# Patient Record
Sex: FEMALE | Race: WHITE | Employment: OTHER | URBAN - METROPOLITAN AREA
[De-identification: names, ages, dates, MRNs, and addresses within clinical notes are randomized per-mention and may not be internally consistent; named-entity substitution may affect disease eponyms.]

---

## 2018-02-19 NOTE — LETTER
NICOLE Notifier: [employer]. Patient Name: Shi Dhillon Identification Number: [id number]                          Advance Beneficiary Notice of Noncoverage (ABN)   NOTE:  If Medicare doesn’t pay for D. item/service(s) below, you may have to pay.  Medicare does not pay for everything, even some care that you or your health care provider have good reason to think you need. We expect Medicare may not pay for the D. item/service(s) below.  D. Items or Services E. Reason Medicare May Not Pay: F. Estimated Cost   Left shoulder injection   Right shoulder injection with ultrasound guidance      __ Medicare does not cover this service      __ Medicare may not pay for this   item/service for your condition     __ Medicare may not pay for this item/service as often as this        WHAT YOU NEED TO DO NOW:  Read this notice, so you can make an informed decision about your care.  Ask us any questions that you may have after you finish reading.  Choose an option below about whether to receive the D. item/service(s) listed above.  Note: If you choose Option 1 or 2, we may help you to use any other insurance that you might have, but Medicare cannot require us to do this.  G. OPTIONS: Check only one box.  We cannot choose a box for you.   OPTION 1. I want the D. item/service(s) listed above. You may ask to be paid now, but I also want Medicare billed for an official decision on payment, which is sent to me on a Medicare Summary Notice (MSN). I understand that if Medicare doesn’t pay, I am responsible for payment, but I can appeal to Medicare by following the directions on the MSN. If Medicare does pay, you will refund any payments I made to you, less co-pays or deductibles.  OPTION 2. I want the D. item/service(s) listed above, but do not bill Medicare. You may ask to be paid now as I am responsible for payment. I cannot appeal if Medicare is not billed.  OPTION 3. I don't want the D. item/service(s) listed  above. I understand with this choice I am not responsible for payment, and I cannot appeal to see if Medicare would pay.    H. Additional Information:    This notice gives our opinion, not an official Medicare decision. If you have other questions on this notice or Medicare billing, call 1-800-MEDICARE ([phone]/TTY: [phone]). Signing below means that you have received and understand this notice. You also receive a copy.  I. Signature: J. Date:       You have the right to get Medicare information in an accessible format, like large print, Braille, or audio. You also have the right to file a complaint if you feel you’ve been discriminated against. Visit Medicare.gov/about- us/sbteuxuvbhqnj-qfkftjxazseyfdevl-lgbcwx.  According to the Paperwork Reduction Act of 1995, no persons are required to respond to a collection of information unless it displays a valid OMB control number. The valid OMB control number for this information collection is 0458-7429. The time required to complete this information collection is estimated to average 7 minutes per response, including the time to review instructions, search existing data resources, gather the data needed, and complete and review the information collection. If you have comments concerning the accuracy of the time estimate or suggestions for improving this form, please write to: CMS, [address], Attn: CHLOE Reports Clearance Officer, [place], [place] [address].  Form CMS-R-131 (Exp. 1/31/2026) Form Approved OMB No. [id number]         none

## 2020-07-18 ENCOUNTER — APPOINTMENT (OUTPATIENT)
Dept: GENERAL RADIOLOGY | Facility: HOSPITAL | Age: 85
End: 2020-07-18
Attending: EMERGENCY MEDICINE
Payer: MEDICARE

## 2020-07-18 ENCOUNTER — HOSPITAL ENCOUNTER (EMERGENCY)
Facility: HOSPITAL | Age: 85
Discharge: HOME | End: 2020-07-18
Attending: EMERGENCY MEDICINE
Payer: MEDICARE

## 2020-07-18 VITALS
DIASTOLIC BLOOD PRESSURE: 71 MMHG | BODY MASS INDEX: 23.74 KG/M2 | RESPIRATION RATE: 17 BRPM | HEIGHT: 62 IN | WEIGHT: 129 LBS | TEMPERATURE: 98 F | SYSTOLIC BLOOD PRESSURE: 176 MMHG | HEART RATE: 74 BPM | OXYGEN SATURATION: 97 %

## 2020-07-18 DIAGNOSIS — M54.59 INTRACTABLE LOW BACK PAIN: Primary | ICD-10-CM

## 2020-07-18 PROCEDURE — 72100 X-RAY EXAM L-S SPINE 2/3 VWS: CPT | Performed by: EMERGENCY MEDICINE

## 2020-07-18 PROCEDURE — 99283 EMERGENCY DEPT VISIT LOW MDM: CPT

## 2020-07-18 RX ORDER — HYDROCODONE BITARTRATE AND ACETAMINOPHEN 5; 325 MG/1; MG/1
1 TABLET ORAL ONCE
Status: COMPLETED | OUTPATIENT
Start: 2020-07-18 | End: 2020-07-18

## 2020-07-18 RX ORDER — HYDROCODONE BITARTRATE AND ACETAMINOPHEN 5; 325 MG/1; MG/1
1 TABLET ORAL EVERY 6 HOURS PRN
Qty: 5 TABLET | Refills: 0 | Status: SHIPPED | OUTPATIENT
Start: 2020-07-18 | End: 2020-07-22

## 2020-07-18 RX ORDER — METHYLPREDNISOLONE 4 MG/1
TABLET ORAL
Qty: 1 PACKAGE | Refills: 0 | Status: SHIPPED | OUTPATIENT
Start: 2020-07-18 | End: 2020-08-19 | Stop reason: ALTCHOICE

## 2020-07-18 NOTE — ED PROVIDER NOTES
Patient Seen in: [facility] AND [facility] Emergency Department      History   Patient presents with:  Back Pain    Stated Complaint: back pain     HPI    Patient is an 25-year-old female that is got a long history of back pain she describes having multiple inje Normal rate, regular rhythm, normal heart sounds and intact distal pulses. Pulmonary/Chest: Effort normal and breath sounds normal. No respiratory distress. Abdominal: Soft. Bowel sounds are normal. Exhibits no distension and no mass.  There is no tend Prescribed:  Current Discharge Medication List    START taking these medications    methylPREDNISolone (MEDROL) 4 MG Oral Tablet Therapy Pack  Dosepack: take as directed  Qty: 1 Package Refills: 0    HYDROcodone-acetaminophen 5-325 MG Oral Tab  Take 1 tabl

## 2020-07-22 ENCOUNTER — OFFICE VISIT (OUTPATIENT)
Dept: NEUROLOGY | Facility: CLINIC | Age: 85
End: 2020-07-22
Payer: MEDICARE

## 2020-07-22 VITALS
HEIGHT: 62 IN | SYSTOLIC BLOOD PRESSURE: 176 MMHG | WEIGHT: 129 LBS | DIASTOLIC BLOOD PRESSURE: 78 MMHG | BODY MASS INDEX: 23.74 KG/M2 | HEART RATE: 88 BPM | RESPIRATION RATE: 20 BRPM

## 2020-07-22 DIAGNOSIS — Z79.4 TYPE 2 DIABETES MELLITUS WITHOUT COMPLICATION, WITH LONG-TERM CURRENT USE OF INSULIN (HCC): ICD-10-CM

## 2020-07-22 DIAGNOSIS — E11.9 TYPE 2 DIABETES MELLITUS WITHOUT COMPLICATION, WITH LONG-TERM CURRENT USE OF INSULIN (HCC): ICD-10-CM

## 2020-07-22 DIAGNOSIS — E11.42 DIABETIC POLYNEUROPATHY ASSOCIATED WITH TYPE 2 DIABETES MELLITUS (HCC): ICD-10-CM

## 2020-07-22 DIAGNOSIS — K29.60 NSAID INDUCED GASTRITIS: ICD-10-CM

## 2020-07-22 DIAGNOSIS — T39.395A NSAID INDUCED GASTRITIS: ICD-10-CM

## 2020-07-22 DIAGNOSIS — Z85.3 HISTORY OF BREAST CANCER: ICD-10-CM

## 2020-07-22 DIAGNOSIS — M47.816 LUMBAR SPONDYLOSIS: Primary | ICD-10-CM

## 2020-07-22 DIAGNOSIS — R26.9 GAIT DISTURBANCE: ICD-10-CM
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
## 2020-07-22 PROBLEM — K92.0 GASTROINTESTINAL HEMORRHAGE WITH HEMATEMESIS: Status: ACTIVE | Noted: 2020-07-22

## 2020-07-22 PROCEDURE — 99205 OFFICE O/P NEW HI 60 MIN: CPT | Performed by: PHYSICAL MEDICINE & REHABILITATION

## 2020-07-22 RX ORDER — FUROSEMIDE 20 MG/1
20 TABLET ORAL DAILY
COMMUNITY

## 2020-07-22 RX ORDER — METOPROLOL TARTRATE 50 MG/1
50 TABLET, FILM COATED ORAL DAILY
COMMUNITY

## 2020-07-22 RX ORDER — GLIMEPIRIDE 4 MG/1
TABLET ORAL DAILY
COMMUNITY
Start: 2020-04-27

## 2020-07-22 RX ORDER — INSULIN DETEMIR 100 [IU]/ML
INJECTION, SOLUTION SUBCUTANEOUS
COMMUNITY
Start: 2020-05-26

## 2020-07-22 RX ORDER — ASPIRIN 81 MG/1
81 TABLET ORAL DAILY
COMMUNITY

## 2020-07-22 RX ORDER — ATORVASTATIN CALCIUM 10 MG/1
10 TABLET, FILM COATED ORAL DAILY
COMMUNITY
Start: 2020-04-27

## 2020-07-22 RX ORDER — GABAPENTIN 600 MG/1
TABLET ORAL 2 TIMES DAILY
COMMUNITY
Start: 2020-02-03

## 2020-07-22 RX ORDER — LISINOPRIL 20 MG/1
30 TABLET ORAL DAILY
COMMUNITY

## 2020-07-22 RX ORDER — AMLODIPINE BESYLATE 10 MG/1
10 TABLET ORAL DAILY
COMMUNITY

## 2020-07-22 RX ORDER — TRAMADOL HYDROCHLORIDE 50 MG/1
50 TABLET ORAL
COMMUNITY
End: 2020-07-22

## 2020-07-22 RX ORDER — FERROUS SULFATE 325(65) MG
325 TABLET ORAL
COMMUNITY
Start: 2019-10-10 | End: 2020-07-22

## 2020-07-22 RX ORDER — PANTOPRAZOLE SODIUM 40 MG/1
40 TABLET, DELAYED RELEASE ORAL
COMMUNITY
Start: 2019-09-02 | End: 2020-07-22

## 2020-07-22 RX ORDER — DIGOXIN 125 MCG
0.12 TABLET ORAL DAILY
COMMUNITY

## 2020-07-22 NOTE — PROGRESS NOTES
[address]  Progress Note    CHIEF COMPLAINT:  Patient presents with:  Low Back Pain: Patient present with low back  pain for 5+ years.  State aching pain that increase to stabbing when stands up, radiating d Take 30 mg by mouth daily. • LEVEMIR FLEXTOUCH 100 UNIT/ML Subcutaneous Solution Pen-injector INJECT 25 UNITS SC QD.     • glimepiride 4 MG Oral Tab Take by mouth daily. • gabapentin 600 MG Oral Tab Take by mouth 2 (two) times daily.      • furose lumbar plain film x-ray from the ED showing claw osteophytes and DDD throughout. Also multilevel degenerative spondylolisthesis throughout. ASSESSMENT AND PLAN:  1. Lumbar spondylosis  She is a complicated lady. Her main complaint is low back pain.

## 2020-07-27 ENCOUNTER — TELEPHONE (OUTPATIENT)
Dept: NEUROLOGY | Facility: CLINIC | Age: 85
End: 2020-07-27

## 2020-07-27 NOTE — TELEPHONE ENCOUNTER
On 7/22/20 Dr. Jannie Carvajal placed orders for  Bilateral L3-4, L4-5 and L5-S1 facet injections  Our office have not received insurance approval yet-routing to referral coordinators for determination

## 2020-07-28 NOTE — TELEPHONE ENCOUNTER
Referral from 07/22/20 was auto approved  Medicare Online for insurance coverage of Bilateral L3-4, L4-5 and L5-S1 facet injections cpt codes 25085-82, 05939-IS, 12395-PD, 84458-GF, 81073-NF. Insurance was verified and procedure is a covered benefit.  Au

## 2020-07-29 NOTE — TELEPHONE ENCOUNTER
Looks like injections were approved per last note, please call daughter Jesi Mcgill to schedule it.   Thanks

## 2020-07-29 NOTE — TELEPHONE ENCOUNTER
Spoke to patient's daughter and scheduled her for Bilateral L34, L45 and L5-S1 facet injections on 7/30/20. Medications and allergies reviewed. She will have a ride for procedure.

## 2020-07-30 ENCOUNTER — OFFICE VISIT (OUTPATIENT)
Dept: SURGERY | Facility: CLINIC | Age: 85
End: 2020-07-30

## 2020-07-30 DIAGNOSIS — M47.816 LUMBAR SPONDYLOSIS: Primary | ICD-10-CM

## 2020-07-30 PROCEDURE — 64494 INJ PARAVERT F JNT L/S 2 LEV: CPT | Performed by: PHYSICAL MEDICINE & REHABILITATION

## 2020-07-30 PROCEDURE — 64493 INJ PARAVERT F JNT L/S 1 LEV: CPT | Performed by: PHYSICAL MEDICINE & REHABILITATION

## 2020-07-30 PROCEDURE — 64495 INJ PARAVERT F JNT L/S 3 LEV: CPT | Performed by: PHYSICAL MEDICINE & REHABILITATION

## 2020-08-19 ENCOUNTER — OFFICE VISIT (OUTPATIENT)
Dept: NEUROLOGY | Facility: CLINIC | Age: 85
End: 2020-08-19
Payer: MEDICARE

## 2020-08-19 VITALS — WEIGHT: 121 LBS | BODY MASS INDEX: 22.26 KG/M2 | HEIGHT: 62 IN

## 2020-08-19 DIAGNOSIS — T39.395A NSAID INDUCED GASTRITIS: ICD-10-CM

## 2020-08-19 DIAGNOSIS — R26.9 GAIT DISTURBANCE: ICD-10-CM

## 2020-08-19 DIAGNOSIS — E11.9 TYPE 2 DIABETES MELLITUS WITHOUT COMPLICATION, WITH LONG-TERM CURRENT USE OF INSULIN (HCC): ICD-10-CM

## 2020-08-19 DIAGNOSIS — E11.42 DIABETIC POLYNEUROPATHY ASSOCIATED WITH TYPE 2 DIABETES MELLITUS (HCC): ICD-10-CM

## 2020-08-19 DIAGNOSIS — M48.062 SPINAL STENOSIS OF LUMBAR REGION WITH NEUROGENIC CLAUDICATION: Primary | ICD-10-CM

## 2020-08-19 DIAGNOSIS — K29.60 NSAID INDUCED GASTRITIS: ICD-10-CM

## 2020-08-19 DIAGNOSIS — Z79.4 TYPE 2 DIABETES MELLITUS WITHOUT COMPLICATION, WITH LONG-TERM CURRENT USE OF INSULIN (HCC): ICD-10-CM

## 2020-08-19 PROBLEM — G60.3 IDIOPATHIC PROGRESSIVE NEUROPATHY: Status: ACTIVE | Noted: 2020-08-19

## 2020-08-19 PROCEDURE — 99214 OFFICE O/P EST MOD 30 MIN: CPT | Performed by: PHYSICAL MEDICINE & REHABILITATION

## 2020-08-19 RX ORDER — PREGABALIN 75 MG/1
75 CAPSULE ORAL 2 TIMES DAILY
COMMUNITY
End: 2020-08-19 | Stop reason: ALTCHOICE

## 2020-08-19 RX ORDER — DULOXETIN HYDROCHLORIDE 30 MG/1
30 CAPSULE, DELAYED RELEASE ORAL DAILY
Qty: 30 CAPSULE | Refills: 0 | Status: SHIPPED | OUTPATIENT
Start: 2020-08-19

## 2020-08-19 NOTE — PROGRESS NOTES
[address]  Progress Note    CHIEF COMPLAINT:  Patient presents with:  Low Back Pain: Patient presents for post injectin follow up.  She reports that injection did not help c/o pain in lower back, groin legs (CYMBALTA) 30 MG Oral Cap DR Particles Take 1 capsule (30 mg total) by mouth daily. 30 capsule 0   • Metoprolol Tartrate 50 MG Oral Tab Take 50 mg by mouth daily. • lisinopril 20 MG Oral Tab Take 30 mg by mouth daily.        • LEVEMIR FLEXTOUCH 100 UNIT extremities are diffusely weak, very poor balance and wide-based gait  Sensation: Decreased in a stocking distribution  Reflexes: Areflexic  SLR: neg        Data    Radiology Imagin.  I personally reviewed an old lumbar MRI showing multilevel stenosis plan.  All questions were answered. There were no barriers to learning.         June Cabrera MD  Physical Medicine and Rehabilitation/Sports Medicine  MEDICAL CENTER [facility]

## 2020-08-24 ENCOUNTER — TELEPHONE (OUTPATIENT)
Dept: NEUROLOGY | Facility: CLINIC | Age: 85
End: 2020-08-24

## 2020-08-24 NOTE — TELEPHONE ENCOUNTER
Contacted patient who requested order for Wheelchair be faxed to [facility] in [place].     Order faxed to [phone]    Patient thankful without further questions

## 2020-09-23 ENCOUNTER — OFFICE VISIT (OUTPATIENT)
Dept: NEUROLOGY | Facility: CLINIC | Age: 85
End: 2020-09-23
Payer: MEDICARE

## 2020-09-23 VITALS — WEIGHT: 121 LBS | BODY MASS INDEX: 22.26 KG/M2 | HEIGHT: 62 IN

## 2020-09-23 DIAGNOSIS — M48.062 SPINAL STENOSIS OF LUMBAR REGION WITH NEUROGENIC CLAUDICATION: Primary | ICD-10-CM

## 2020-09-23 DIAGNOSIS — T39.395A NSAID INDUCED GASTRITIS: ICD-10-CM

## 2020-09-23 DIAGNOSIS — K29.60 NSAID INDUCED GASTRITIS: ICD-10-CM

## 2020-09-23 DIAGNOSIS — E11.42 DIABETIC POLYNEUROPATHY ASSOCIATED WITH TYPE 2 DIABETES MELLITUS (HCC): ICD-10-CM

## 2020-09-23 DIAGNOSIS — R26.9 GAIT DISTURBANCE: ICD-10-CM
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
## 2020-09-23 PROCEDURE — 99213 OFFICE O/P EST LOW 20 MIN: CPT | Performed by: PHYSICAL MEDICINE & REHABILITATION

## 2020-09-23 RX ORDER — DICYCLOMINE HYDROCHLORIDE 10 MG/1
10 CAPSULE ORAL
COMMUNITY

## 2020-09-23 RX ORDER — PANTOPRAZOLE SODIUM 40 MG/1
40 TABLET, DELAYED RELEASE ORAL
COMMUNITY

## 2020-09-23 RX ORDER — POLYETHYLENE GLYCOL 3350 17 G/17G
17 POWDER, FOR SOLUTION ORAL DAILY
COMMUNITY

## 2020-09-23 RX ORDER — TRAMADOL HYDROCHLORIDE 50 MG/1
50 TABLET ORAL EVERY 6 HOURS PRN
COMMUNITY

## 2020-09-23 RX ORDER — SENNA PLUS 8.6 MG/1
1 TABLET ORAL DAILY
COMMUNITY

## 2020-09-23 RX ORDER — ONDANSETRON 4 MG/1
4 TABLET, FILM COATED ORAL EVERY 8 HOURS PRN
COMMUNITY

## 2020-09-23 RX ORDER — CARVEDILOL 12.5 MG/1
12.5 TABLET ORAL 2 TIMES DAILY WITH MEALS
COMMUNITY

## 2020-09-23 NOTE — PROGRESS NOTES
[address]  Progress Note    CHIEF COMPLAINT:  Patient presents with:  Low Back Pain: Patient presents for post MRI follow up.  Patient states that on 09/01/2020 she slipped and fell in bath tub, went to [facility] Oral Tab Take 12.5 mg by mouth 2 (two) times daily with meals. • Dicyclomine HCl 10 MG Oral Cap Take 10 mg by mouth 4 (four) times daily before meals and nightly.      • Ondansetron HCl (ZOFRAN) 4 mg tablet Take 4 mg by mouth every 8 (eight) hours as ne All other systems reviewed and are negative. Pertinent positives and negatives noted in the HPI. PHYSICAL EXAM:   Ht 62\"   Wt 121 lb (54.9 kg)   BMI 22.13 kg/m²     Body mass index is 22.13 kg/m².       General: No immediate distress  Eye wellbeing. 3. Fall, initial encounter  Work-up proceeding as above    4. Diabetic polyneuropathy associated with type 2 diabetes mellitus (HCC)  Severe, she does use a walker.     5. NSAID induced gastritis  NSAIDs relatively contraindicated due to histo

## 2020-09-28 ENCOUNTER — TELEPHONE (OUTPATIENT)
Dept: NEUROLOGY | Facility: CLINIC | Age: 85
End: 2020-09-28

## 2021-02-11 DIAGNOSIS — Z23 NEED FOR VACCINATION: ICD-10-CM

## 2022-11-02 ENCOUNTER — TELEPHONE (OUTPATIENT)
Dept: NEUROLOGY | Facility: CLINIC | Age: 87
End: 2022-11-02

## 2022-11-02 ENCOUNTER — OFFICE VISIT (OUTPATIENT)
Dept: PHYSICAL MEDICINE AND REHAB | Facility: CLINIC | Age: 87
End: 2022-11-02
Payer: MEDICARE

## 2022-11-02 ENCOUNTER — HOSPITAL ENCOUNTER (OUTPATIENT)
Dept: GENERAL RADIOLOGY | Facility: HOSPITAL | Age: 87
Discharge: HOME | End: 2022-11-02
Attending: PHYSICAL MEDICINE & REHABILITATION
Payer: MEDICARE

## 2022-11-02 VITALS — WEIGHT: 122 LBS | HEART RATE: 77 BPM | OXYGEN SATURATION: 99 % | HEIGHT: 60 IN | BODY MASS INDEX: 23.95 KG/M2

## 2022-11-02 DIAGNOSIS — M25.511 CHRONIC PAIN OF BOTH SHOULDERS: ICD-10-CM

## 2022-11-02 DIAGNOSIS — K92.0 GASTROINTESTINAL HEMORRHAGE WITH HEMATEMESIS: ICD-10-CM

## 2022-11-02 DIAGNOSIS — M25.411 EFFUSION OF RIGHT SHOULDER JOINT: ICD-10-CM

## 2022-11-02 DIAGNOSIS — M25.512 CHRONIC PAIN OF BOTH SHOULDERS: ICD-10-CM

## 2022-11-02 DIAGNOSIS — E11.9 TYPE 2 DIABETES MELLITUS WITHOUT COMPLICATION, WITH LONG-TERM CURRENT USE OF INSULIN (HCC): ICD-10-CM

## 2022-11-02 DIAGNOSIS — G89.29 CHRONIC PAIN OF BOTH SHOULDERS: ICD-10-CM

## 2022-11-02 DIAGNOSIS — Z79.4 TYPE 2 DIABETES MELLITUS WITHOUT COMPLICATION, WITH LONG-TERM CURRENT USE OF INSULIN (HCC): ICD-10-CM

## 2022-11-02 DIAGNOSIS — M25.411 EFFUSION OF RIGHT SHOULDER JOINT: Primary | ICD-10-CM

## 2022-11-02 PROCEDURE — 73030 X-RAY EXAM OF SHOULDER: CPT | Performed by: PHYSICAL MEDICINE & REHABILITATION

## 2022-11-02 NOTE — TELEPHONE ENCOUNTER
Right shoulder injection/ aspiration CPT CODE: 87473,      Diana Huerta for authorization for above. Spoke to Erik MANE who states no authorization is required. Reference call# [id number].     Notified RICARDO Approved Referral for scheduling

## 2022-11-03 ENCOUNTER — MED REC SCAN ONLY (OUTPATIENT)
Dept: PHYSICAL MEDICINE AND REHAB | Facility: CLINIC | Age: 87
End: 2022-11-03

## 2022-11-03 RX ORDER — TRIAMCINOLONE ACETONIDE 40 MG/ML
80 INJECTION, SUSPENSION INTRA-ARTICULAR; INTRAMUSCULAR ONCE
Status: COMPLETED | OUTPATIENT
Start: 2022-11-03 | End: 2022-11-03

## 2022-11-03 RX ORDER — LIDOCAINE HYDROCHLORIDE 10 MG/ML
4 INJECTION, SOLUTION INFILTRATION; PERINEURAL ONCE
Status: COMPLETED | OUTPATIENT
Start: 2022-11-03 | End: 2022-11-03

## 2022-11-16 ENCOUNTER — OFFICE VISIT (OUTPATIENT)
Dept: PHYSICAL MEDICINE AND REHAB | Facility: CLINIC | Age: 87
End: 2022-11-16
Payer: MEDICARE

## 2022-11-16 VITALS — HEART RATE: 80 BPM | BODY MASS INDEX: 23.95 KG/M2 | OXYGEN SATURATION: 100 % | HEIGHT: 60 IN | WEIGHT: 122 LBS

## 2022-11-16 DIAGNOSIS — M25.512 CHRONIC PAIN OF BOTH SHOULDERS: Primary | ICD-10-CM

## 2022-11-16 DIAGNOSIS — M25.511 CHRONIC PAIN OF BOTH SHOULDERS: Primary | ICD-10-CM

## 2022-11-16 DIAGNOSIS — G89.29 CHRONIC PAIN OF BOTH SHOULDERS: Primary | ICD-10-CM

## 2022-11-16 DIAGNOSIS — M25.411 EFFUSION OF RIGHT SHOULDER JOINT: ICD-10-CM

## 2022-11-16 RX ORDER — DULOXETIN HYDROCHLORIDE 20 MG/1
20 CAPSULE, DELAYED RELEASE ORAL DAILY
Qty: 30 CAPSULE | Refills: 0 | Status: SHIPPED | OUTPATIENT
Start: 2022-11-16 | End: 2022-12-16

## 2022-11-27 PROBLEM — M25.511 CHRONIC PAIN OF BOTH SHOULDERS: Status: ACTIVE | Noted: 2022-11-27

## 2022-11-27 PROBLEM — M25.512 CHRONIC PAIN OF BOTH SHOULDERS: Status: ACTIVE | Noted: 2022-11-27

## 2022-11-27 PROBLEM — M25.411: Status: ACTIVE | Noted: 2022-11-27

## 2022-11-27 PROBLEM — G89.29 CHRONIC PAIN OF BOTH SHOULDERS: Status: ACTIVE | Noted: 2022-11-27

## 2022-11-28 NOTE — PROCEDURES
Shoulder injection  Location: right  After discussing benefits and possible side effects, we proceeded with a subacromial bursa injection. The patient was consented. The patient was seated, and the posterolateral shoulder was palpated. The skin was sterilely prepped. An 18-gauge needle was introduced into the subacromial space. 5 cc of dark red effusion was withdrawn. A total of 2 cc of 40 mg/ml Kenalog and 4 cc of 1% lidocaine was injected. The patient tolerated the procedure well without adverse effects.

## 2022-11-29 ENCOUNTER — MED REC SCAN ONLY (OUTPATIENT)
Dept: PHYSICAL MEDICINE AND REHAB | Facility: CLINIC | Age: 87
End: 2022-11-29

## 2022-12-21 ENCOUNTER — OFFICE VISIT (OUTPATIENT)
Dept: PHYSICAL MEDICINE AND REHAB | Facility: CLINIC | Age: 87
End: 2022-12-21
Payer: MEDICARE

## 2022-12-21 VITALS — HEIGHT: 60 IN | BODY MASS INDEX: 24.15 KG/M2 | OXYGEN SATURATION: 100 % | HEART RATE: 85 BPM | WEIGHT: 123 LBS

## 2022-12-21 DIAGNOSIS — M25.511 CHRONIC PAIN OF BOTH SHOULDERS: Primary | ICD-10-CM

## 2022-12-21 DIAGNOSIS — E11.9 TYPE 2 DIABETES MELLITUS WITHOUT COMPLICATION, WITH LONG-TERM CURRENT USE OF INSULIN (HCC): ICD-10-CM

## 2022-12-21 DIAGNOSIS — G89.29 CHRONIC PAIN OF BOTH SHOULDERS: Primary | ICD-10-CM

## 2022-12-21 DIAGNOSIS — Z79.4 TYPE 2 DIABETES MELLITUS WITHOUT COMPLICATION, WITH LONG-TERM CURRENT USE OF INSULIN (HCC): ICD-10-CM

## 2022-12-21 DIAGNOSIS — K92.0 GASTROINTESTINAL HEMORRHAGE WITH HEMATEMESIS: ICD-10-CM

## 2022-12-21 DIAGNOSIS — M25.512 CHRONIC PAIN OF BOTH SHOULDERS: Primary | ICD-10-CM

## 2022-12-21 PROCEDURE — 3008F BODY MASS INDEX DOCD: CPT | Performed by: PHYSICAL MEDICINE & REHABILITATION

## 2022-12-21 PROCEDURE — 99213 OFFICE O/P EST LOW 20 MIN: CPT | Performed by: PHYSICAL MEDICINE & REHABILITATION

## 2022-12-21 PROCEDURE — 1125F AMNT PAIN NOTED PAIN PRSNT: CPT | Performed by: PHYSICAL MEDICINE & REHABILITATION

## 2023-01-16 RX ORDER — DULOXETIN HYDROCHLORIDE 20 MG/1
CAPSULE, DELAYED RELEASE ORAL
Qty: 30 CAPSULE | Refills: 0 | Status: SHIPPED | OUTPATIENT
Start: 2023-01-16

## 2023-01-16 NOTE — TELEPHONE ENCOUNTER
Medication request: Duloxetine 20 mg oral Cap. Take 1 capsule by mouth daily. #30. No refills. LOV: 12/21/2022  NOV: None. Last office note: I educated her that the Cymbalta must be taken regularly for us to determine whether it is working or not. She will return if symptoms worsen. [place] 11/16/2022 per pharmacy.

## 2023-02-13 NOTE — TELEPHONE ENCOUNTER
Refill Request    Medication request: DULOXETINE 20 MG Oral Cap DR Particles. TAKE 1 CAPSULE BY MOUTH EVERY DAY    LOV:12/21/2022 Abiel Harman MD   Due back to clinic per last office note: Per Dr. Oseas Ramos: Alberto Sterling will return if symptoms worsen. \"  NOV: 2/14/2023 Abiel Harman MD      [place]: 01/21/2023 #30  - s/w pharmacy staff to verbally confirm last refill date. Urine drug screen (if applicable): n/a  Pain contract: n/a    LOV plan (if weaning or changing medications): Per Dr. Oseas Ramos: \"We discussed options and opted for watchful waiting. I educated her that the Cymbalta must be taken regularly for us to determine whether it is working or not. \"

## 2023-02-14 ENCOUNTER — OFFICE VISIT (OUTPATIENT)
Dept: PHYSICAL MEDICINE AND REHAB | Facility: CLINIC | Age: 88
End: 2023-02-14
Payer: MEDICARE

## 2023-02-14 VITALS — BODY MASS INDEX: 24.15 KG/M2 | WEIGHT: 123 LBS | HEIGHT: 60 IN

## 2023-02-14 DIAGNOSIS — Z79.4 TYPE 2 DIABETES MELLITUS WITHOUT COMPLICATION, WITH LONG-TERM CURRENT USE OF INSULIN (HCC): ICD-10-CM

## 2023-02-14 DIAGNOSIS — K92.0 GASTROINTESTINAL HEMORRHAGE WITH HEMATEMESIS: ICD-10-CM

## 2023-02-14 DIAGNOSIS — M25.512 CHRONIC PAIN OF BOTH SHOULDERS: Primary | ICD-10-CM

## 2023-02-14 DIAGNOSIS — E11.9 TYPE 2 DIABETES MELLITUS WITHOUT COMPLICATION, WITH LONG-TERM CURRENT USE OF INSULIN (HCC): ICD-10-CM

## 2023-02-14 DIAGNOSIS — M25.511 CHRONIC PAIN OF BOTH SHOULDERS: Primary | ICD-10-CM

## 2023-02-14 DIAGNOSIS — G89.29 CHRONIC PAIN OF BOTH SHOULDERS: Primary | ICD-10-CM

## 2023-02-14 PROCEDURE — 1125F AMNT PAIN NOTED PAIN PRSNT: CPT | Performed by: PHYSICAL MEDICINE & REHABILITATION

## 2023-02-14 PROCEDURE — 3008F BODY MASS INDEX DOCD: CPT | Performed by: PHYSICAL MEDICINE & REHABILITATION

## 2023-02-14 PROCEDURE — 99214 OFFICE O/P EST MOD 30 MIN: CPT | Performed by: PHYSICAL MEDICINE & REHABILITATION

## 2023-02-14 RX ORDER — DULOXETIN HYDROCHLORIDE 20 MG/1
40 CAPSULE, DELAYED RELEASE ORAL DAILY
Qty: 60 CAPSULE | Refills: 0 | Status: SHIPPED | OUTPATIENT
Start: 2023-02-14 | End: 2023-03-16

## 2023-03-13 NOTE — TELEPHONE ENCOUNTER
Refill Request    Medication request: DULoxetine 20 MG Oral Cap DR Particles Take 2 capsules (40 mg total) by mouth daily    LOV:2/14/2023 Martin Bullard MD   Due back to clinic per last office note:  \"She will check back with me in 1 month\"  NOV: 3/22/2023 Martin Bullard MD      [place]: 2/14/2023 #60    Urine drug screen (if applicable): None  Pain contract: None    LOV plan (if weaning or changing medications): Per Dr. Erika Chawla note: \" She seems to be responding to Cymbalta 20 mg without side effects. I recommended increasing to 40 mg a day. \"    SW patient's daughter, who reported patient is doing well on the increased dose of duloxetine. Patient is taking 2 capsules daily.

## 2023-03-14 RX ORDER — DULOXETIN HYDROCHLORIDE 20 MG/1
CAPSULE, DELAYED RELEASE ORAL
Qty: 60 CAPSULE | Refills: 0 | Status: SHIPPED | OUTPATIENT
Start: 2023-03-14

## 2023-03-22 ENCOUNTER — TELEPHONE (OUTPATIENT)
Dept: PHYSICAL MEDICINE AND REHAB | Facility: CLINIC | Age: 88
End: 2023-03-22

## 2023-03-22 ENCOUNTER — OFFICE VISIT (OUTPATIENT)
Dept: PHYSICAL MEDICINE AND REHAB | Facility: CLINIC | Age: 88
End: 2023-03-22
Payer: MEDICARE

## 2023-03-22 VITALS — WEIGHT: 125 LBS | HEIGHT: 60 IN | OXYGEN SATURATION: 97 % | BODY MASS INDEX: 24.54 KG/M2 | HEART RATE: 85 BPM

## 2023-03-22 DIAGNOSIS — E11.42 DIABETIC POLYNEUROPATHY ASSOCIATED WITH TYPE 2 DIABETES MELLITUS (HCC): ICD-10-CM

## 2023-03-22 DIAGNOSIS — R26.9 GAIT DISTURBANCE: ICD-10-CM

## 2023-03-22 DIAGNOSIS — M19.012 PRIMARY OSTEOARTHRITIS OF SHOULDERS, BILATERAL: ICD-10-CM

## 2023-03-22 DIAGNOSIS — M48.062 SPINAL STENOSIS OF LUMBAR REGION WITH NEUROGENIC CLAUDICATION: Primary | ICD-10-CM

## 2023-03-22 DIAGNOSIS — M19.011 PRIMARY OSTEOARTHRITIS OF SHOULDERS, BILATERAL: ICD-10-CM

## 2023-03-22 PROCEDURE — 3008F BODY MASS INDEX DOCD: CPT | Performed by: PHYSICAL MEDICINE & REHABILITATION

## 2023-03-22 PROCEDURE — 1125F AMNT PAIN NOTED PAIN PRSNT: CPT | Performed by: PHYSICAL MEDICINE & REHABILITATION

## 2023-03-22 PROCEDURE — 99214 OFFICE O/P EST MOD 30 MIN: CPT | Performed by: PHYSICAL MEDICINE & REHABILITATION

## 2023-03-22 RX ORDER — DULOXETIN HYDROCHLORIDE 60 MG/1
60 CAPSULE, DELAYED RELEASE ORAL DAILY
Qty: 30 CAPSULE | Refills: 0 | Status: SHIPPED | OUTPATIENT
Start: 2023-03-22 | End: 2023-04-21

## 2023-03-22 NOTE — TELEPHONE ENCOUNTER
Initiated authorization for Bilateral L3 TFESIs CPT [id number] dx:M48.062 to be done at [address] with [employer]  Status: Approved valid 3/22/23-6/20/23  Authorization #[id number] [facility] Tracking #[id number]

## 2023-03-24 PROBLEM — M19.012 PRIMARY OSTEOARTHRITIS OF SHOULDERS, BILATERAL: Status: ACTIVE | Noted: 2023-03-24

## 2023-03-24 PROBLEM — E11.9 TYPE 2 DIABETES MELLITUS WITHOUT COMPLICATION, WITH LONG-TERM CURRENT USE OF INSULIN (HCC): Status: ACTIVE | Noted: 2020-07-22

## 2023-03-24 PROBLEM — Z79.4 TYPE 2 DIABETES MELLITUS WITHOUT COMPLICATION, WITH LONG-TERM CURRENT USE OF INSULIN (HCC): Status: ACTIVE | Noted: 2020-07-22

## 2023-03-24 PROBLEM — M19.011 PRIMARY OSTEOARTHRITIS OF SHOULDERS, BILATERAL: Status: ACTIVE | Noted: 2023-03-24

## 2023-04-06 ENCOUNTER — OFFICE VISIT (OUTPATIENT)
Dept: SURGERY | Facility: CLINIC | Age: 88
End: 2023-04-06
Payer: MEDICARE

## 2023-04-06 DIAGNOSIS — M48.062 SPINAL STENOSIS OF LUMBAR REGION WITH NEUROGENIC CLAUDICATION: Primary | ICD-10-CM

## 2023-04-06 PROCEDURE — 64483 NJX AA&/STRD TFRM EPI L/S 1: CPT | Performed by: PHYSICAL MEDICINE & REHABILITATION

## 2023-04-06 NOTE — PROCEDURES
Preoperative Diagnosis:  (Z54.580) Spinal stenosis of lumbar region with neurogenic claudication  (primary encounter diagnosis)       Postoperative Diagnosis:  (F89.566) Spinal stenosis of lumbar region with neurogenic claudication  (primary encounter diagnosis)       Procedures: Bilateral L3 Transforaminal epidural steroid injection under fluoroscopic guidance and contrast enhancement. Surgeon:  Kimberli Jackson M.D. Anesthesia:  Local      OPERATIVE PROCEDURE:  The patient was consented. She was brought into the operating suite and placed on the fluoroscopy table in the prone position. She was sterilely prepped and draped in routine fashion. The right L3 foramen was identified. The overlying skin was anesthetized. A 22-gauge spinal needle was introduced and directed towards the foramen. Needle position was verified using fluoroscopy and radiographic contrast showing an epidurogram.  There was no withdrawal of blood or CSF from the needle. Radiographic interpretation was that the needle was in proper position for a transforaminal epidural steroid injection. I placed as mixture of 2mL of 6mg/mL Celestone and 2mL of 1% preservative-free lidocaine into the epidural space. The same procedure was repeated on the contralateral side. The patient tolerated the procedure well without any immediate complications. She was given discharge instructions and is to follow up with me in approximately two weeks.

## 2023-04-14 RX ORDER — DULOXETIN HYDROCHLORIDE 20 MG/1
CAPSULE, DELAYED RELEASE ORAL
Qty: 60 CAPSULE | Refills: 0 | OUTPATIENT
Start: 2023-04-14

## 2023-04-17 NOTE — TELEPHONE ENCOUNTER
Refill Request    Medication request: Duloxetine 60mg oral cap    LOV: 4/6/23 EOSC Kristen Harrell MD   RTC: F/u post injection  NOV: 4/25/2023 Kristen Harrell MD      [place]: 3/22/23 #48    UDS: (if applicable): None  Pain contract: N/A    LOV plan (if weaning or changing medications): We will set aside management for the time. I increased her to maximal dose Cymbalta 60 mg.

## 2023-04-18 RX ORDER — DULOXETIN HYDROCHLORIDE 60 MG/1
CAPSULE, DELAYED RELEASE ORAL
Qty: 30 CAPSULE | Refills: 0 | Status: SHIPPED | OUTPATIENT
Start: 2023-04-18

## 2023-04-25 ENCOUNTER — OFFICE VISIT (OUTPATIENT)
Dept: PHYSICAL MEDICINE AND REHAB | Facility: CLINIC | Age: 88
End: 2023-04-25
Payer: MEDICARE

## 2023-04-25 ENCOUNTER — TELEPHONE (OUTPATIENT)
Dept: PHYSICAL MEDICINE AND REHAB | Facility: CLINIC | Age: 88
End: 2023-04-25

## 2023-04-25 VITALS — DIASTOLIC BLOOD PRESSURE: 68 MMHG | SYSTOLIC BLOOD PRESSURE: 124 MMHG

## 2023-04-25 DIAGNOSIS — K92.0 GASTROINTESTINAL HEMORRHAGE WITH HEMATEMESIS: ICD-10-CM

## 2023-04-25 DIAGNOSIS — M25.411 EFFUSION OF JOINT OF RIGHT SHOULDER: Primary | ICD-10-CM

## 2023-04-25 DIAGNOSIS — G89.4 CHRONIC PAIN SYNDROME: ICD-10-CM

## 2023-04-25 DIAGNOSIS — M48.062 SPINAL STENOSIS OF LUMBAR REGION WITH NEUROGENIC CLAUDICATION: ICD-10-CM

## 2023-04-25 DIAGNOSIS — E11.42 DIABETIC POLYNEUROPATHY ASSOCIATED WITH TYPE 2 DIABETES MELLITUS (HCC): ICD-10-CM

## 2023-04-25 PROCEDURE — 3074F SYST BP LT 130 MM HG: CPT | Performed by: PHYSICAL MEDICINE & REHABILITATION

## 2023-04-25 PROCEDURE — 99214 OFFICE O/P EST MOD 30 MIN: CPT | Performed by: PHYSICAL MEDICINE & REHABILITATION

## 2023-04-25 PROCEDURE — 1126F AMNT PAIN NOTED NONE PRSNT: CPT | Performed by: PHYSICAL MEDICINE & REHABILITATION

## 2023-04-25 PROCEDURE — 3078F DIAST BP <80 MM HG: CPT | Performed by: PHYSICAL MEDICINE & REHABILITATION

## 2023-04-25 RX ORDER — BUPRENORPHINE 5 UG/H
1 PATCH TRANSDERMAL WEEKLY
Qty: 4 PATCH | Refills: 0 | OUTPATIENT
Start: 2023-04-25 | End: 2023-05-02

## 2023-04-25 NOTE — TELEPHONE ENCOUNTER
Initiated authorization for Right shoulder injection and aspiration with ultrasound guidance CPT 63484,  with [employer]  Status: Approved-authorization is not required per health plan        Patient was scheduled 5/17/23 however has another appt, so requested to cx.-appt canceled.   Re-scheduled to  on 5/24/23 at patient request since son is off and will provide transportation

## 2023-05-16 RX ORDER — DULOXETIN HYDROCHLORIDE 60 MG/1
60 CAPSULE, DELAYED RELEASE ORAL DAILY
Qty: 90 CAPSULE | Refills: 3 | Status: SHIPPED | OUTPATIENT
Start: 2023-05-16 | End: 2024-05-10

## 2023-05-16 NOTE — TELEPHONE ENCOUNTER
Refill Request    Medication request: DULOXETINE 60 MG Oral Cap DR Particles  TAKE 1 CAPSULE BY MOUTH EVERY DAY    LOV:2023 Audrey Bauer MD   Due back to clinic per last office note:  \"RTC 3 weeks\"  NOV: 2023 Audrey Bauer MD      [place]: 2023 #30    Urine drug screen (if applicable): None  Pain contract: None    LOV plan (if weaning or changing medications): Per Dr. Chacon  notes: \" recommend Butrans. I started her on 5 mcg/h patches she will take that for 2 weeks and then use 2 patches for the third week. \"  (No mention of Duloxetine in LOV plan)  \"She is on cymbalta\"

## 2023-05-30 ENCOUNTER — OFFICE VISIT (OUTPATIENT)
Dept: PHYSICAL MEDICINE AND REHAB | Facility: CLINIC | Age: 88
End: 2023-05-30
Payer: MEDICARE

## 2023-05-30 DIAGNOSIS — M25.411 EFFUSION OF JOINT OF RIGHT SHOULDER: Primary | ICD-10-CM

## 2023-05-30 LAB
BASOPHILS NFR SNV: 0 %
COLOR FLD: YELLOW
EOSINOPHIL NFR SNV: 0 %
LYMPHOCYTES NFR SNV: 20 %
MONOS+MACROS NFR SNV: 34 %
NEUTROPHILS NFR FLD: 46 %
RBC # FLD AUTO: 1333 /CUMM (ref ?–1)
TOTAL CELLS COUNTED FLD: 100
TOTAL CELLS COUNTED SNV: 262 /CUMM (ref 0–200)
WBC # SNV: 262 /CUMM

## 2023-05-30 PROCEDURE — 89051 BODY FLUID CELL COUNT: CPT | Performed by: PHYSICAL MEDICINE & REHABILITATION

## 2023-05-30 PROCEDURE — 89050 BODY FLUID CELL COUNT: CPT | Performed by: PHYSICAL MEDICINE & REHABILITATION

## 2023-05-30 PROCEDURE — 87070 CULTURE OTHR SPECIMN AEROBIC: CPT | Performed by: PHYSICAL MEDICINE & REHABILITATION

## 2023-05-30 PROCEDURE — 87205 SMEAR GRAM STAIN: CPT | Performed by: PHYSICAL MEDICINE & REHABILITATION

## 2023-05-30 PROCEDURE — 89060 EXAM SYNOVIAL FLUID CRYSTALS: CPT | Performed by: PHYSICAL MEDICINE & REHABILITATION

## 2023-05-30 NOTE — PROCEDURES
Shoulder injection with ultrasound guidance  Location: right  After discussing benefits and possible side effects, we proceeded with a an ultrasound-guided intra-articular shoulder injection. The patient was consented. The patient was seated, and the posterolateral shoulder was palpated. A high-frequency linear array transducer was used for ultrasound visualization of the shoulder. There was a minor effusion posteriorly but a significant anterior effusion. There is also significant irregularity of the humeral head. The skin was sterilely prepped. An 18-gauge 1-1/2 inch needle was introduced into the anterior portion of the shoulder. 35 mL of opaque stephenie-yellow joint fluid were removed under direct visualization with complete reduction of the fluid sac anteriorly. Fluids were sent for analysis. A 22 -gauge 3-1/2 inch spinal needle was introduced under ultrasound guidance to the posterior glenohumeral joint just lateral to the labrum. A total of 2 cc of 40 mg/ml Kenalog and 4 cc of 1% lidocaine was injected under direct ultrasound visualization. Permanent images were saved. The patient tolerated the procedure well without adverse effects.

## 2023-06-02 ENCOUNTER — MED REC SCAN ONLY (OUTPATIENT)
Dept: PHYSICAL MEDICINE AND REHAB | Facility: CLINIC | Age: 88
End: 2023-06-02

## 2023-06-02 RX ORDER — TRIAMCINOLONE ACETONIDE 40 MG/ML
80 INJECTION, SUSPENSION INTRA-ARTICULAR; INTRAMUSCULAR ONCE
Status: COMPLETED | OUTPATIENT
Start: 2023-06-02 | End: 2023-06-02

## 2023-06-02 RX ORDER — LIDOCAINE HYDROCHLORIDE 10 MG/ML
4 INJECTION, SOLUTION INFILTRATION; PERINEURAL ONCE
Status: COMPLETED | OUTPATIENT
Start: 2023-06-02 | End: 2023-06-02

## 2023-08-07 ENCOUNTER — LAB REQUISITION (OUTPATIENT)
Dept: LAB | Age: 88
End: 2023-08-07

## 2023-08-07 LAB
ALBUMIN SERPL-MCNC: 3.3 G/DL (ref 3.6–5.1)
ALBUMIN/GLOB SERPL: 1.2 {RATIO} (ref 1–2.4)
ALP SERPL-CCNC: 76 UNITS/L (ref 45–117)
ALT SERPL-CCNC: 18 UNITS/L
ANION GAP SERPL CALC-SCNC: 13 MMOL/L (ref 7–19)
AST SERPL-CCNC: 19 UNITS/L
BASOPHILS # BLD: 0 K/MCL (ref 0–0.3)
BASOPHILS NFR BLD: 1 %
BILIRUB SERPL-MCNC: 0.4 MG/DL (ref 0.2–1)
BUN SERPL-MCNC: 37 MG/DL (ref 6–20)
BUN/CREAT SERPL: 18 (ref 7–25)
CALCIUM SERPL-MCNC: 8.7 MG/DL (ref 8.4–10.2)
CHLORIDE SERPL-SCNC: 96 MMOL/L (ref 97–110)
CO2 SERPL-SCNC: 28 MMOL/L (ref 21–32)
CREAT SERPL-MCNC: 2.08 MG/DL (ref 0.51–0.95)
DEPRECATED RDW RBC: 47.6 FL (ref 39–50)
EOSINOPHIL # BLD: 0.1 K/MCL (ref 0–0.5)
EOSINOPHIL NFR BLD: 2 %
ERYTHROCYTE [DISTWIDTH] IN BLOOD: 13.3 % (ref 11–15)
FASTING DURATION TIME PATIENT: ABNORMAL H
GFR SERPLBLD BASED ON 1.73 SQ M-ARVRAT: 23 ML/MIN
GLOBULIN SER-MCNC: 2.7 G/DL (ref 2–4)
GLUCOSE SERPL-MCNC: 113 MG/DL (ref 70–99)
HCT VFR BLD CALC: 27.1 % (ref 36–46.5)
HGB BLD-MCNC: 8.8 G/DL (ref 12–15.5)
IMM GRANULOCYTES # BLD AUTO: 0 K/MCL (ref 0–0.2)
IMM GRANULOCYTES # BLD: 0 %
LYMPHOCYTES # BLD: 1.5 K/MCL (ref 1–4)
LYMPHOCYTES NFR BLD: 25 %
MCH RBC QN AUTO: 31.3 PG (ref 26–34)
MCHC RBC AUTO-ENTMCNC: 32.5 G/DL (ref 32–36.5)
MCV RBC AUTO: 96.4 FL (ref 78–100)
MONOCYTES # BLD: 0.9 K/MCL (ref 0.3–0.9)
MONOCYTES NFR BLD: 16 %
NEUTROPHILS # BLD: 3.4 K/MCL (ref 1.8–7.7)
NEUTROPHILS NFR BLD: 56 %
NRBC BLD MANUAL-RTO: 0 /100 WBC
PLATELET # BLD AUTO: 273 K/MCL (ref 140–450)
POTASSIUM SERPL-SCNC: 3.9 MMOL/L (ref 3.4–5.1)
PROT SERPL-MCNC: 6 G/DL (ref 6.4–8.2)
RBC # BLD: 2.81 MIL/MCL (ref 4–5.2)
SODIUM SERPL-SCNC: 133 MMOL/L (ref 135–145)
WBC # BLD: 6 K/MCL (ref 4.2–11)

## 2023-08-07 PROCEDURE — 85025 COMPLETE CBC W/AUTO DIFF WBC: CPT | Performed by: CLINICAL MEDICAL LABORATORY

## 2023-08-07 PROCEDURE — 80053 COMPREHEN METABOLIC PANEL: CPT | Performed by: CLINICAL MEDICAL LABORATORY

## 2024-03-02 ENCOUNTER — APPOINTMENT (OUTPATIENT)
Dept: CT IMAGING | Facility: HOSPITAL | Age: 89
End: 2024-03-02
Attending: EMERGENCY MEDICINE
Payer: MEDICARE

## 2024-03-02 ENCOUNTER — HOSPITAL ENCOUNTER (EMERGENCY)
Facility: HOSPITAL | Age: 89
Discharge: HOME | End: 2024-03-02
Attending: EMERGENCY MEDICINE
Payer: MEDICARE

## 2024-03-02 VITALS
DIASTOLIC BLOOD PRESSURE: 79 MMHG | OXYGEN SATURATION: 99 % | HEIGHT: 60 IN | BODY MASS INDEX: 24.35 KG/M2 | WEIGHT: 124 LBS | TEMPERATURE: 97 F | RESPIRATION RATE: 14 BRPM | HEART RATE: 67 BPM | SYSTOLIC BLOOD PRESSURE: 158 MMHG

## 2024-03-02 DIAGNOSIS — R51.9 ACUTE NONINTRACTABLE HEADACHE, UNSPECIFIED HEADACHE TYPE: Primary | ICD-10-CM

## 2024-03-02 LAB
ALBUMIN SERPL-MCNC: 3.1 G/DL (ref 3.4–5)
ALBUMIN/GLOB SERPL: 0.9 {RATIO} (ref 1–2)
ALP LIVER SERPL-CCNC: 82 U/L
ALT SERPL-CCNC: 16 U/L
ANION GAP SERPL CALC-SCNC: 8 MMOL/L (ref 0–18)
AST SERPL-CCNC: 13 U/L (ref 15–37)
BASOPHILS # BLD AUTO: 0.03 X10(3) UL (ref 0–0.2)
BASOPHILS NFR BLD AUTO: 0.3 %
BILIRUB SERPL-MCNC: 0.4 MG/DL (ref 0.1–2)
BUN BLD-MCNC: 26 MG/DL (ref 9–23)
CALCIUM BLD-MCNC: 8.8 MG/DL (ref 8.5–10.1)
CHLORIDE SERPL-SCNC: 96 MMOL/L (ref 98–112)
CO2 SERPL-SCNC: 26 MMOL/L (ref 21–32)
CREAT BLD-MCNC: 2.04 MG/DL
EGFRCR SERPLBLD CKD-EPI 2021: 23 ML/MIN/1.73M2 (ref 60–?)
EOSINOPHIL # BLD AUTO: 0.13 X10(3) UL (ref 0–0.7)
EOSINOPHIL NFR BLD AUTO: 1.5 %
ERYTHROCYTE [DISTWIDTH] IN BLOOD BY AUTOMATED COUNT: 12.9 %
GLOBULIN PLAS-MCNC: 3.5 G/DL (ref 2.8–4.4)
GLUCOSE BLD-MCNC: 231 MG/DL (ref 70–99)
HCT VFR BLD AUTO: 26.1 %
HGB BLD-MCNC: 9 G/DL
IMM GRANULOCYTES # BLD AUTO: 0.05 X10(3) UL (ref 0–1)
IMM GRANULOCYTES NFR BLD: 0.6 %
LYMPHOCYTES # BLD AUTO: 1.48 X10(3) UL (ref 1–4)
LYMPHOCYTES NFR BLD AUTO: 16.6 %
MCH RBC QN AUTO: 33.1 PG (ref 26–34)
MCHC RBC AUTO-ENTMCNC: 34.5 G/DL (ref 31–37)
MCV RBC AUTO: 96 FL
MONOCYTES # BLD AUTO: 1.08 X10(3) UL (ref 0.1–1)
MONOCYTES NFR BLD AUTO: 12.1 %
NEUTROPHILS # BLD AUTO: 6.15 X10 (3) UL (ref 1.5–7.7)
NEUTROPHILS # BLD AUTO: 6.15 X10(3) UL (ref 1.5–7.7)
NEUTROPHILS NFR BLD AUTO: 68.9 %
OSMOLALITY SERPL CALC.SUM OF ELEC: 282 MOSM/KG (ref 275–295)
PLATELET # BLD AUTO: 262 10(3)UL (ref 150–450)
POTASSIUM SERPL-SCNC: 4.3 MMOL/L (ref 3.5–5.1)
PROT SERPL-MCNC: 6.6 G/DL (ref 6.4–8.2)
RBC # BLD AUTO: 2.72 X10(6)UL
SODIUM SERPL-SCNC: 130 MMOL/L (ref 136–145)
WBC # BLD AUTO: 8.9 X10(3) UL (ref 4–11)

## 2024-03-02 PROCEDURE — 80053 COMPREHEN METABOLIC PANEL: CPT | Performed by: EMERGENCY MEDICINE

## 2024-03-02 PROCEDURE — 99284 EMERGENCY DEPT VISIT MOD MDM: CPT

## 2024-03-02 PROCEDURE — 99285 EMERGENCY DEPT VISIT HI MDM: CPT

## 2024-03-02 PROCEDURE — 96374 THER/PROPH/DIAG INJ IV PUSH: CPT

## 2024-03-02 PROCEDURE — 70450 CT HEAD/BRAIN W/O DYE: CPT | Performed by: EMERGENCY MEDICINE

## 2024-03-02 PROCEDURE — 85025 COMPLETE CBC W/AUTO DIFF WBC: CPT | Performed by: EMERGENCY MEDICINE

## 2024-03-02 PROCEDURE — 96375 TX/PRO/DX INJ NEW DRUG ADDON: CPT

## 2024-03-02 RX ORDER — KETOROLAC TROMETHAMINE 15 MG/ML
15 INJECTION, SOLUTION INTRAMUSCULAR; INTRAVENOUS ONCE
Status: COMPLETED | OUTPATIENT
Start: 2024-03-02 | End: 2024-03-02

## 2024-03-02 RX ORDER — DIPHENHYDRAMINE HYDROCHLORIDE 50 MG/ML
25 INJECTION INTRAMUSCULAR; INTRAVENOUS ONCE
Status: COMPLETED | OUTPATIENT
Start: 2024-03-02 | End: 2024-03-02

## 2024-03-02 RX ORDER — METOCLOPRAMIDE HYDROCHLORIDE 5 MG/ML
10 INJECTION INTRAMUSCULAR; INTRAVENOUS ONCE
Status: COMPLETED | OUTPATIENT
Start: 2024-03-02 | End: 2024-03-02

## 2024-03-02 NOTE — ED PROVIDER NOTES
Patient Seen in: [facility] Emergency Department      History     Chief Complaint   Patient presents with    Headache     Stated Complaint: headache for 3 days    Subjective:   HPI    88-year-old female comes to the hospital been having difficulty with a headache as well as some right-sided neck pain for the last 3 days.  She rating it a 7 out of 10.  She does get history of sharp pains this particular area over the years but states never been constant since this.  She is denying any fevers or chills.  She did have some nausea earlier today.  She denies any diarrhea.  She has no cough or shortness of breath.  She has any chest pain or shortness of breath patient's abdominal pains.  She denies any numbness weakness.  She is denying any other complaints at this time.    Objective:   Past Medical History:   Diagnosis Date    Back pain     Diabetes (HCC)     Essential hypertension               History reviewed. No pertinent surgical history.             Social History     Socioeconomic History    Marital status:    Tobacco Use    Smoking status: Never    Smokeless tobacco: Never   Vaping Use    Vaping Use: Never used   Substance and Sexual Activity    Alcohol use: Not Currently    Drug use: Never   Other Topics Concern    Caffeine Concern Yes    Exercise No   Social History Narrative    The patient uses the following assistive device(s):  quad cane, rolling walker and wheelchair.      The patient does not live in a home with stairs.              Review of Systems    Positive for stated complaint: headache for 3 days  Other systems are as noted in HPI.  Constitutional and vital signs reviewed.      All other systems reviewed and negative except as noted above.    Physical Exam     ED Triage Vitals [03/02/24 1344]   BP (!) 199/77   Pulse 83   Resp 18   Temp 97.1 °F (36.2 °C)   Temp src Temporal   SpO2 96 %   O2 Device None (Room air)       Current:BP (!) 164/76   Pulse 75   Temp 97.1 °F (36.2 °C)  (Temporal)   Resp 18   Ht 152.4 cm (5')   Wt 56.2 kg   SpO2 96%   BMI 24.22 kg/m²         Physical Exam    HEENT : NCAT, EOMI, PEERL,  neck supple, no JVD, trachea midline, No LAD  Heart: S1S2 normal. No murmurs, regular rate and rhythm  Lungs: Clear to auscultation bilaterally  Abdomen: Soft nontender nondistended normal active bowel sounds without rebound, guarding or masses noted  Back nontender without CVA tenderness  Extremity bilateral lower extremity pitting edema noted.  Neuro: No focal deficits noted    All measures to prevent infection transmission during my interaction with the patient were taken.  The patient was already wearing droplet mask on my arrival to the room.  Personal protective equipment including a droplet mask as well as gloves were worn throughout the duration of my exam.  Hand washing was performed prior to and after the exam.  Stethoscope and equipment used during my examination was cleaned with a super Sani cloth germicidal wipe following the exam.    ED Course     Labs Reviewed   COMP METABOLIC PANEL (14) - Abnormal; Notable for the following components:       Result Value    Glucose 231 (*)     Sodium 130 (*)     Chloride 96 (*)     BUN 26 (*)     Creatinine 2.04 (*)     eGFR-Cr 23 (*)     AST 13 (*)     Albumin 3.1 (*)     A/G Ratio 0.9 (*)     All other components within normal limits   CBC W/ DIFFERENTIAL - Abnormal; Notable for the following components:    RBC 2.72 (*)     HGB 9.0 (*)     HCT 26.1 (*)     Monocyte Absolute 1.08 (*)     All other components within normal limits   CBC WITH DIFFERENTIAL WITH PLATELET    Narrative:     The following orders were created for panel order CBC With Differential With Platelet.  Procedure                               Abnormality         Status                     ---------                               -----------         ------                     CBC W/ DIFFERENTIAL[[id number]]          Abnormal            Final result                  Please view results for these tests on the individual orders.   RAINBOW DRAW BLUE          ED Course as of 03/02/24 1515  ------------------------------------------------------------  Time: 03/02 1513  Comment: While here the patient had a combination of Toradol, Reglan and Benadryl.  She is feeling markedly better at this time.  Patient's hemoglobin was 9 which is chronic for her.  The patient's BUN/creatinine was 26 and 2.04 which again was chronic.  Patient initially came in hypertensive but blood pressure improved with observation while here.  The patient had a CT of the brain while here which I first interpreted showed no acute abnormality.  Read the radiology report as well.     CT BRAIN OR HEAD (77212)    Result Date: 3/2/2024  PROCEDURE:  CT BRAIN OR HEAD (63788)  COMPARISON:  None.  INDICATIONS:  headache for 3 days  TECHNIQUE:  Noncontrast CT scanning is performed through the brain. Dose reduction techniques were used. Dose information is transmitted to the ACR (American College of Radiology) NRDR (National Radiology Data Registry) which includes the Dose Index Registry.  PATIENT STATED HISTORY: (As transcribed by Technologist)  Patient reports having severe headaches for the past three days. Denies blurry vision or syncope    FINDINGS:  VENTRICLES/SULCI:  Ventricles and sulci are prominent in size consistent with volume loss.   INTRACRANIAL:  There are no abnormal extraaxial fluid collections.  There is no midline shift.  There is no acute intracranial hemorrhage. Periventricular and subcortical low attenuation are nonspecific but most consistent with chronic small vessel ischemic change.  Low-density foci within the basal ganglia bilaterally are most consistent with lacunar infarcts of indeterminate age.  Punctate calcifications are present within the basal ganglia bilaterally.  SINUSES:           No sign of acute sinusitis.  Partial opacification involves the posterior ethmoid air cells bilaterally.   MASTOIDS:          No sign of acute inflammation.  SKULL:             No evidence for fracture or osseous abnormality.  OTHER:             Atherosclerosis.            CONCLUSION:  1. No acute intracranial hemorrhage.  2. Findings most consistent with chronic small vessel ischemic change within the deep white matter.  If an acute infarct is of high clinical concern, recommend MRI of the brain for further evaluation.    LOCATION:  [facility]   Dictated by (CST): Kori Hare MD on 3/02/2024 at 3:05 PM     Finalized by (CST): Kori Hare MD on 3/02/2024 at 3:06 PM        Medications   metoclopramide (Reglan) 5 mg/mL injection 10 mg (10 mg Intravenous Given 3/2/24 1421)   ketorolac (Toradol) 15 MG/ML injection 15 mg (15 mg Intravenous Given 3/2/24 1420)   diphenhydrAMINE (Benadryl) 50 mg/mL  injection 25 mg (25 mg Intravenous Given 3/2/24 1421)              MDM      Differential diagnosis does include acute intracerebral hemorrhage, sinusitis and other pathologies but not limited such.  The patient here is now pain-free after the above and will be discharged home with outpatient management follow-up.      Patient was screened and evaluated during this visit.   As a treating physician attending to the patient, I determined, within reasonable clinical confidence and prior to discharge, that an emergency medical condition was not or was no longer present.  There was no indication for further evaluation, treatment or admission on an emergency basis.       The usual and customary discharge instuctions were discussed given the patient's ER course.  We discussed signs and symptoms that should prompt the patient's immediate return to the emergency department.   Reasonable over the counter and prescription treatment options and Physician follow up plan was discussed.       The patient is discharged in good condition.       This note was prepared using Dragon Medical voice recognition dictation software.  As a result errors may occur.   When identified to these areas have been corrected.  While every attempt is made to correct errors during dictation discrepancies may still exist.  Please contact if there are any errors.                                   Medical Decision Making      Disposition and Plan     Clinical Impression:  1. Acute nonintractable headache, unspecified headache type         Disposition:  Discharge  3/2/2024  3:14 pm    Follow-up:  Breonna Cabral MD  [address] [facility] [id number]  [phone]    Schedule an appointment as soon as possible for a visit in 2 day(s)            Medications Prescribed:  Current Discharge Medication List

## 2024-03-04 ENCOUNTER — APPOINTMENT (OUTPATIENT)
Dept: GENERAL RADIOLOGY | Facility: HOSPITAL | Age: 89
End: 2024-03-04
Attending: EMERGENCY MEDICINE
Payer: MEDICARE

## 2024-03-04 ENCOUNTER — HOSPITAL ENCOUNTER (INPATIENT)
Facility: HOSPITAL | Age: 89
LOS: 4 days | Discharge: NURSING, REHAB OR LONG-TERM CARE | End: 2024-03-08
Attending: EMERGENCY MEDICINE | Admitting: INTERNAL MEDICINE
Payer: MEDICARE

## 2024-03-04 DIAGNOSIS — I50.9 ACUTE ON CHRONIC CONGESTIVE HEART FAILURE, UNSPECIFIED HEART FAILURE TYPE (HCC): Primary | ICD-10-CM

## 2024-03-04 DIAGNOSIS — R09.02 HYPOXIA: ICD-10-CM

## 2024-03-04 PROBLEM — R73.9 HYPERGLYCEMIA: Status: ACTIVE | Noted: 2024-03-04

## 2024-03-04 PROBLEM — E87.1 HYPONATREMIA: Status: ACTIVE | Noted: 2024-03-04

## 2024-03-04 PROBLEM — D64.9 ANEMIA: Status: ACTIVE | Noted: 2024-03-04

## 2024-03-04 LAB
ALBUMIN SERPL-MCNC: 3.1 G/DL (ref 3.4–5)
ALBUMIN/GLOB SERPL: 0.9 {RATIO} (ref 1–2)
ALP LIVER SERPL-CCNC: 84 U/L
ALT SERPL-CCNC: 13 U/L
ANION GAP SERPL CALC-SCNC: 5 MMOL/L (ref 0–18)
AST SERPL-CCNC: 16 U/L (ref 15–37)
BASOPHILS # BLD AUTO: 0.02 X10(3) UL (ref 0–0.2)
BASOPHILS NFR BLD AUTO: 0.3 %
BILIRUB SERPL-MCNC: 0.5 MG/DL (ref 0.1–2)
BUN BLD-MCNC: 25 MG/DL (ref 9–23)
CALCIUM BLD-MCNC: 8.9 MG/DL (ref 8.5–10.1)
CHLORIDE SERPL-SCNC: 98 MMOL/L (ref 98–112)
CO2 SERPL-SCNC: 27 MMOL/L (ref 21–32)
CREAT BLD-MCNC: 2.08 MG/DL
DIGOXIN SERPL-MCNC: 0.08 NG/ML (ref 0.8–2)
EGFRCR SERPLBLD CKD-EPI 2021: 23 ML/MIN/1.73M2 (ref 60–?)
EOSINOPHIL # BLD AUTO: 0.07 X10(3) UL (ref 0–0.7)
EOSINOPHIL NFR BLD AUTO: 0.9 %
ERYTHROCYTE [DISTWIDTH] IN BLOOD BY AUTOMATED COUNT: 12.8 %
GLOBULIN PLAS-MCNC: 3.4 G/DL (ref 2.8–4.4)
GLUCOSE BLD-MCNC: 159 MG/DL (ref 70–99)
GLUCOSE BLD-MCNC: 196 MG/DL (ref 70–99)
GLUCOSE BLD-MCNC: 196 MG/DL (ref 70–99)
GLUCOSE BLD-MCNC: 204 MG/DL (ref 70–99)
HCT VFR BLD AUTO: 24.8 %
HGB BLD-MCNC: 8.4 G/DL
IMM GRANULOCYTES # BLD AUTO: 0.04 X10(3) UL (ref 0–1)
IMM GRANULOCYTES NFR BLD: 0.5 %
LYMPHOCYTES # BLD AUTO: 1.32 X10(3) UL (ref 1–4)
LYMPHOCYTES NFR BLD AUTO: 16.6 %
MCH RBC QN AUTO: 33.1 PG (ref 26–34)
MCHC RBC AUTO-ENTMCNC: 33.9 G/DL (ref 31–37)
MCV RBC AUTO: 97.6 FL
MONOCYTES # BLD AUTO: 1 X10(3) UL (ref 0.1–1)
MONOCYTES NFR BLD AUTO: 12.6 %
NEUTROPHILS # BLD AUTO: 5.49 X10 (3) UL (ref 1.5–7.7)
NEUTROPHILS # BLD AUTO: 5.49 X10(3) UL (ref 1.5–7.7)
NEUTROPHILS NFR BLD AUTO: 69.1 %
NT-PROBNP SERPL-MCNC: 9110 PG/ML (ref ?–450)
OSMOLALITY SERPL CALC.SUM OF ELEC: 280 MOSM/KG (ref 275–295)
PLATELET # BLD AUTO: 250 10(3)UL (ref 150–450)
POTASSIUM SERPL-SCNC: 4.1 MMOL/L (ref 3.5–5.1)
PROT SERPL-MCNC: 6.5 G/DL (ref 6.4–8.2)
RBC # BLD AUTO: 2.54 X10(6)UL
SODIUM SERPL-SCNC: 130 MMOL/L (ref 136–145)
TROPONIN I SERPL HS-MCNC: 24 NG/L
WBC # BLD AUTO: 7.9 X10(3) UL (ref 4–11)

## 2024-03-04 PROCEDURE — 85025 COMPLETE CBC W/AUTO DIFF WBC: CPT

## 2024-03-04 PROCEDURE — 93005 ELECTROCARDIOGRAM TRACING: CPT

## 2024-03-04 PROCEDURE — 85025 COMPLETE CBC W/AUTO DIFF WBC: CPT | Performed by: EMERGENCY MEDICINE

## 2024-03-04 PROCEDURE — 80053 COMPREHEN METABOLIC PANEL: CPT | Performed by: EMERGENCY MEDICINE

## 2024-03-04 PROCEDURE — 71045 X-RAY EXAM CHEST 1 VIEW: CPT | Performed by: EMERGENCY MEDICINE

## 2024-03-04 PROCEDURE — 80053 COMPREHEN METABOLIC PANEL: CPT

## 2024-03-04 PROCEDURE — 83880 ASSAY OF NATRIURETIC PEPTIDE: CPT | Performed by: EMERGENCY MEDICINE

## 2024-03-04 PROCEDURE — 99285 EMERGENCY DEPT VISIT HI MDM: CPT

## 2024-03-04 PROCEDURE — 96374 THER/PROPH/DIAG INJ IV PUSH: CPT

## 2024-03-04 PROCEDURE — 82962 GLUCOSE BLOOD TEST: CPT

## 2024-03-04 PROCEDURE — 93010 ELECTROCARDIOGRAM REPORT: CPT

## 2024-03-04 PROCEDURE — 83036 HEMOGLOBIN GLYCOSYLATED A1C: CPT | Performed by: INTERNAL MEDICINE

## 2024-03-04 PROCEDURE — 84484 ASSAY OF TROPONIN QUANT: CPT | Performed by: EMERGENCY MEDICINE

## 2024-03-04 PROCEDURE — 80162 ASSAY OF DIGOXIN TOTAL: CPT | Performed by: EMERGENCY MEDICINE

## 2024-03-04 RX ORDER — BISACODYL 10 MG
10 SUPPOSITORY, RECTAL RECTAL
Status: DISCONTINUED | OUTPATIENT
Start: 2024-03-04 | End: 2024-03-08

## 2024-03-04 RX ORDER — DULOXETIN HYDROCHLORIDE 20 MG/1
20 CAPSULE, DELAYED RELEASE ORAL DAILY
Status: DISCONTINUED | OUTPATIENT
Start: 2024-03-05 | End: 2024-03-08

## 2024-03-04 RX ORDER — ONDANSETRON 2 MG/ML
4 INJECTION INTRAMUSCULAR; INTRAVENOUS EVERY 6 HOURS PRN
Status: DISCONTINUED | OUTPATIENT
Start: 2024-03-04 | End: 2024-03-08

## 2024-03-04 RX ORDER — TORSEMIDE 20 MG/1
20 TABLET ORAL DAILY
COMMUNITY
Start: 2023-08-04 | End: 2024-03-08

## 2024-03-04 RX ORDER — MELATONIN
3 NIGHTLY PRN
Status: DISCONTINUED | OUTPATIENT
Start: 2024-03-04 | End: 2024-03-08

## 2024-03-04 RX ORDER — ENOXAPARIN SODIUM 100 MG/ML
40 INJECTION SUBCUTANEOUS DAILY
Status: DISCONTINUED | OUTPATIENT
Start: 2024-03-05 | End: 2024-03-04

## 2024-03-04 RX ORDER — GABAPENTIN 600 MG/1
600 TABLET ORAL DAILY
Status: DISCONTINUED | OUTPATIENT
Start: 2024-03-04 | End: 2024-03-06

## 2024-03-04 RX ORDER — ACETAMINOPHEN 500 MG
500 TABLET ORAL EVERY 4 HOURS PRN
Status: DISCONTINUED | OUTPATIENT
Start: 2024-03-04 | End: 2024-03-08

## 2024-03-04 RX ORDER — ISOSORBIDE MONONITRATE 30 MG/1
30 TABLET, EXTENDED RELEASE ORAL DAILY
Status: DISCONTINUED | OUTPATIENT
Start: 2024-03-05 | End: 2024-03-08

## 2024-03-04 RX ORDER — HYDRALAZINE HYDROCHLORIDE 50 MG/1
50 TABLET, FILM COATED ORAL 2 TIMES DAILY
COMMUNITY
Start: 2024-02-07 | End: 2024-05-07

## 2024-03-04 RX ORDER — INSULIN DEGLUDEC 100 U/ML
15 INJECTION, SOLUTION SUBCUTANEOUS DAILY
Status: DISCONTINUED | OUTPATIENT
Start: 2024-03-05 | End: 2024-03-07

## 2024-03-04 RX ORDER — ASPIRIN 81 MG/1
81 TABLET ORAL DAILY
Status: DISCONTINUED | OUTPATIENT
Start: 2024-03-05 | End: 2024-03-08

## 2024-03-04 RX ORDER — ISOSORBIDE MONONITRATE 30 MG/1
1 TABLET, EXTENDED RELEASE ORAL DAILY
COMMUNITY
Start: 2022-12-08

## 2024-03-04 RX ORDER — ATORVASTATIN CALCIUM 10 MG/1
10 TABLET, FILM COATED ORAL DAILY
Status: DISCONTINUED | OUTPATIENT
Start: 2024-03-05 | End: 2024-03-08

## 2024-03-04 RX ORDER — METOCLOPRAMIDE HYDROCHLORIDE 5 MG/ML
5 INJECTION INTRAMUSCULAR; INTRAVENOUS EVERY 8 HOURS PRN
Status: DISCONTINUED | OUTPATIENT
Start: 2024-03-04 | End: 2024-03-08

## 2024-03-04 RX ORDER — POLYETHYLENE GLYCOL 3350 17 G/17G
17 POWDER, FOR SOLUTION ORAL DAILY PRN
Status: DISCONTINUED | OUTPATIENT
Start: 2024-03-04 | End: 2024-03-08

## 2024-03-04 RX ORDER — ACETAMINOPHEN 325 MG/1
650 TABLET ORAL EVERY 6 HOURS
COMMUNITY
Start: 2023-08-07

## 2024-03-04 RX ORDER — CARVEDILOL 12.5 MG/1
12.5 TABLET ORAL 2 TIMES DAILY WITH MEALS
Status: DISCONTINUED | OUTPATIENT
Start: 2024-03-05 | End: 2024-03-08

## 2024-03-04 RX ORDER — DEXTROSE MONOHYDRATE 25 G/50ML
50 INJECTION, SOLUTION INTRAVENOUS
Status: DISCONTINUED | OUTPATIENT
Start: 2024-03-04 | End: 2024-03-08

## 2024-03-04 RX ORDER — NICOTINE POLACRILEX 4 MG
15 LOZENGE BUCCAL
Status: DISCONTINUED | OUTPATIENT
Start: 2024-03-04 | End: 2024-03-08

## 2024-03-04 RX ORDER — FUROSEMIDE 10 MG/ML
40 INJECTION INTRAMUSCULAR; INTRAVENOUS ONCE
Status: COMPLETED | OUTPATIENT
Start: 2024-03-04 | End: 2024-03-04

## 2024-03-04 RX ORDER — SENNOSIDES 8.6 MG
17.2 TABLET ORAL NIGHTLY PRN
Status: DISCONTINUED | OUTPATIENT
Start: 2024-03-04 | End: 2024-03-08

## 2024-03-04 RX ORDER — NICOTINE POLACRILEX 4 MG
30 LOZENGE BUCCAL
Status: DISCONTINUED | OUTPATIENT
Start: 2024-03-04 | End: 2024-03-08

## 2024-03-04 RX ORDER — HYDRALAZINE HYDROCHLORIDE 50 MG/1
50 TABLET, FILM COATED ORAL 2 TIMES DAILY
Status: DISCONTINUED | OUTPATIENT
Start: 2024-03-04 | End: 2024-03-08

## 2024-03-04 RX ORDER — HEPARIN SODIUM 5000 [USP'U]/ML
5000 INJECTION, SOLUTION INTRAVENOUS; SUBCUTANEOUS EVERY 8 HOURS SCHEDULED
Status: DISCONTINUED | OUTPATIENT
Start: 2024-03-04 | End: 2024-03-08

## 2024-03-04 NOTE — ED PROVIDER NOTES
Patient Seen in: [facility] Emergency Department      History     Chief Complaint   Patient presents with    Difficulty Breathing     Stated Complaint: r/o fluid overload, FLORENCIA    Subjective:   HPI    Patient is a 88-year-old female who lives in assisted living.  Patient is daughter states over the past 5 days she has been having increasing swollen legs.  Patient has a history of CHF does take Lasix 20 mg daily.  Patient has no chest pain.  Patient denies fevers or chills.  Patient has slight nonproductive cough.  No abdominal pain.  Patient and daughter do not do daily weights.  Remainder of review of systems negative.    Objective:   Past Medical History:   Diagnosis Date    Back pain     Diabetes (HCC)     Essential hypertension               History reviewed. No pertinent surgical history.             Social History     Socioeconomic History    Marital status:    Tobacco Use    Smoking status: Never    Smokeless tobacco: Never   Vaping Use    Vaping Use: Never used   Substance and Sexual Activity    Alcohol use: Not Currently    Drug use: Never   Other Topics Concern    Caffeine Concern Yes    Exercise No   Social History Narrative    The patient uses the following assistive device(s):  quad cane, rolling walker and wheelchair.      The patient does not live in a home with stairs.              Review of Systems    Positive for stated complaint: r/o fluid overload, FLORENCIA  Other systems are as noted in HPI.  Constitutional and vital signs reviewed.      All other systems reviewed and negative except as noted above.    Physical Exam     ED Triage Vitals [03/04/24 1437]   /76   Pulse 83   Resp 18   Temp 98 °F (36.7 °C)   Temp src Temporal   SpO2 93 %   O2 Device None (Room air)       Current:/64   Pulse 66   Temp 98 °F (36.7 °C) (Temporal)   Resp 14   Ht 152.4 cm (5')   Wt 56.2 kg   SpO2 99%   BMI 24.22 kg/m²         Physical Exam  GENERAL: Patient resting on the cart in no acute  distress.  HEENT: Extraocular muscles intact, pupils equal round reactive to light, neck supple, no meningismus.  LUNGS: Lungs clear to auscultation bilaterally.  CARDIOVASCULAR: + S1-S2, regular rate and rhythm, no murmurs.  BACK: No CVA tenderness, no midline bony tenderness.  ABDOMEN: + Bowel sounds, soft, nontender, nondistended.  No rebound, no guarding, no hepatosplenomegaly.  EXTREMITIES: Full range of motion, no tenderness, good capillary refill.  2-3+ edema to the knees.  SKIN: No rash, good turgor.  NEURO: Patient answers questions appropriately.  No focal deficits appreciated.           ED Course     Labs Reviewed   COMP METABOLIC PANEL (14) - Abnormal; Notable for the following components:       Result Value    Glucose 196 (*)     Sodium 130 (*)     BUN 25 (*)     Creatinine 2.08 (*)     eGFR-Cr 23 (*)     Albumin 3.1 (*)     A/G Ratio 0.9 (*)     All other components within normal limits   PRO BETA NATRIURETIC PEPTIDE - Abnormal; Notable for the following components:    Pro-Beta Natriuretic Peptide 9,110 (*)     All other components within normal limits   DIGOXIN (LANOXIN) - Abnormal; Notable for the following components:    Digoxin 0.08 (*)     All other components within normal limits   CBC W/ DIFFERENTIAL - Abnormal; Notable for the following components:    RBC 2.54 (*)     HGB 8.4 (*)     HCT 24.8 (*)     All other components within normal limits   TROPONIN I HIGH SENSITIVITY - Normal   CBC WITH DIFFERENTIAL WITH PLATELET    Narrative:     The following orders were created for panel order CBC With Differential With Platelet.  Procedure                               Abnormality         Status                     ---------                               -----------         ------                     CBC W/ DIFFERENTIAL[[id number]]          Abnormal            Final result                 Please view results for these tests on the individual orders.   RAINBOW DRAW LAVENDER   RAINBOW DRAW LIGHT GREEN    RAINBOW DRAW BLUE     EKG    Rate, intervals and axes as noted on EKG Report.  Rate: 72  Rhythm: Sinus Rhythm  Reading: Normal sinus rhythm, nonspecific ST changes            Chest x-ray1. Mild interstitial opacities likely representing mild edema.   2. Left basilar opacity representing atelectasis versus infiltrates.   3. Minimal right basilar atelectasis   4. Small left-sided pleural effusion.       Independent reviewed by myself, no pneumothorax         MDM      Patient's pulse ox did drop to 89% on room air.  Patient was started on oxygen.  Patient have elevated BNP.  Patient chest x-ray concerning for edema.  Patient given 40 Lasix IV.  Patient admitted for further evaluation.  Did consider CHF, ACS, pneumothorax, pneumonia.  [facility]  I did speak with cardiology.  Admission disposition: 3/4/2024  6:17 PM                                        Medical Decision Making      Disposition and Plan     Clinical Impression:  1. Acute on chronic congestive heart failure, unspecified heart failure type (HCC)    2. Hypoxia         Disposition:  Admit  3/4/2024  6:17 pm    Follow-up:  No follow-up provider specified.        Medications Prescribed:  Current Discharge Medication List                            Hospital Problems       Present on Admission  Date Reviewed: 5/30/2023            ICD-10-CM Noted POA    * (Principal) Acute on chronic congestive heart failure, unspecified heart failure type (HCC) I50.9 3/4/2024 Unknown    Anemia D64.9 3/4/2024 Yes    Hyperglycemia R73.9 3/4/2024 Yes    Hyponatremia E87.1 3/4/2024 Yes

## 2024-03-04 NOTE — ED QUICK NOTES
Patient placed on purwick    Patient understands concept of external catheter used as a suction device to obtain urine.

## 2024-03-05 ENCOUNTER — APPOINTMENT (OUTPATIENT)
Dept: CV DIAGNOSTICS | Facility: HOSPITAL | Age: 89
End: 2024-03-05
Payer: MEDICARE

## 2024-03-05 LAB
ANION GAP SERPL CALC-SCNC: 7 MMOL/L (ref 0–18)
ATRIAL RATE: 72 BPM
BASOPHILS # BLD AUTO: 0.02 X10(3) UL (ref 0–0.2)
BASOPHILS NFR BLD AUTO: 0.4 %
BUN BLD-MCNC: 24 MG/DL (ref 9–23)
CALCIUM BLD-MCNC: 8.7 MG/DL (ref 8.5–10.1)
CHLORIDE SERPL-SCNC: 99 MMOL/L (ref 98–112)
CO2 SERPL-SCNC: 26 MMOL/L (ref 21–32)
CREAT BLD-MCNC: 1.89 MG/DL
EGFRCR SERPLBLD CKD-EPI 2021: 25 ML/MIN/1.73M2 (ref 60–?)
EOSINOPHIL # BLD AUTO: 0.16 X10(3) UL (ref 0–0.7)
EOSINOPHIL NFR BLD AUTO: 3.1 %
ERYTHROCYTE [DISTWIDTH] IN BLOOD BY AUTOMATED COUNT: 13 %
EST. AVERAGE GLUCOSE BLD GHB EST-MCNC: 148 MG/DL (ref 68–126)
GLUCOSE BLD-MCNC: 107 MG/DL (ref 70–99)
GLUCOSE BLD-MCNC: 112 MG/DL (ref 70–99)
GLUCOSE BLD-MCNC: 117 MG/DL (ref 70–99)
GLUCOSE BLD-MCNC: 137 MG/DL (ref 70–99)
GLUCOSE BLD-MCNC: 230 MG/DL (ref 70–99)
HBA1C MFR BLD: 6.8 % (ref ?–5.7)
HCT VFR BLD AUTO: 23.8 %
HGB BLD-MCNC: 7.9 G/DL
IMM GRANULOCYTES # BLD AUTO: 0.02 X10(3) UL (ref 0–1)
IMM GRANULOCYTES NFR BLD: 0.4 %
LYMPHOCYTES # BLD AUTO: 1.11 X10(3) UL (ref 1–4)
LYMPHOCYTES NFR BLD AUTO: 21.9 %
MCH RBC QN AUTO: 32.5 PG (ref 26–34)
MCHC RBC AUTO-ENTMCNC: 33.2 G/DL (ref 31–37)
MCV RBC AUTO: 97.9 FL
MONOCYTES # BLD AUTO: 0.95 X10(3) UL (ref 0.1–1)
MONOCYTES NFR BLD AUTO: 18.7 %
NEUTROPHILS # BLD AUTO: 2.82 X10 (3) UL (ref 1.5–7.7)
NEUTROPHILS # BLD AUTO: 2.82 X10(3) UL (ref 1.5–7.7)
NEUTROPHILS NFR BLD AUTO: 55.5 %
OSMOLALITY SERPL CALC.SUM OF ELEC: 279 MOSM/KG (ref 275–295)
P AXIS: 40 DEGREES
P-R INTERVAL: 142 MS
PLATELET # BLD AUTO: 222 10(3)UL (ref 150–450)
POTASSIUM SERPL-SCNC: 3.7 MMOL/L (ref 3.5–5.1)
Q-T INTERVAL: 418 MS
QRS DURATION: 74 MS
QTC CALCULATION (BEZET): 457 MS
R AXIS: 4 DEGREES
RBC # BLD AUTO: 2.43 X10(6)UL
SODIUM SERPL-SCNC: 132 MMOL/L (ref 136–145)
T AXIS: 50 DEGREES
VENTRICULAR RATE: 72 BPM
WBC # BLD AUTO: 5.1 X10(3) UL (ref 4–11)

## 2024-03-05 PROCEDURE — 93306 TTE W/DOPPLER COMPLETE: CPT

## 2024-03-05 PROCEDURE — 80048 BASIC METABOLIC PNL TOTAL CA: CPT | Performed by: INTERNAL MEDICINE

## 2024-03-05 PROCEDURE — 97166 OT EVAL MOD COMPLEX 45 MIN: CPT

## 2024-03-05 PROCEDURE — 97530 THERAPEUTIC ACTIVITIES: CPT

## 2024-03-05 PROCEDURE — 82962 GLUCOSE BLOOD TEST: CPT

## 2024-03-05 PROCEDURE — 97161 PT EVAL LOW COMPLEX 20 MIN: CPT

## 2024-03-05 PROCEDURE — 85025 COMPLETE CBC W/AUTO DIFF WBC: CPT | Performed by: INTERNAL MEDICINE

## 2024-03-05 RX ORDER — AMLODIPINE BESYLATE 2.5 MG/1
2.5 TABLET ORAL DAILY
Status: DISCONTINUED | OUTPATIENT
Start: 2024-03-05 | End: 2024-03-05

## 2024-03-05 RX ORDER — FUROSEMIDE 10 MG/ML
20 INJECTION INTRAMUSCULAR; INTRAVENOUS
Status: DISCONTINUED | OUTPATIENT
Start: 2024-03-05 | End: 2024-03-08

## 2024-03-05 RX ORDER — POTASSIUM CHLORIDE 20 MEQ/1
40 TABLET, EXTENDED RELEASE ORAL ONCE
Status: COMPLETED | OUTPATIENT
Start: 2024-03-05 | End: 2024-03-05

## 2024-03-05 RX ORDER — LISINOPRIL 40 MG/1
40 TABLET ORAL
Status: DISCONTINUED | OUTPATIENT
Start: 2024-03-05 | End: 2024-03-08

## 2024-03-05 NOTE — PROGRESS NOTES
03/04/24 2116 03/04/24 2118 03/04/24 2120   Vital Signs   BP (!) 162/80 (!) 161/98 (!) 162/85   MAP (mmHg) (!) 105 (!) 113 (!) 105   BP Location Right arm Right arm Right arm   BP Method Automatic Automatic Automatic   Patient Position Lying Sitting Lying     Admission orthostatic vitals.

## 2024-03-05 NOTE — PLAN OF CARE
Pt arrived to floor around 2100. Pt placed on tele monitor, orientated to room, and safety precautions initiated. A&Ox4, pt is able to give information but stated the nurses at [facility] can give information about medications. ISABELL Atwood gave information about pt's medications. L arm precautions. Remains on room air. Dyspneic on exertion. Crackles/diminished lung sounds. NSR on tele, denies cardiac symptoms. BLE swelling present, +3-4. Skin check completed with HEIDI Byrd. Blanchable redness noted to sacrum. Increased frequency/stress incontinence of urine, external catheter in place. Continent of stool. Denies pain. Pt updated on plan of care and agreeable. Fall precautions in place. Call light within reach.    Problem: Diabetes/Glucose Control  Goal: Glucose maintained within prescribed range  Description: INTERVENTIONS:  - Monitor Blood Glucose as ordered  - Assess for signs and symptoms of hyperglycemia and hypoglycemia  - Administer ordered medications to maintain glucose within target range  - Assess barriers to adequate nutritional intake and initiate nutrition consult as needed  - Instruct patient on self management of diabetes  Outcome: Progressing     Problem: Patient/Family Goals  Goal: Patient/Family Long Term Goal  Description: Patient's Long Term Goal: stay healthy at home    Interventions:  - medication compliance, stay active, attend follow up appts, monitor for new onset of s/s  - See additional Care Plan goals for specific interventions  Outcome: Progressing  Goal: Patient/Family Short Term Goal  Description: Patient's Short Term Goal: discharge    Interventions:   - MDs to follow  - See additional Care Plan goals for specific interventions  Outcome: Progressing

## 2024-03-05 NOTE — OCCUPATIONAL THERAPY NOTE
OCCUPATIONAL THERAPY EVALUATION - INPATIENT    Room Number: 2610/2610-A  Evaluation Date: 3/5/2024     Type of Evaluation: Initial  Presenting Problem: Acute on chronic CHF    Physician Order: IP Consult to Occupational Therapy  Reason for Therapy:  ADL/IADL Dysfunction and Discharge Planning      OCCUPATIONAL THERAPY ASSESSMENT   Patient is a 88 year old female admitted on 3/4/2024 with Presenting Problem: Acute on chronic CHF. Co-Morbidities : Hip fx 08/2023, CHF, DM, HLD, neuropathy, breast CA, a. fib  Patient is currently functioning near baseline with bed mobility, transfers, stating sitting balance, dynamic sitting balance, static standing balance, and functional standing tolerance.  Prior to admission, patient's baseline is assist with all ADLs and transfers to .  Patient met all OT goals at baseline assist level.  Patient reports no further questions/concerns at this time.     Patient will benefit from continued skilled OT Services at discharge to promote functional independence in home.  Anticipate patient will return home with home health OT      WEIGHT BEARING RESTRICTION                   Recommendations for nursing staff:   Transfers: 1p mod A SPT EOB > chair  Toileting location: Toilet    EVALUATION SESSION:  Patient at start of session: Semi-supine in bed  FUNCTIONAL TRANSFER ASSESSMENT  Sit to Stand: Edge of Bed  Edge of Bed: Moderate Assist (Attempted x3, two attempts > scale for weight measurement c. hand in hand assist to stand. Transfer EOB>chair to right side with 1p mod A SPT)    BED MOBILITY  Rolling: Moderate Assist  Supine to Sit : Moderate Assist  Scooting: mod A to scoot EOB    BALANCE ASSESSMENT  Static Sitting: Modified Independent  Sitting Unilateral: Modified Independent  Static Standing: Stand-by Assist (2UE support on scale handles)    FUNCTIONAL ADL ASSESSMENT       ACTIVITY TOLERANCE: Vitals stable throughout session                         O2 SATURATIONS        COGNITION  Overall Cognitive Status:  WFL - within functional limits  COGNITION ASSESSMENTS       Upper Extremity:   ROM: within functional limits   Strength: is within functional limits   Coordination:  Gross motor: WFL  Fine motor: Impaired 2/2 tremors  Sensation: Light touch:  intact    EDUCATION PROVIDED  Patient: Role of Occupational Therapy; Plan of Care; Discharge Recommendations; Functional Transfer Techniques; Posture/Positioning; Proper Body Mechanics  Patient's Response to Education: Verbalized Understanding; Returned Demonstration    Equipment used: None  Demonstrates functional use    Therapist comments: Pt agreeable to therapy. Educated pt on pain management techniques.     Patient End of Session: Up in chair;Needs met;Call light within reach;All patient questions and concerns addressed;Alarm set    OCCUPATIONAL PROFILE    HOME SITUATION  Type of Home: Assisted living facility ([facility])  Home Layout: One level;Elevator  Lives With: Staff 24 hours          Other Equipment: None                  Prior Level of Function: Assist with ADLs and functional transfers to , primarily wc bound. Walks with therapy, sometimes transfers self to  if feeling up to it.    SUBJECTIVE  \"I just want to sleep\"    PAIN ASSESSMENT  Rating: Unable to rate  Location: BLE (During functional transfers)  Management Techniques: Relaxation;Repositioning    OBJECTIVE  Precautions: Bed/chair alarm  Fall Risk: High fall risk    WEIGHT BEARING RESTRICTION                   AM-PAC ‘6-Clicks’ Inpatient Daily Activity Short Form  -   Putting on and taking off regular lower body clothing?: A Lot  -   Bathing (including washing, rinsing, drying)?: A Lot  -   Toileting, which includes using toilet, bedpan or urinal? : A Lot  -   Putting on and taking off regular upper body clothing?: A Lot  -   Taking care of personal grooming such as brushing teeth?: A Lot  -   Eating meals?: A Lot    AM-PAC Score:  Score: 12  Approx Degree of  Impairment: 66.57%  Standardized Score (AM-PAC Scale): 30.6      ADDITIONAL TESTS     NEUROLOGICAL FINDINGS        PLAN   Patient has been evaluated and presents with no skilled Occupational Therapy needs at this time.  Patient discharged from Occupational Therapy services.  Please re-order if a new functional limitation presents during this admission.      Patient Evaluation Complexity Level:   Occupational Profile/Medical History MODERATE - Expanded review of history including review of medical or therapy record   Specific performance deficits impacting engagement in ADL/IADL MODERATE  3 - 5 performance deficits   Client Assessment/Performance Deficits MODERATE - Comorbidities and min to mod modifications of tasks    Clinical Decision Making MODERATE - Analysis of occupational profile, detailed assessments, several treatment options    Overall Complexity MODERATE     OT Session Time: 15 minutes  Self-Care Home Management: 0 minutes  Therapeutic Activity: 5 minutes  Neuromuscular Re-education: 0 minutes  Therapeutic Exercise: 0 minutes  Cognitive Skills: 0 minutes  Sensory Integrative: 0 minutes  Orthotic Management and Trainin minutes  Can add/delete any of these

## 2024-03-05 NOTE — H&P
[facility] History and Physical      Chief Complaint   Patient presents with    Difficulty Breathing        PCP: Breonna Cabral MD      History of Present Illness: Patient is a 88 year old female with PMH sig for Dm2, HFpEF presents to ER accompanied by daughter for eval of increased LE edema and sob.      Somewhat non complaint w meds.  Livers in assisted living.  Swelling progressive over months.      Denies chest pain or pressure.      Some orthopnea starting last week.      Past Medical History:   Diagnosis Date    Back pain     Diabetes (HCC)     Essential hypertension       History reviewed. No pertinent surgical history.     ALL:  Allergies   Allergen Reactions    Codeine NAUSEA ONLY     Nausea        No current outpatient medications on file.       Social History     Tobacco Use    Smoking status: Never    Smokeless tobacco: Never   Substance Use Topics    Alcohol use: Not Currently        Fam Hx  History reviewed. No pertinent family history.    Review of Systems  Comprehensive ROS reviewed and negative except for what is stated in HPI.      OBJECTIVE:  /62 (BP Location: Right arm)   Pulse 74   Temp 97.6 °F (36.4 °C) (Oral)   Resp 15   Ht 5' (1.524 m)   Wt 131 lb 1.6 oz (59.5 kg)   SpO2 96%   BMI 25.60 kg/m²   General:  Alert, no distress, appears stated age.    Head:  Normocephalic, without obvious abnormality, atraumatic.   Eyes:  Sclera anicteric, No conjunctival pallor, EOMs intact.    Nose: Nares normal. Septum midline. Mucosa normal. No drainage.   Throat: Lips, mucosa, and tongue normal. Teeth and gums normal.   Neck: Supple, symmetrical, trachea midline, no cervical or supraclavicular lymph adenopathy, thyroid: no enlargment/tenderness/nodules appreciated   Lungs:   Crackles bl bases   Chest wall:  No tenderness or deformity.   Heart:  Regular rate and rhythm, S1, S2 normal, no murmur, rub or gallop appreciated   Abdomen:   Soft, non-tender. Bowel sounds normal. No masses,  No  organomegaly. Non distended   Extremities: 2-3+ edema   Skin: Skin color, texture, turgor normal. No rashes or lesions.    Neurologic: Normal strength, no focal deficit appreciated     Data Review:    LABS:   Lab Results   Component Value Date    WBC 5.1 03/05/2024    HGB 7.9 03/05/2024    HCT 23.8 03/05/2024    .0 03/05/2024    CREATSERUM 1.89 03/05/2024    BUN 24 03/05/2024     03/05/2024    K 3.7 03/05/2024    CL 99 03/05/2024    CO2 26.0 03/05/2024     03/05/2024    CA 8.7 03/05/2024    ALB 3.1 03/04/2024    ALKPHO 84 03/04/2024    BILT 0.5 03/04/2024    TP 6.5 03/04/2024    AST 16 03/04/2024    ALT 13 03/04/2024    PGLU 117 03/05/2024       CXR: image personally reviewed.      Radiology: XR CHEST AP PORTABLE  (CPT=71045)    Result Date: 3/4/2024  PROCEDURE:  XR CHEST AP PORTABLE  (CPT=71045)  TECHNIQUE:  AP chest radiograph was obtained.  COMPARISON:  None.  INDICATIONS:  r/o fluid overload, FLORENCIA  PATIENT STATED HISTORY: (As transcribed by Technologist)  Patient offered no additional history at this time.    FINDINGS:  Cardiac size is within normal limits. No pleural effusions. No pneumothorax.  Left basilar opacity.  Atheromatous calcifications of the aorta.  Minimal right basilar atelectasis.  Osteoarthritic changes within the shoulders, right greater than  left.  Mild interstitial opacities.             CONCLUSION:  1. Mild interstitial opacities likely representing mild edema. 2. Left basilar opacity representing atelectasis versus infiltrates. 3. Minimal right basilar atelectasis 4. Small left-sided pleural effusion.    LOCATION:  [id number]      Dictated by (CST): Jimenez Haley MD on 3/04/2024 at 5:17 PM     Finalized by (CST): Jimenez Haley MD on 3/04/2024 at 5:19 PM       CT BRAIN OR HEAD ([id number])    Result Date: 3/2/2024  PROCEDURE:  CT BRAIN OR HEAD ([id number])  COMPARISON:  None.  INDICATIONS:  headache for 3 days  TECHNIQUE:  Noncontrast CT scanning is performed through the brain.  Dose reduction techniques were used. Dose information is transmitted to the ACR (American College of Radiology) NRDR (National Radiology Data Registry) which includes the Dose Index Registry.  PATIENT STATED HISTORY: (As transcribed by Technologist)  Patient reports having severe headaches for the past three days. Denies blurry vision or syncope    FINDINGS:  VENTRICLES/SULCI:  Ventricles and sulci are prominent in size consistent with volume loss.   INTRACRANIAL:  There are no abnormal extraaxial fluid collections.  There is no midline shift.  There is no acute intracranial hemorrhage. Periventricular and subcortical low attenuation are nonspecific but most consistent with chronic small vessel ischemic change.  Low-density foci within the basal ganglia bilaterally are most consistent with lacunar infarcts of indeterminate age.  Punctate calcifications are present within the basal ganglia bilaterally.  SINUSES:           No sign of acute sinusitis.  Partial opacification involves the posterior ethmoid air cells bilaterally.  MASTOIDS:          No sign of acute inflammation.  SKULL:             No evidence for fracture or osseous abnormality.  OTHER:             Atherosclerosis.            CONCLUSION:  1. No acute intracranial hemorrhage.  2. Findings most consistent with chronic small vessel ischemic change within the deep white matter.  If an acute infarct is of high clinical concern, recommend MRI of the brain for further evaluation.    LOCATION:  [facility]   Dictated by (CST): Kori Hare MD on 3/02/2024 at 3:05 PM     Finalized by (CST): Kori Hare MD on 3/02/2024 at 3:06 PM          Assessment/Plan:       # acute on chronic HFpEF  - IV lasix, cards eval, daily weights  - repeat ECHO    # HTN  - Amlodipine 2.5mg daily                  - hydralazine 50mg BID                  - coreg 12.5mg BID                 - lisinopril 20mg daily     # DM2  - Hold oral hypoglycemics.    - Start SSI with qid accuchecks      #  anemia, chrornic   - will check iron studies  - hold on prbc for now    # CKD3  - cr nearbaseline     Heparin SQ          Outpatient records or previous hospital records reviewed.   [facility] hospitalist to continue to follow patient while in house  A total of 75  minutes taken with patient and coordinating care.  Greater than 50% face to face encounter.      Ander Redmond MD  [facility] Hospitalist  [phone]    **Certification      PHYSICIAN Certification of Need for Inpatient Hospitalization - Initial Certification    Patient will require inpatient services that will reasonably be expected to span two midnight's based on the clinical documentation in H+P.   Based on patients current state of illness, I anticipate that, after discharge, patient will require TBD.

## 2024-03-05 NOTE — PAYOR COMM NOTE
--------------  ADMISSION REVIEW     Payor: [employer] PPO  Subscriber #:  [id number]  Authorization Number: [id number]    Admit date: 3/4/24  Admit time:  8:59 PM       Patient Seen in: [facility] Emergency Department    History   Stated Complaint: r/o fluid overload, FLORENCIA    Patient is a 88-year-old female who lives in assisted living.  Patient is daughter states over the past 5 days she has been having increasing swollen legs.  Patient has a history of CHF does take Lasix 20 mg daily.  Patient has no chest pain.  Patient denies fevers or chills.  Patient has slight nonproductive cough.  No abdominal pain.  Patient and daughter do not do daily weights.  Remainder of review of systems negative.    Objective:   Past Medical History:   Diagnosis Date    Back pain     Diabetes (HCC)     Essential hypertension      History reviewed. No pertinent surgical history.      Physical Exam     ED Triage Vitals [03/04/24 1437]   /76   Pulse 83   Resp 18   Temp 98 °F (36.7 °C)   Temp src Temporal   SpO2 93 %   O2 Device None (Room air)     Current:/64   Pulse 66   Temp 98 °F (36.7 °C) (Temporal)   Resp 14   Ht 152.4 cm (5')   Wt 56.2 kg   SpO2 99%   BMI 24.22 kg/m²     Physical Exam  GENERAL: Patient resting on the cart in no acute distress.  HEENT: Extraocular muscles intact, pupils equal round reactive to light, neck supple, no meningismus.  LUNGS: Lungs clear to auscultation bilaterally.  CARDIOVASCULAR: + S1-S2, regular rate and rhythm, no murmurs.  BACK: No CVA tenderness, no midline bony tenderness.  ABDOMEN: + Bowel sounds, soft, nontender, nondistended.  No rebound, no guarding, no hepatosplenomegaly.  EXTREMITIES: Full range of motion, no tenderness, good capillary refill.  2-3+ edema to the knees.    Labs Reviewed   COMP METABOLIC PANEL (14) - Abnormal; Notable for the following components:       Result Value    Glucose 196 (*)     Sodium 130 (*)     BUN 25 (*)     Creatinine 2.08 (*)      eGFR-Cr 23 (*)     Albumin 3.1 (*)     A/G Ratio 0.9 (*)     All other components within normal limits   PRO BETA NATRIURETIC PEPTIDE - Abnormal; Notable for the following components:    Pro-Beta Natriuretic Peptide 9,110 (*)     All other components within normal limits   DIGOXIN (LANOXIN) - Abnormal; Notable for the following components:    Digoxin 0.08 (*)     All other components within normal limits   CBC W/ DIFFERENTIAL - Abnormal; Notable for the following components:    RBC 2.54 (*)     HGB 8.4 (*)     HCT 24.8 (*)     All other components within normal limits   TROPONIN I HIGH SENSITIVITY - Normal     EKG 72  Normal sinus rhythm, nonspecific ST changes    Chest x-ray  1. Mild interstitial opacities likely representing mild edema.   2. Left basilar opacity representing atelectasis versus infiltrates.   3. Minimal right basilar atelectasis   4. Small left-sided pleural effusion.       MDM      Patient's pulse ox did drop to 89% on room air.  Patient was started on oxygen.  Patient have elevated BNP.  Patient chest x-ray concerning for edema.  Patient given 40 Lasix IV.  Patient admitted for further evaluation.  Did consider CHF, ACS, pneumothorax, pneumonia.  [facility] did request cardiology.  I did speak with cardiology.     Disposition and Plan     Clinical Impression:  1. Acute on chronic congestive heart failure, unspecified heart failure type (HCC)    2. Hypoxia       Hospital Problems       Present on Admission  Date Reviewed: 5/30/2023            ICD-10-CM Noted POA    * (Principal) Acute on chronic congestive heart failure, unspecified heart failure type (HCC) I50.9 3/4/2024 Unknown    Anemia D64.9 3/4/2024 Yes    Hyperglycemia R73.9 3/4/2024 Yes    Hyponatremia E87.1 3/4/2024 Yes             03/04/24 2116 03/04/24 2118 03/04/24 2120   Vital Signs   BP (!) 162/80 (!) 161/98 (!) 162/85   MAP (mmHg) (!) 105 (!) 113 (!) 105   BP Location Right arm Right arm Right arm   BP  Method Automatic Automatic Automatic   Patient Position Lying Sitting Lying      Admission orthostatic vitals.    3/5:    History and Physical        History of Present Illness: Patient is a 88 year old female with PMH sig for Dm2, HFpEF presents to ER accompanied by daughter for eval of increased LE edema and sob.       Somewhat non complaint w meds.  Livers in assisted living.  Swelling progressive over months.       Some orthopnea starting last week.    OBJECTIVE:  /62 (BP Location: Right arm)   Pulse 74   Temp 97.6 °F (36.4 °C) (Oral)   Resp 15   Ht 5' (1.524 m)   Wt 131 lb 1.6 oz (59.5 kg)   SpO2 96%   BMI 25.60 kg/m²     Lungs:   Crackles bl bases   Extremities: 2-3+ edema     Lab Results   Component Value Date     WBC 5.1 03/05/2024     HGB 7.9 03/05/2024     HCT 23.8 03/05/2024     .0 03/05/2024     CREATSERUM 1.89 03/05/2024     BUN 24 03/05/2024      03/05/2024     K 3.7 03/05/2024     CL 99 03/05/2024     CO2 26.0 03/05/2024      03/05/2024     CA 8.7 03/05/2024     PGLU 117 03/05/2024     Assessment/Plan:         # acute on chronic HFpEF  - IV lasix, cards eval, daily weights  - repeat ECHO     # HTN  - Amlodipine 2.5mg daily                  - hydralazine 50mg BID                  - coreg 12.5mg BID                 - lisinopril 20mg daily      # DM2  - Hold oral hypoglycemics.    - Start SSI with qid accuchecks      # anemia, chrornic   - will check iron studies  - hold on prbc for now     # CKD3  - cr nearbaseline     Heparin SQ        CARDS:    Shi Dhillon is a a(n) 88 year old female with PMH HTN, HFpEF, DM2 who presented to the ER yesterday with increasing dyspnea and LE edema. Does not weight self regularly. Lives in assisted living. States her LE swelling has been present for some time and has slowly increased. Started feeling short of breath with orthoptnea last week. Denies any associated chest pain, palpitations, or syncope.      Physical Exam:  Blood  pressure 146/62, pulse 74, temperature 97.6 °F (36.4 °C), temperature source Oral, resp. rate 15, height 5' (1.524 m), weight 131 lb 1.6 oz (59.5 kg), SpO2 96%.    Wt Readings from Last 3 Encounters:   24 131 lb 1.6 oz (59.5 kg)   24 124 lb (56.2 kg)   23 125 lb (56.7 kg)     Cardiovascular:      Rate and Rhythm: Normal rate and regular rhythm.      Pulses: Normal pulses.      Heart sounds: Murmur heard.      Harsh midsystolic murmur is present with a grade of 2/6 at the upper right sternal border.   Pulmonary:      Effort: Pulmonary effort is normal.      Breath sounds: Examination of the right-lower field reveals rales. Examination of the left-lower field reveals rales. Rales present. No rhonchi.   Musculoskeletal:      Right lower le+ Pitting Edema present.      Left lower le+ Pitting Edema present.     EK2024  NSR nonspecific t wave changes     Assessment/Plan:     Acute on chronic HFpEF  LVEF 60% 2022  Weight 131# today  On room air   Somewhat decompensated on exam  CXR with pulmonary edema  Pro BNP 9, 110  - repeat echocardiogram  - Daily weight  - strict I & O  - Home meds:                  - Digoxin 0.125mg daily                  - isosorbide mononitrate 30mg                  - hydralazine 50mg BID                  -torsemide 20mg daily                  - coreg 12.5mg BID   - Continue hydralazine, isosorbide, coreg, lisinopril,   - START 20mg IV lasix BID      HTN  Blood pressure 140-170/60-80s  - home meds                 - Amlodipine 2.5mg daily                  - hydralazine 50mg BID                  - coreg 12.5mg BID                 - lisinopril 20mg daily   - Continue hydralazine, isosorbide, coreg, lisinopril,   - Resume home amlodipine      HLD  - Continue home atorvastatin 10mg and ASA      Anemia  Hgb 7.9  Recommend transfusion to keep Hgb >8  Workup and transfusion per primary service      CKD  Cr 1.89, appears to be at baseline   IV diuretics as above,  closely monitor renal function      EMI Suarez    Pt seen and examined independently.  Agree with above.     Progressive LE edema, abdominal bloating, and exertional dyspnea noted.  Pt has had similar Sx in the past and responded to diuretic Rx.  No chest pain of anginal form.  No palpitations.  Denies recent changes in medication.     CV:  RRR  Resp:  CTA B  Abd:  Soft.  Non-tender.  Mild distention.  +BS, NA  Ext:  2+ edema     Plan:  IV Lasix  Stop Amlodipine - potential contributor to edema  Increase ACE I  Coreg  -Titrate as hemodynamics dictate  5.    Titrate Hydralazine as BP dictates                 -Hopefully wean odd as becomes euvolemic  7.      Anemia per primary Svc  8.      Echo  9.      F/U BMP / CBC              MEDICATIONS ADMINISTERED IN LAST 1 DAY:  amLODIPine (Norvasc) tab 2.5 mg       Date Action Dose Route User    3/5/2024 0922 Given 2.5 mg Oral Esther Arguello RN          aspirin DR tab 81 mg       Date Action Dose Route User    3/5/2024 0901 Given 81 mg Oral Esther Arguello RN          atorvastatin (Lipitor) tab 10 mg       Date Action Dose Route User    3/5/2024 0902 Given 10 mg Oral Esther Arguello RN          carvedilol (Coreg) tab 12.5 mg       Date Action Dose Route User    3/5/2024 0901 Given 12.5 mg Oral Esther Arguello RN          furosemide (Lasix) 10 mg/mL injection 40 mg       Date Action Dose Route User    3/4/2024 1725 Given 40 mg Intravenous Mayo Doe RN          furosemide (Lasix) 10 mg/mL injection 20 mg       Date Action Dose Route User    3/5/2024 0922 Given 20 mg Intravenous Esther Arguello RN          heparin (Porcine) 5000 UNIT/ML injection 5,000 Units       Date Action Dose Route User    3/5/2024 0508 Given 5,000 Units Subcutaneous (Left Lower Abdomen) Mora Schmitz RN    3/4/2024 2242 Given 5,000 Units Subcutaneous (Right Lower Abdomen) Mora Schmitz RN          hydrALAZINE (Apresoline) tab 50 mg       Date Action Dose Route User     3/5/2024 0902 Given 50 mg Oral Esther Arguello RN    3/4/2024 2242 Given 50 mg Oral Mora Schmitz RN          insulin aspart (NovoLOG) 100 Units/mL FlexPen 2-10 Units       Date Action Dose Route User    3/5/2024 1229 Given 6 Units Subcutaneous (Left Upper Arm) Esther Arguello RN    3/4/2024 2340 Given 3 Units Subcutaneous (Left Upper Arm) Mora Schmitz RN          insulin degludec 100 units/mL flextouch 15 Units       Date Action Dose Route User    3/5/2024 0919 Given 15 Units Subcutaneous (Right Upper Arm) Esther Arguello RN          isosorbide mononitrate ER (Imdur) 24 hr tab 30 mg       Date Action Dose Route User    3/5/2024 0901 Given 30 mg Oral Esther Arguello RN          potassium chloride (K-Dur) tab 40 mEq       Date Action Dose Route User    3/5/2024 0902 Given 40 mEq Oral Esther Arguello RN

## 2024-03-05 NOTE — PHYSICAL THERAPY NOTE
PHYSICAL THERAPY EVALUATION - INPATIENT     Room Number: 2610/2610-A  Evaluation Date: 3/5/2024  Type of Evaluation: Initial  Physician Order: PT Eval and Treat    Presenting Problem: LE edema  Co-Morbidities : Hip fx 2023, CHF, DM, HLD, neuropathy, breast CA, a. fib  Reason for Therapy: Mobility Dysfunction and Discharge Planning    PHYSICAL THERAPY ASSESSMENT   Patient is a 88 year old female admitted 3/4/2024 for LE edema and CHF.   Patient is currently functioning at baseline with bed mobility and transfers. Prior to admission, patient's baseline is supervision.     Patient currently does not meet criteria for skilled inpatient physical therapy services, however patient will continue to benefit from daily transfers to chair with nursing staff. Pt is hereby discharged from IP PT services.  RN aware to re-order if there is a decline in mobility status.      PLAN  Patient has been evaluated and presents with no skilled Physical Therapy needs at this time.  Patient discharged from Physical Therapy services.  Please re-order if a new functional limitation presents during this admission.    GOALS  Patient was able to achieve the following goals ...    Patient was able to transfer At previous, functional level   Patient able to ambulate on level surfaces Unable before admission         HOME SITUATION  Type of Home: Assisted living facility   Home Layout: One level                Lives With: Staff 24 hours  Drives: No  Patient Owned Equipment: Rolling walker       Prior Level of [place]: Pt resides at [facility]. Pt able to perform bed mobility and transfers to W/C without assist. Staff wheels patient when out of room.     SUBJECTIVE  \"I don't feel short of breath.\"       OBJECTIVE  Precautions: Bed/chair alarm;Limb alert - left  Fall Risk: High fall risk    WEIGHT BEARING RESTRICTION  Weight Bearing Restriction: None                PAIN ASSESSMENT  Ratin          COGNITION  Overall Cognitive  Status:  WFL - within functional limits    RANGE OF MOTION AND STRENGTH ASSESSMENT  Upper extremity ROM and strength are within functional limits     Lower extremity ROM is within functional limits     Lower extremity strength is within functional limits       BALANCE  Static Sitting: Fair  Dynamic Sitting: Fair  Static Standing: Fair -  Dynamic Standing: Fair -    ADDITIONAL TESTS                                    ACTIVITY TOLERANCE                         O2 WALK  Oxygen Therapy  SPO2% on Room Air at Rest: 98    NEUROLOGICAL FINDINGS                        AM-PAC '6-Clicks' INPATIENT SHORT FORM - BASIC MOBILITY  How much difficulty does the patient currently have...  Patient Difficulty: Turning over in bed (including adjusting bedclothes, sheets and blankets)?: A Little   Patient Difficulty: Sitting down on and standing up from a chair with arms (e.g., wheelchair, bedside commode, etc.): A Little   Patient Difficulty: Moving from lying on back to sitting on the side of the bed?: A Little   How much help from another person does the patient currently need...   Help from Another: Moving to and from a bed to a chair (including a wheelchair)?: A Little   Help from Another: Need to walk in hospital room?: Total   Help from Another: Climbing 3-5 steps with a railing?: Total       AM-PAC Score:  Raw Score: 14   Approx Degree of Impairment: 61.29%   Standardized Score (AM-PAC Scale): 38.1   CMS Modifier (G-Code): CL    FUNCTIONAL ABILITY STATUS  Gait Assessment   Functional Mobility/Gait Assessment  Gait Assistance: Not tested    Skilled Therapy Provided     Bed Mobility:  Rolling: supervision  Supine to sit: supervision   Sit to supine: NT     Transfer Mobility:  Sit to stand: supervision   Stand to sit: supervision  Stand pivot transfer to chair: supervision    There-Act:  Pt performed 2 reps sit-stand to scale with assist.   VC for UE/LE placement.   Pt transferred from bed to chair/stand pivot transfer with close  supervision for safety.   Rafael good pacing and UE placement.   Pt tolerated well and denies dyspnea.     Exercise/Education Provided:  Bed mobility  Energy conservation  Functional activity tolerated  Strengthening  Transfer training    Patient End of Session: Up in chair;Needs met;Call light within reach;RN aware of session/findings;All patient questions and concerns addressed;Alarm set    Patient Evaluation Complexity Level:  History High - 3 or more personal factors and/or co-morbidities   Examination of body systems Moderate - addressing a total of 3 or more elements   Clinical Presentation Low - Stable   Clinical Decision Making Low Complexity       PT Session Time: 30 minutes  Gait Training:  minutes  Therapeutic Activity: 15 minutes  Neuromuscular Re-education:  minutes  Therapeutic Exercise:  minutes

## 2024-03-05 NOTE — CONSULTS
[facility]   part of [facility]    Report of Consultation    Shi Dhillon Patient Status:  Inpatient    1935 MRN [id number]   Location [facility] 2NE-A Attending Kristofer Redmond MD   Hosp Day # 1 PCP Breonna Cabral MD     Date of Admission:  3/4/2024  Date of Consult:  3/5/2024      Reason for Consultation:  HFpEF    History of Present Illness:  Shi Dhillon is a a(n) 88 year old female with PMH HTN, HFpEF, DM2 who presented to the ER yesterday with increasing dyspnea and LE edema. Does not weight self regularly. Lives in assisted living. States her LE swelling has been present for some time and has slowly increased. Started feeling short of breath with orthoptnea last week. Denies any associated chest pain, palpitations, or syncope.     History:  Past Medical History:   Diagnosis Date    Back pain     Diabetes (HCC)     Essential hypertension      History reviewed. No pertinent surgical history.  History reviewed. No pertinent family history.   reports that she has never smoked. She has never used smokeless tobacco. She reports that she does not currently use alcohol. She reports that she does not use drugs.    Allergies:  Allergies   Allergen Reactions    Codeine NAUSEA ONLY     Nausea       Medications:    Current Facility-Administered Medications:     potassium chloride (K-Dur) tab 40 mEq, 40 mEq, Oral, Once    acetaminophen (Tylenol Extra Strength) tab 500 mg, 500 mg, Oral, Q4H PRN    melatonin tab 3 mg, 3 mg, Oral, Nightly PRN    ondansetron (Zofran) 4 MG/2ML injection 4 mg, 4 mg, Intravenous, Q6H PRN    metoclopramide (Reglan) 5 mg/mL injection 5 mg, 5 mg, Intravenous, Q8H PRN    polyethylene glycol (PEG 3350) (Miralax) 17 g oral packet 17 g, 17 g, Oral, Daily PRN    sennosides (Senokot) tab 17.2 mg, 17.2 mg, Oral, Nightly PRN    bisacodyl (Dulcolax) 10 MG rectal suppository 10 mg, 10 mg, Rectal, Daily PRN    heparin (Porcine) 5000 UNIT/ML injection 5,000 Units,  5,000 Units, Subcutaneous, Q8H BRODY    aspirin  tab 81 mg, 81 mg, Oral, Daily    atorvastatin (Lipitor) tab 10 mg, 10 mg, Oral, Daily    carvedilol (Coreg) tab 12.5 mg, 12.5 mg, Oral, BID with meals    DULoxetine (Cymbalta)  cap 20 mg, 20 mg, Oral, Daily    gabapentin (Neurontin) tab 600 mg, 600 mg, Oral, Daily    hydrALAZINE (Apresoline) tab 50 mg, 50 mg, Oral, BID    isosorbide mononitrate ER (Imdur) 24 hr tab 30 mg, 30 mg, Oral, Daily    insulin degludec 100 units/mL flextouch 15 Units, 15 Units, Subcutaneous, Daily    lisinopril (Zestril) tab 30 mg, 30 mg, Oral, Daily@1600    glucose (Dex4) 15 GM/59ML oral liquid 15 g, 15 g, Oral, Q15 Min PRN **OR** glucose (Glutose) 40% oral gel 15 g, 15 g, Oral, Q15 Min PRN **OR** glucose-vitamin C (Dex-4) chewable tab 4 tablet, 4 tablet, Oral, Q15 Min PRN **OR** dextrose 50% injection 50 mL, 50 mL, Intravenous, Q15 Min PRN **OR** glucose (Dex4) 15 GM/59ML oral liquid 30 g, 30 g, Oral, Q15 Min PRN **OR** glucose (Glutose) 40% oral gel 30 g, 30 g, Oral, Q15 Min PRN **OR** glucose-vitamin C (Dex-4) chewable tab 8 tablet, 8 tablet, Oral, Q15 Min PRN    insulin aspart (NovoLOG) 100 Units/mL FlexPen 2-10 Units, 2-10 Units, Subcutaneous, TID AC and HS    Review of Systems:  All systems were reviewed and are negative except as described above in HPI.    Physical Exam:  Blood pressure 146/62, pulse 74, temperature 97.6 °F (36.4 °C), temperature source Oral, resp. rate 15, height 5' (1.524 m), weight 131 lb 1.6 oz (59.5 kg), SpO2 96%.  Temp (24hrs), Av °F (36.7 °C), Min:97.6 °F (36.4 °C), Max:98.5 °F (36.9 °C)    Wt Readings from Last 3 Encounters:   24 131 lb 1.6 oz (59.5 kg)   24 124 lb (56.2 kg)   23 125 lb (56.7 kg)       Telemetry: NSR     Physical Exam  Vitals and nursing note reviewed.   Constitutional:       General: She is not in acute distress.     Appearance: Normal appearance. She is well-developed. She is not ill-appearing or toxic-appearing.    Cardiovascular:      Rate and Rhythm: Normal rate and regular rhythm.      Pulses: Normal pulses.      Heart sounds: Murmur heard.      Harsh midsystolic murmur is present with a grade of 2/6 at the upper right sternal border.   Pulmonary:      Effort: Pulmonary effort is normal.      Breath sounds: Examination of the right-lower field reveals rales. Examination of the left-lower field reveals rales. Rales present. No rhonchi.   Musculoskeletal:      Right lower le+ Pitting Edema present.      Left lower le+ Pitting Edema present.   Neurological:      General: No focal deficit present.      Mental Status: She is alert and oriented to person, place, and time.   Psychiatric:         Attention and Perception: Attention normal.         Mood and Affect: Mood normal.         Behavior: Behavior is cooperative.         Cognition and Memory: Cognition normal.         Laboratories and Data:  Diagnostics:  EK2024  NSR nonspecific t wave changes    CXR:  1. Mild interstitial opacities likely representing mild edema.   2. Left basilar opacity representing atelectasis versus infiltrates.   3. Minimal right basilar atelectasis   4. Small left-sided pleural effusion.       Echo 2022  CONCLUSIONS    Normal left ventricular cavity size and global ejection fraction at 60 %    Moderate left ventricular hypertrophy.     Left ventricular diastolic dysfunction, stage II.    Mild to moderate on rheumatic thickening of mitral valve is present.     Normal mobility of mitral valve leaflets.     Mild to moderate mitral regurgitation.     Normal left atrium and right sided chambers.    Mildly dilated inferior vena cava.     No pericardial effusion.       Labs:   Lab Results   Component Value Date    WBC 5.1 2024    RBC 2.43 2024    HGB 7.9 2024    HCT 23.8 2024    MCV 97.9 2024    MCH 32.5 2024    MCHC 33.2 2024    RDW 13.0 2024    .0 2024     No results found  for: \"PT\", \"INR\"    Assessment/Plan:    Acute on chronic HFpEF  LVEF 60% 03/2022  Weight 131# today  On room air   Somewhat decompensated on exam  CXR with pulmonary edema  Pro BNP 9, 110  - repeat echocardiogram  - Daily weight  - strict I & O  - Home meds:    - Digoxin 0.125mg daily    - isosorbide mononitrate 30mg    - hydralazine 50mg BID    -torsemide 20mg daily    - coreg 12.5mg BID   - Continue hydralazine, isosorbide, coreg, lisinopril,   - START 20mg IV lasix BID     HTN  Blood pressure 140-170/60-80s  - home meds   - Amlodipine 2.5mg daily    - hydralazine 50mg BID    - coreg 12.5mg BID   - lisinopril 20mg daily   - Continue hydralazine, isosorbide, coreg, lisinopril,   - Resume home amlodipine     HLD  - Continue home atorvastatin 10mg and ASA     Anemia  Hgb 7.9  Recommend transfusion to keep Hgb >8  Workup and transfusion per primary service     CKD  Cr 1.89, appears to be at baseline   IV diuretics as above, closely monitor renal function    Is this a shared or split note between Advanced Practice Provider and Physician? Yes      Priti Henderson, APRN  3/5/2024  8:50 AM  Pt seen and examined independently.  Agree with above.    Progressive LE edema, abdominal bloating, and exertional dyspnea noted.  Pt has had similar Sx in the past and responded to diuretic Rx.  No chest pain of anginal form.  No palpitations.  Denies recent changes in medication.    CV:  RRR  Resp:  CTA B  Abd:  Soft.  Non-tender.  Mild distention.  +BS, NA  Ext:  2+ edema    Plan:  IV Lasix  Stop Amlodipine - potential contributor to edema  Increase ACE I  Coreg  -Titrate as hemodynamics dictate  5.    Titrate Hydralazine as BP dictates   -Hopefully wean odd as becomes euvolemic  7.      Anemia per primary Svc  8.      Echo  9.      F/U BMP / CBC            Santos Clark MD

## 2024-03-05 NOTE — ED QUICK NOTES
Orders for admission, patient is aware of plan and ready to go upstairs. Any questions, please call ED RN Mayo at extension [phone].     Patient Covid vaccination status: Unvaccinated     COVID Test Ordered in ED: None    COVID Suspicion at Admission: N/A    Running Infusions:  None    Mental Status/LOC at time of transport: Alert    Other pertinent information:   CIWA score: N/A   NIH score:  N/A

## 2024-03-06 LAB
ANION GAP SERPL CALC-SCNC: 6 MMOL/L (ref 0–18)
BASOPHILS # BLD AUTO: 0.02 X10(3) UL (ref 0–0.2)
BASOPHILS NFR BLD AUTO: 0.4 %
BUN BLD-MCNC: 28 MG/DL (ref 9–23)
CALCIUM BLD-MCNC: 8.5 MG/DL (ref 8.5–10.1)
CHLORIDE SERPL-SCNC: 98 MMOL/L (ref 98–112)
CO2 SERPL-SCNC: 27 MMOL/L (ref 21–32)
CREAT BLD-MCNC: 2.21 MG/DL
DEPRECATED HBV CORE AB SER IA-ACNC: 528.4 NG/ML
EGFRCR SERPLBLD CKD-EPI 2021: 21 ML/MIN/1.73M2 (ref 60–?)
EOSINOPHIL # BLD AUTO: 0.15 X10(3) UL (ref 0–0.7)
EOSINOPHIL NFR BLD AUTO: 2.7 %
ERYTHROCYTE [DISTWIDTH] IN BLOOD BY AUTOMATED COUNT: 12.9 %
GLUCOSE BLD-MCNC: 106 MG/DL (ref 70–99)
GLUCOSE BLD-MCNC: 175 MG/DL (ref 70–99)
GLUCOSE BLD-MCNC: 197 MG/DL (ref 70–99)
GLUCOSE BLD-MCNC: 215 MG/DL (ref 70–99)
GLUCOSE BLD-MCNC: 75 MG/DL (ref 70–99)
HCT VFR BLD AUTO: 23 %
HGB BLD-MCNC: 7.9 G/DL
IMM GRANULOCYTES # BLD AUTO: 0.02 X10(3) UL (ref 0–1)
IMM GRANULOCYTES NFR BLD: 0.4 %
IRON SATN MFR SERPL: 31 %
IRON SERPL-MCNC: 64 UG/DL
LYMPHOCYTES # BLD AUTO: 1.33 X10(3) UL (ref 1–4)
LYMPHOCYTES NFR BLD AUTO: 24.1 %
MAGNESIUM SERPL-MCNC: 1.8 MG/DL (ref 1.6–2.6)
MCH RBC QN AUTO: 33.6 PG (ref 26–34)
MCHC RBC AUTO-ENTMCNC: 34.3 G/DL (ref 31–37)
MCV RBC AUTO: 97.9 FL
MONOCYTES # BLD AUTO: 0.92 X10(3) UL (ref 0.1–1)
MONOCYTES NFR BLD AUTO: 16.7 %
NEUTROPHILS # BLD AUTO: 3.08 X10 (3) UL (ref 1.5–7.7)
NEUTROPHILS # BLD AUTO: 3.08 X10(3) UL (ref 1.5–7.7)
NEUTROPHILS NFR BLD AUTO: 55.7 %
OSMOLALITY SERPL CALC.SUM OF ELEC: 278 MOSM/KG (ref 275–295)
PLATELET # BLD AUTO: 209 10(3)UL (ref 150–450)
POTASSIUM SERPL-SCNC: 4.1 MMOL/L (ref 3.5–5.1)
POTASSIUM SERPL-SCNC: 4.1 MMOL/L (ref 3.5–5.1)
RBC # BLD AUTO: 2.35 X10(6)UL
SODIUM SERPL-SCNC: 131 MMOL/L (ref 136–145)
TIBC SERPL-MCNC: 204 UG/DL (ref 240–450)
TRANSFERRIN SERPL-MCNC: 137 MG/DL (ref 200–360)
WBC # BLD AUTO: 5.5 X10(3) UL (ref 4–11)

## 2024-03-06 PROCEDURE — 82962 GLUCOSE BLOOD TEST: CPT

## 2024-03-06 PROCEDURE — 83550 IRON BINDING TEST: CPT | Performed by: INTERNAL MEDICINE

## 2024-03-06 PROCEDURE — 84132 ASSAY OF SERUM POTASSIUM: CPT | Performed by: INTERNAL MEDICINE

## 2024-03-06 PROCEDURE — 82728 ASSAY OF FERRITIN: CPT | Performed by: INTERNAL MEDICINE

## 2024-03-06 PROCEDURE — 83540 ASSAY OF IRON: CPT | Performed by: INTERNAL MEDICINE

## 2024-03-06 PROCEDURE — 80048 BASIC METABOLIC PNL TOTAL CA: CPT | Performed by: INTERNAL MEDICINE

## 2024-03-06 PROCEDURE — 85025 COMPLETE CBC W/AUTO DIFF WBC: CPT | Performed by: INTERNAL MEDICINE

## 2024-03-06 PROCEDURE — 83735 ASSAY OF MAGNESIUM: CPT | Performed by: INTERNAL MEDICINE

## 2024-03-06 RX ORDER — GABAPENTIN 100 MG/1
100 CAPSULE ORAL NIGHTLY
Status: DISCONTINUED | OUTPATIENT
Start: 2024-03-06 | End: 2024-03-08

## 2024-03-06 RX ORDER — MAGNESIUM OXIDE 400 MG/1
400 TABLET ORAL ONCE
Status: COMPLETED | OUTPATIENT
Start: 2024-03-06 | End: 2024-03-06

## 2024-03-06 NOTE — PLAN OF CARE
Assumed care of patient at 0700; alert and oriented. Bilateral lung diminished and coarse; on room air. NSR on monitor. BLE piting edema. Patient denies chest pain, shortness of breath, dizziness. Active bowel sounds; continent of bowel with increased urinary frequency. Patient updated on plan of care for continued diuresis; questions answered. Bed in lowest position and call light within reach.   -----------------------------------------------------------

## 2024-03-06 NOTE — PAYOR COMM NOTE
--------------  3/6 CONTINUED STAY REVIEW    Payor: [employer]PO  Subscriber #:  [id number]  Authorization Number: [id number]    CARDS:    No chest pain.  No shortness of breath.  Does not appreciate and difference in edema.  No focal cardiovascular complaints reported.     Objective:  Vitals          Vitals:     03/05/24 2038 03/06/24 0003 03/06/24 0500 03/06/24 0752   BP: 122/52 140/62 143/67 (!) 169/74   BP Location: Right arm Right arm Right arm Right arm   Pulse: 73 71 72     Resp: 21 16 18 18   Temp: 98.3 °F (36.8 °C) 97.9 °F (36.6 °C) 97.7 °F (36.5 °C) 97.9 °F (36.6 °C)   TempSrc: Oral Oral Oral Oral   SpO2: 99% 97% 96%              Medications:   Scheduled:    furosemide  20 mg Intravenous BID (Diuretic)    lisinopril  40 mg Oral Daily@1600    heparin  5,000 Units Subcutaneous Q8H BRODY    aspirin  81 mg Oral Daily    atorvastatin  10 mg Oral Daily    carvedilol  12.5 mg Oral BID with meals    DULoxetine  20 mg Oral Daily    gabapentin  600 mg Oral Daily    hydrALAZINE  50 mg Oral BID    isosorbide mononitrate ER  30 mg Oral Daily    insulin degludec  15 Units Subcutaneous Daily    insulin aspart  2-10 Units Subcutaneous TID AC and HS        Physical Exam:   General:  Well-developed / Well-nourished.  No acute distress.  HEENT:  Normocephalic.  Atraumatic.  No icterus.  Neck:  There is no jugular venous distention.   Cardiovascular:  Cardiovascular examination demonstrates a regular rate and rhythm.  There is normal S1, S2.  There is no S3 or S4.  There are no murmurs, rubs, or gallops.  No click is appreciated.  PMI is nondisplaced with a normal apical impulse.    Pulmonary:  Lungs are clear to auscultation bilaterally.  There are no focal rales, rhonchi, or wheezes.  Good air movement is noted throughout both lung fields.   Abdomen:  The abdomen is soft, non-distended, and non-tender.  Bowel sounds are present and normoactive.  No organomegaly is appreciated.  Extremities:  Extremities demonstrate  2+ peripheral edema.   No cyanosis or clubbing of the digits is appreciated.  Neurologic:  Alert and oriented.  Normal affect.  Integument:  No visible rashes are appreciated.        Lab 03/02/24  1419 03/04/24  1455 03/05/24  0700 03/06/24  0630   * 196* 112* 106*   BUN 26* 25* 24* 28*   CREATSERUM 2.04* 2.08* 1.89* 2.21*   CA 8.8 8.9 8.7 8.5   ALB 3.1* 3.1*  --   --    * 130* 132* 131*   K 4.3 4.1 3.7 4.1  4.1   CL 96* 98 99 98   CO2 26.0 27.0 26.0 27.0      RBC 2.54* 2.43* 2.35*   HGB 8.4* 7.9* 7.9*   HCT 24.8* 23.8* 23.0*   MCV 97.6 97.9 97.9   MCH 33.1 32.5 33.6   MCHC 33.9 33.2 34.3   RDW 12.8 13.0 12.9   NEPRELIM 5.49 2.82 3.08   WBC 7.9 5.1 5.5   .0 222.0 209.0          Tele: SR     Echo:     1. Left ventricle: The cavity size was normal. Wall thickness was normal.      Systolic function was normal. The estimated ejection fraction was 50-55%,      by visual assessment. No diagnostic evidence for regional wall motion      abnormalities. Features are consistent with a pseudonormal left      ventricular filling pattern, with concomitant abnormal relaxation and      increased filling pressure - grade 2 diastolic dysfunction.   2. Right ventricle: The cavity size was normal. Systolic function was      normal.   3. Left atrium: The left atrial volume was mildly increased.   4. Mitral valve: There was mild regurgitation.   5. Tricuspid valve: There was mild regurgitation.   6. Pulmonary arteries: Systolic pressure was within the normal range, in the range of 25mm Hg to 30mm Hg.      Assessment:    Acute on chronic HFpEF  HTN  HL  Anemia  CRI  DM     Plan:    Continue IV Lasix BID today - remains edematous   BMP / BNP in AM   Titrate antihypertensives as hemodynamics dictate   Anemia per primary Svc   Tele monitor   Elevate LEs   Increase activity

## 2024-03-06 NOTE — PROGRESS NOTES
[facility]  Progress Note    Shi Dhillon Patient Status:  Inpatient    1935 MRN [id number]   Location [facility] 2NE-A Attending Kristofer Redmond MD   Hosp Day # 2 PCP Breonna Cabral MD     Subjective:   No chest pain.  No shortness of breath.  Does not appreciate and difference in edema.  No focal cardiovascular complaints reported.    Objective:  Vitals:    24 2038 24 0003 24 0500 24 0752   BP: 122/52 140/62 143/67 (!) 169/74   BP Location: Right arm Right arm Right arm Right arm   Pulse: 73 71 72    Resp:  18   Temp: 98.3 °F (36.8 °C) 97.9 °F (36.6 °C) 97.7 °F (36.5 °C) 97.9 °F (36.6 °C)   TempSrc: Oral Oral Oral Oral   SpO2: 99% 97% 96%    Weight:       Height:           Temp (24hrs), Av °F (36.7 °C), Min:97.7 °F (36.5 °C), Max:98.3 °F (36.8 °C)      Medications:   Scheduled:    furosemide  20 mg Intravenous BID (Diuretic)    lisinopril  40 mg Oral Daily@1600    heparin  5,000 Units Subcutaneous Q8H BRODY    aspirin  81 mg Oral Daily    atorvastatin  10 mg Oral Daily    carvedilol  12.5 mg Oral BID with meals    DULoxetine  20 mg Oral Daily    gabapentin  600 mg Oral Daily    hydrALAZINE  50 mg Oral BID    isosorbide mononitrate ER  30 mg Oral Daily    insulin degludec  15 Units Subcutaneous Daily    insulin aspart  2-10 Units Subcutaneous TID AC and HS       Continuous Infusion:       PRN Medications:   acetaminophen, melatonin, ondansetron, metoclopramide, polyethylene glycol (PEG 3350), sennosides, bisacodyl, glucose **OR** glucose **OR** glucose-vitamin C **OR** dextrose **OR** glucose **OR** glucose **OR** glucose-vitamin C    Intake/Output:     Intake/Output Summary (Last 24 hours) at 3/6/2024 0957  Last data filed at 3/6/2024 0500  Gross per 24 hour   Intake 980 ml   Output 1650 ml   Net -670 ml       Wt Readings from Last 3 Encounters:   24 131 lb 1.6 oz (59.5 kg)   24 124 lb (56.2 kg)   23 125 lb (56.7 kg)        Allergies:  Allergies   Allergen Reactions    Codeine NAUSEA ONLY     Nausea       Physical Exam:   General:  Well-developed / Well-nourished.  No acute distress.  HEENT:  Normocephalic.  Atraumatic.  No icterus.  Neck:  There is no jugular venous distention.   Cardiovascular:  Cardiovascular examination demonstrates a regular rate and rhythm.  There is normal S1, S2.  There is no S3 or S4.  There are no murmurs, rubs, or gallops.  No click is appreciated.  PMI is nondisplaced with a normal apical impulse.    Pulmonary:  Lungs are clear to auscultation bilaterally.  There are no focal rales, rhonchi, or wheezes.  Good air movement is noted throughout both lung fields.   Abdomen:  The abdomen is soft, non-distended, and non-tender.  Bowel sounds are present and normoactive.  No organomegaly is appreciated.  Extremities:  Extremities demonstrate 2+ peripheral edema.   No cyanosis or clubbing of the digits is appreciated.  Neurologic:  Alert and oriented.  Normal affect.  Integument:  No visible rashes are appreciated.      Laboratory/Data:   Recent Labs   Lab 03/02/24  1419 03/04/24  1455 03/05/24  0700 03/06/24  0630   * 196* 112* 106*   BUN 26* 25* 24* 28*   CREATSERUM 2.04* 2.08* 1.89* 2.21*   CA 8.8 8.9 8.7 8.5   ALB 3.1* 3.1*  --   --    * 130* 132* 131*   K 4.3 4.1 3.7 4.1  4.1   CL 96* 98 99 98   CO2 26.0 27.0 26.0 27.0   ALKPHO 82 84  --   --    AST 13* 16  --   --    ALT 16 13  --   --    BILT 0.4 0.5  --   --    TP 6.6 6.5  --   --        Recent Labs   Lab 03/04/24  1454 03/05/24  0700 03/06/24  0630   RBC 2.54* 2.43* 2.35*   HGB 8.4* 7.9* 7.9*   HCT 24.8* 23.8* 23.0*   MCV 97.6 97.9 97.9   MCH 33.1 32.5 33.6   MCHC 33.9 33.2 34.3   RDW 12.8 13.0 12.9   NEPRELIM 5.49 2.82 3.08   WBC 7.9 5.1 5.5   .0 222.0 209.0       No results for input(s): \"PTP\", \"INR\" in the last 168 hours.    No results for input(s): \"TROP\", \"CK\" in the last 168 hours.      Tele:     Diagnostics:    Echo:    Conclusions:     1. Left ventricle: The cavity size was normal. Wall thickness was normal.      Systolic function was normal. The estimated ejection fraction was 50-55%,      by visual assessment. No diagnostic evidence for regional wall motion      abnormalities. Features are consistent with a pseudonormal left      ventricular filling pattern, with concomitant abnormal relaxation and      increased filling pressure - grade 2 diastolic dysfunction.   2. Right ventricle: The cavity size was normal. Systolic function was      normal.   3. Left atrium: The left atrial volume was mildly increased.   4. Mitral valve: There was mild regurgitation.   5. Tricuspid valve: There was mild regurgitation.   6. Pulmonary arteries: Systolic pressure was within the normal range, in the range of 25mm Hg to 30mm Hg.     Assessment:    Acute on chronic HFpEF  HTN  HL  Anemia  CRI  DM    Plan:    Continue IV Lasix BID today - remains edematous   BMP / BNP in AM   Titrate antihypertensives as hemodynamics dictate   Anemia per primary Svc   Tele monitor   Elevate LEs   Increase activity    Santos Clark MD  3/6/2024  9:57 AM

## 2024-03-06 NOTE — PROGRESS NOTES
[facility]   part of [facility]     Hospitalist Progress Note     Shi Dhillon Patient Status:  Inpatient    1935 MRN [id number]   Location [facility] 2NE-A Attending Kristofer Redmond MD   Hosp Day # 2 PCP Breonna Cabral MD     Chief Complaint: LE edema    Subjective:     Per RN, poor appetite.  Eating 25% try.  Fatigued.      Objective:    Review of Systems:   A comprehensive review of systems was completed; pertinent positive and negatives stated in subjective.    Vital signs:  Temp:  [97.7 °F (36.5 °C)-98.3 °F (36.8 °C)] 97.8 °F (36.6 °C)  Pulse:  [71-79] 72  Resp:  [16-21] 18  BP: (122-169)/(50-74) 144/50  SpO2:  [96 %-99 %] 96 %    Physical Exam:    General: No acute distress  Respiratory: No wheezes, no rhonchi  Cardiovascular: S1, S2, regular rate and rhythm  Abdomen: Soft, Non-tender, non-distended, positive bowel sounds  Neuro: No new focal deficits.   Extremities: 2+ edema    Diagnostic Data:    Labs:  Recent Labs   Lab 24  1419 24  1454 24  0700 24  0630   WBC 8.9 7.9 5.1 5.5   HGB 9.0* 8.4* 7.9* 7.9*   MCV 96.0 97.6 97.9 97.9   .0 250.0 222.0 209.0       Recent Labs   Lab 24  1419 24  1455 24  0700 24  0630   * 196* 112* 106*   BUN 26* 25* 24* 28*   CREATSERUM 2.04* 2.08* 1.89* 2.21*   CA 8.8 8.9 8.7 8.5   ALB 3.1* 3.1*  --   --    * 130* 132* 131*   K 4.3 4.1 3.7 4.1  4.1   CL 96* 98 99 98   CO2 26.0 27.0 26.0 27.0   ALKPHO 82 84  --   --    AST 13* 16  --   --    ALT 16 13  --   --    BILT 0.4 0.5  --   --    TP 6.6 6.5  --   --        Estimated Creatinine Clearance: 12.9 mL/min (A) (based on SCr of 2.21 mg/dL (H)).    Recent Labs   Lab 24  1455   TROPHS 24       No results for input(s): \"PTP\", \"INR\" in the last 168 hours.               Microbiology    No results found for this visit on 24.      Imaging: Reviewed in Epic.    Medications:    furosemide  20 mg Intravenous BID (Diuretic)     lisinopril  40 mg Oral Daily@1600    heparin  5,000 Units Subcutaneous Q8H BRODY    aspirin  81 mg Oral Daily    atorvastatin  10 mg Oral Daily    carvedilol  12.5 mg Oral BID with meals    DULoxetine  20 mg Oral Daily    gabapentin  600 mg Oral Daily    hydrALAZINE  50 mg Oral BID    isosorbide mononitrate ER  30 mg Oral Daily    insulin degludec  15 Units Subcutaneous Daily    insulin aspart  2-10 Units Subcutaneous TID AC and HS       Assessment & Plan:      # acute on chronic HFpEF  - IV lasix, cards eval, daily weights  - ECHO EF 50-55%. Grade 2 DD     # HTN  - Amlodipine 2.5mg daily                  - hydralazine 50mg BID                  - coreg 12.5mg BID                 - lisinopril 40 mg daily     # fatigue  - reduce gabapentin given renal function  - asess for depression    # poor appetite  - dietician consulted     # DM2  - Hold oral hypoglycemics.    - Start SSI with qid accuchecks     # anemia, chrornic   - will check iron studies  - hold on prbc for now for hg < 7.0     # CKD3  - cr nearbaseline     Heparin SQ             Kristofer Redmond MD    Supplementary Documentation:     Quality:  DVT Mechanical Prophylaxis:   SCDs, Early ambuation  DVT Pharmacologic Prophylaxis   Medication    heparin (Porcine) 5000 UNIT/ML injection 5,000 Units         DVT Pharmacologic prophylaxis: Aspirin 81 mg      Code Status: Not on file  Perez: External urinary catheter in place  Perez Duration (in days):   Central line:    ABA:     Discharge is dependent on: improved edema  At this point Ms. Dhillon is expected to be discharge to: home    The 21st Century Cures Act makes medical notes like these available to patients in the interest of transparency. Please be advised this is a medical document. Medical documents are intended to carry relevant information, facts as evident, and the clinical opinion of the practitioner. The medical note is intended as peer to peer communication and may appear blunt or direct. It is  written in medical language and may contain abbreviations or verbiage that are unfamiliar.

## 2024-03-06 NOTE — PLAN OF CARE
Assumed care of pt around 1930. A&Ox4. Remains on room air with O2 saturation > 90%. Dyspneic on exertion. NSR w/ occasional PVC's on tele, denies cardiac symptoms. +3 BLE swelling. Increased frequency/stress incontinence of urine, external catheter in place. Continent of stool. Denies pain. Pt updated on plan of care and agreeable. Fall precautions in place. Call light within reach.   Plan: IV lasix BID, DW/I&O    Problem: Diabetes/Glucose Control  Goal: Glucose maintained within prescribed range  Description: INTERVENTIONS:  - Monitor Blood Glucose as ordered  - Assess for signs and symptoms of hyperglycemia and hypoglycemia  - Administer ordered medications to maintain glucose within target range  - Assess barriers to adequate nutritional intake and initiate nutrition consult as needed  - Instruct patient on self management of diabetes  Outcome: Progressing     Problem: Patient/Family Goals  Goal: Patient/Family Long Term Goal  Description: Patient's Long Term Goal: stay healthy at home    Interventions:  - medication compliance, stay active, attend follow up appts, monitor for new onset of s/s  - See additional Care Plan goals for specific interventions  Outcome: Progressing  Goal: Patient/Family Short Term Goal  Description: Patient's Short Term Goal: discharge    Interventions:   - MDs to follow  - See additional Care Plan goals for specific interventions  Outcome: Progressing

## 2024-03-06 NOTE — PROGRESS NOTES
[facility]   part of [facility]     Hospitalist Progress Note     Shi Dhillon Patient Status:  Inpatient    1935 MRN [id number]   [facility] 2NE-A Attending Kristofer Redmond MD   Hosp Day # 2 PCP Breonna Cabral MD     Chief Complaint: LE edema    Subjective:     Patient with perisistent edema, slightly improved.      Objective:    Review of Systems:   A comprehensive review of systems was completed; pertinent positive and negatives stated in subjective.    Vital signs:  Temp:  [97.7 °F (36.5 °C)-98.3 °F (36.8 °C)] 97.8 °F (36.6 °C)  Pulse:  [71-79] 72  Resp:  [16-21] 18  BP: (122-169)/(50-74) 144/50  SpO2:  [96 %-99 %] 96 %    Physical Exam:    General: No acute distress  Respiratory: No wheezes, no rhonchi  Cardiovascular: S1, S2, regular rate and rhythm  Abdomen: Soft, Non-tender, non-distended, positive bowel sounds  Neuro: No new focal deficits.   Extremities: 2+ edema    Diagnostic Data:    Labs:  Recent Labs   Lab 24  1419 24  1454 24  0700 24  0630   WBC 8.9 7.9 5.1 5.5   HGB 9.0* 8.4* 7.9* 7.9*   MCV 96.0 97.6 97.9 97.9   .0 250.0 222.0 209.0       Recent Labs   Lab 24  1419 24  1455 24  0700 24  0630   * 196* 112* 106*   BUN 26* 25* 24* 28*   CREATSERUM 2.04* 2.08* 1.89* 2.21*   CA 8.8 8.9 8.7 8.5   ALB 3.1* 3.1*  --   --    * 130* 132* 131*   K 4.3 4.1 3.7 4.1  4.1   CL 96* 98 99 98   CO2 26.0 27.0 26.0 27.0   ALKPHO 82 84  --   --    AST 13* 16  --   --    ALT 16 13  --   --    BILT 0.4 0.5  --   --    TP 6.6 6.5  --   --        Estimated Creatinine Clearance: 12.9 mL/min (A) (based on SCr of 2.21 mg/dL (H)).    Recent Labs   Lab 24  1455   TROPHS 24       No results for input(s): \"PTP\", \"INR\" in the last 168 hours.               Microbiology    No results found for this visit on 24.      Imaging: Reviewed in Epic.    Medications:    furosemide  20 mg Intravenous BID (Diuretic)     lisinopril  40 mg Oral Daily@1600    heparin  5,000 Units Subcutaneous Q8H BRODY    aspirin  81 mg Oral Daily    atorvastatin  10 mg Oral Daily    carvedilol  12.5 mg Oral BID with meals    DULoxetine  20 mg Oral Daily    gabapentin  600 mg Oral Daily    hydrALAZINE  50 mg Oral BID    isosorbide mononitrate ER  30 mg Oral Daily    insulin degludec  15 Units Subcutaneous Daily    insulin aspart  2-10 Units Subcutaneous TID AC and HS       Assessment & Plan:      # acute on chronic HFpEF  - IV lasix, cards eval, daily weights  - ECHO EF 50-55%. Grade 2 DD     # HTN  - Amlodipine 2.5mg daily                  - hydralazine 50mg BID                  - coreg 12.5mg BID                 - lisinopril 20mg daily      # DM2  - Hold oral hypoglycemics.    - Start SSI with qid accuchecks     # anemia, chrornic   - will check iron studies  - hold on prbc for now for hg < 7.0     # CKD3  - cr nearbaseline     Heparin SQ             Kristofer Redmond MD    Supplementary Documentation:     Quality:  DVT Mechanical Prophylaxis:   SCDs, Early ambuation  DVT Pharmacologic Prophylaxis   Medication    heparin (Porcine) 5000 UNIT/ML injection 5,000 Units         DVT Pharmacologic prophylaxis: Aspirin 81 mg      Code Status: Not on file  Perez: External urinary catheter in place  Perez Duration (in days):   Central line:    ABA:     Discharge is dependent on: improved edema  At this point Ms. Dhillon is expected to be discharge to: home    The 21st Century Cures Act makes medical notes like these available to patients in the interest of transparency. Please be advised this is a medical document. Medical documents are intended to carry relevant information, facts as evident, and the clinical opinion of the practitioner. The medical note is intended as peer to peer communication and may appear blunt or direct. It is written in medical language and may contain abbreviations or verbiage that are unfamiliar.

## 2024-03-07 LAB
ANION GAP SERPL CALC-SCNC: 4 MMOL/L (ref 0–18)
BUN BLD-MCNC: 29 MG/DL (ref 9–23)
CALCIUM BLD-MCNC: 8.9 MG/DL (ref 8.5–10.1)
CHLORIDE SERPL-SCNC: 95 MMOL/L (ref 98–112)
CO2 SERPL-SCNC: 31 MMOL/L (ref 21–32)
CREAT BLD-MCNC: 2.05 MG/DL
EGFRCR SERPLBLD CKD-EPI 2021: 23 ML/MIN/1.73M2 (ref 60–?)
GLUCOSE BLD-MCNC: 119 MG/DL (ref 70–99)
GLUCOSE BLD-MCNC: 125 MG/DL (ref 70–99)
GLUCOSE BLD-MCNC: 158 MG/DL (ref 70–99)
GLUCOSE BLD-MCNC: 209 MG/DL (ref 70–99)
GLUCOSE BLD-MCNC: 288 MG/DL (ref 70–99)
GLUCOSE BLD-MCNC: 75 MG/DL (ref 70–99)
MAGNESIUM SERPL-MCNC: 1.9 MG/DL (ref 1.6–2.6)
NT-PROBNP SERPL-MCNC: 4654 PG/ML (ref ?–450)
OSMOLALITY SERPL CALC.SUM OF ELEC: 277 MOSM/KG (ref 275–295)
POTASSIUM SERPL-SCNC: 4.2 MMOL/L (ref 3.5–5.1)
SODIUM SERPL-SCNC: 130 MMOL/L (ref 136–145)

## 2024-03-07 PROCEDURE — 83880 ASSAY OF NATRIURETIC PEPTIDE: CPT | Performed by: INTERNAL MEDICINE

## 2024-03-07 PROCEDURE — 83735 ASSAY OF MAGNESIUM: CPT | Performed by: INTERNAL MEDICINE

## 2024-03-07 PROCEDURE — 82962 GLUCOSE BLOOD TEST: CPT

## 2024-03-07 PROCEDURE — 80048 BASIC METABOLIC PNL TOTAL CA: CPT | Performed by: INTERNAL MEDICINE

## 2024-03-07 RX ORDER — INSULIN DEGLUDEC 100 U/ML
10 INJECTION, SOLUTION SUBCUTANEOUS DAILY
Status: DISCONTINUED | OUTPATIENT
Start: 2024-03-07 | End: 2024-03-08

## 2024-03-07 RX ORDER — RUFINAMIDE 40 MG/ML
1 SUSPENSION ORAL DAILY
Status: DISCONTINUED | OUTPATIENT
Start: 2024-03-08 | End: 2024-03-08

## 2024-03-07 NOTE — PROGRESS NOTES
[facility]   part of [facility]     Hospitalist Progress Note     Shi Dhillon Patient Status:  Inpatient    1935 MRN [id number]   Location [facility] 2NE-A Attending Kristofer Redmond MD   Hosp Day # 3 PCP Breonna Cabral MD     Chief Complaint: LE edema    Subjective:     Swelling down.      Objective:    Review of Systems:   A comprehensive review of systems was completed; pertinent positive and negatives stated in subjective.    Vital signs:  Temp:  [97.8 °F (36.6 °C)-98.2 °F (36.8 °C)] 98.2 °F (36.8 °C)  Pulse:  [67-76] 73  Resp:  [15-19] 19  BP: (106-194)/(45-82) 106/45  SpO2:  [95 %-97 %] 95 %    Physical Exam:    General: No acute distress  Respiratory: No wheezes, no rhonchi  Cardiovascular: S1, S2, regular rate and rhythm  Abdomen: Soft, Non-tender, non-distended, positive bowel sounds  Neuro: No new focal deficits.   Extremities: 2+ edema    Diagnostic Data:    Labs:  Recent Labs   Lab 24  1419 24  1454 24  0700 24  0630   WBC 8.9 7.9 5.1 5.5   HGB 9.0* 8.4* 7.9* 7.9*   MCV 96.0 97.6 97.9 97.9   .0 250.0 222.0 209.0       Recent Labs   Lab 24  1419 24  1455 24  0700 24  0630 24  0625   * 196* 112* 106* 125*   BUN 26* 25* 24* 28* 29*   CREATSERUM 2.04* 2.08* 1.89* 2.21* 2.05*   CA 8.8 8.9 8.7 8.5 8.9   ALB 3.1* 3.1*  --   --   --    * 130* 132* 131* 130*   K 4.3 4.1 3.7 4.1  4.1 4.2   CL 96* 98 99 98 95*   CO2 26.0 27.0 26.0 27.0 31.0   ALKPHO 82 84  --   --   --    AST 13* 16  --   --   --    ALT 16 13  --   --   --    BILT 0.4 0.5  --   --   --    TP 6.6 6.5  --   --   --        Estimated Creatinine Clearance: 13.9 mL/min (A) (based on SCr of 2.05 mg/dL (H)).    Recent Labs   Lab 24  1455   TROPHS 24       No results for input(s): \"PTP\", \"INR\" in the last 168 hours.               Microbiology    No results found for this visit on 24.      Imaging: Reviewed in Epic.    Medications:     insulin degludec  10 Units Subcutaneous Daily    [START ON 3/8/2024] multivitamin  1 tablet Oral Daily    gabapentin  100 mg Oral Nightly    furosemide  20 mg Intravenous BID (Diuretic)    lisinopril  40 mg Oral Daily@1600    heparin  5,000 Units Subcutaneous Q8H BRODY    aspirin  81 mg Oral Daily    atorvastatin  10 mg Oral Daily    carvedilol  12.5 mg Oral BID with meals    DULoxetine  20 mg Oral Daily    hydrALAZINE  50 mg Oral BID    isosorbide mononitrate ER  30 mg Oral Daily    insulin aspart  2-10 Units Subcutaneous TID AC and HS       Assessment & Plan:      # acute on chronic HFpEF  - IV lasix, cards eval, daily weights  - ECHO EF 50-55%. Grade 2 DD     # HTN  - Amlodipine 2.5mg daily                  - hydralazine 50mg BID                  - coreg 12.5mg BID                 - lisinopril 40 mg daily     # fatigue  - reduce gabapentin given renal function  - asess for depression    # poor appetite  - dietician consulted     # DM2  - Hold oral hypoglycemics.    - Start SSI with qid accuchecks     # anemia, chrornic   - will check iron studies  - hold on prbc for now for hg < 7.0     # CKD3  - cr nearbaseline     Heparin SQ             Kristofer Redmond MD    Supplementary Documentation:     Quality:  DVT Mechanical Prophylaxis:   SCDs, Early ambuation  DVT Pharmacologic Prophylaxis   Medication    heparin (Porcine) 5000 UNIT/ML injection 5,000 Units         DVT Pharmacologic prophylaxis: Aspirin 81 mg      Code Status: Not on file  Perez: External urinary catheter in place  Perez Duration (in days):   Central line:    ABA: 3/7/2024    Discharge is dependent on: improved edema  At this point Ms. Dhillon is expected to be discharge to: home    The 21st Century Cures Act makes medical notes like these available to patients in the interest of transparency. Please be advised this is a medical document. Medical documents are intended to carry relevant information, facts as evident, and the clinical opinion of the  practitioner. The medical note is intended as peer to peer communication and may appear blunt or direct. It is written in medical language and may contain abbreviations or verbiage that are unfamiliar.

## 2024-03-07 NOTE — CM/SW NOTE
Met with patient and daughter @ bedside to discuss discharge planning.  Spoke with SUKHI Recinos @ [facility] (phone ) who confirmed patient resides @ this facility in the assisted living area.  In house [facility] is [facility] main # [phone]  fax  [phone].  Explained to pt and daughter---SUKHI Recinos to do onsite visit tomorrow between 09 and 10am--updated her of therapy recommendations--to fax along with current MAR.  Explained that SUKHI visit part of re entry to A/L    Order to resume PT and OT with [facility] faxed with clinical updates

## 2024-03-07 NOTE — PROGRESS NOTES
[facility]  Progress Note    Shi Dhillon Patient Status:  Inpatient    1935 MRN [id number]   Location [facility] 2NE-A Attending Kristofer Redmond MD   Hosp Day # 3 PCP Breonna Cabral MD     Subjective:   No chest pain.  No shortness of breath.  Edema improving.  No new focal cardiovascular complaints reported.    Objective:  Vitals:    24 0605 24 0723 24 0958 24 1100   BP:  (!) 167/64 (!) 194/76 106/45   BP Location:  Left arm Right arm Right arm   Pulse:  67 76 73   Resp:  17 15 19   Temp:  98.2 °F (36.8 °C)  98.2 °F (36.8 °C)   TempSrc:  Oral  Oral   SpO2:  97% 96% 95%   Weight: 119 lb 9.6 oz (54.3 kg)      Height:           Temp (24hrs), Av °F (36.7 °C), Min:97.8 °F (36.6 °C), Max:98.2 °F (36.8 °C)      Medications:   Scheduled:    insulin degludec  10 Units Subcutaneous Daily    [START ON 3/8/2024] multivitamin  1 tablet Oral Daily    gabapentin  100 mg Oral Nightly    furosemide  20 mg Intravenous BID (Diuretic)    lisinopril  40 mg Oral Daily@1600    heparin  5,000 Units Subcutaneous Q8H BRODY    aspirin  81 mg Oral Daily    atorvastatin  10 mg Oral Daily    carvedilol  12.5 mg Oral BID with meals    DULoxetine  20 mg Oral Daily    hydrALAZINE  50 mg Oral BID    isosorbide mononitrate ER  30 mg Oral Daily    insulin aspart  2-10 Units Subcutaneous TID AC and HS       Continuous Infusion:       PRN Medications:   acetaminophen, melatonin, ondansetron, metoclopramide, polyethylene glycol (PEG 3350), sennosides, bisacodyl, glucose **OR** glucose **OR** glucose-vitamin C **OR** dextrose **OR** glucose **OR** glucose **OR** glucose-vitamin C    Intake/Output:     Intake/Output Summary (Last 24 hours) at 3/7/2024 1629  Last data filed at 3/7/2024 1121  Gross per 24 hour   Intake 240 ml   Output 3000 ml   Net -2760 ml       Wt Readings from Last 3 Encounters:   24 119 lb 9.6 oz (54.3 kg)   24 124 lb (56.2 kg)   23 125 lb (56.7 kg)        Allergies:  Allergies   Allergen Reactions    Codeine NAUSEA ONLY     Nausea       Physical Exam:   General:  Well-developed / Well-nourished.  No acute distress.  HEENT:  Normocephalic.  Atraumatic.  No icterus.  Neck:  There is no jugular venous distention.   Cardiovascular:  Cardiovascular examination demonstrates a regular rate and rhythm.  There is normal S1, S2.  There is no S3 or S4.  There are no murmurs, rubs, or gallops.  No click is appreciated.  PMI is nondisplaced with a normal apical impulse.    Pulmonary:  Lungs are clear to auscultation bilaterally.  There are no focal rales, rhonchi, or wheezes.  Good air movement is noted throughout both lung fields.   Abdomen:  The abdomen is soft, non-distended, and non-tender.  Bowel sounds are present and normoactive.  No organomegaly is appreciated.  Extremities:  Extremities demonstrate 1+ peripheral edema.   No cyanosis or clubbing of the digits is appreciated.  Neurologic:  Alert and oriented.  Normal affect.  Integument:  No visible rashes are appreciated.      Laboratory/Data:   Recent Labs   Lab 03/02/24  1419 03/04/24  1455 03/05/24  0700 03/06/24  0630 03/07/24  0625   * 196* 112* 106* 125*   BUN 26* 25* 24* 28* 29*   CREATSERUM 2.04* 2.08* 1.89* 2.21* 2.05*   CA 8.8 8.9 8.7 8.5 8.9   ALB 3.1* 3.1*  --   --   --    * 130* 132* 131* 130*   K 4.3 4.1 3.7 4.1  4.1 4.2   CL 96* 98 99 98 95*   CO2 26.0 27.0 26.0 27.0 31.0   ALKPHO 82 84  --   --   --    AST 13* 16  --   --   --    ALT 16 13  --   --   --    BILT 0.4 0.5  --   --   --    TP 6.6 6.5  --   --   --        Recent Labs   Lab 03/04/24  1454 03/05/24  0700 03/06/24  0630   RBC 2.54* 2.43* 2.35*   HGB 8.4* 7.9* 7.9*   HCT 24.8* 23.8* 23.0*   MCV 97.6 97.9 97.9   MCH 33.1 32.5 33.6   MCHC 33.9 33.2 34.3   RDW 12.8 13.0 12.9   NEPRELIM 5.49 2.82 3.08   WBC 7.9 5.1 5.5   .0 222.0 209.0       No results for input(s): \"PTP\", \"INR\" in the last 168 hours.    No results for  input(s): \"TROP\", \"CK\" in the last 168 hours.      Tele: SR    Diagnostics:    Echo:   Conclusions:     1. Left ventricle: The cavity size was normal. Wall thickness was normal.      Systolic function was normal. The estimated ejection fraction was 50-55%,      by visual assessment. No diagnostic evidence for regional wall motion      abnormalities. Features are consistent with a pseudonormal left      ventricular filling pattern, with concomitant abnormal relaxation and      increased filling pressure - grade 2 diastolic dysfunction.   2. Right ventricle: The cavity size was normal. Systolic function was      normal.   3. Left atrium: The left atrial volume was mildly increased.   4. Mitral valve: There was mild regurgitation.   5. Tricuspid valve: There was mild regurgitation.   6. Pulmonary arteries: Systolic pressure was within the normal range, in the range of 25mm Hg to 30mm Hg.     Assessment:    Acute on chronic HFpEF  HTN  HL  Anemia  CRI - stable  DM    Plan:    Continue IV Lasix BID today   BMP in AM   Titrate antihypertensives as hemodynamics dictate   Anemia per primary Svc   Tele monitor   Elevate LEs   Increase activity as able    Santos Clark MD  3/7/2024

## 2024-03-07 NOTE — PROGRESS NOTES
Rec'd pt at 0730. A&O x 4. Tele shows NSR. O2 sats adequate on RA. Pt continent, up w/ 1 and walker, purewick in place. No C/O pain or SOB. Skin dry and intact, redness noted on sacrum, mepilex in place. BLE +1 pitting edema present. Bed locked and in low position, call light and personal items within reach. Will continue to monitor. POC - IV lasix BID, DW, I/O's.    1030 - Report given to Traci WHYTE.

## 2024-03-07 NOTE — PLAN OF CARE
Received patient at 1000. Patient alert and oriented x4 - tremors noted.  Tele Rhythm NSR. O2 sats on RA. Patient voiding purwick.  Patient tolerating ambulation with 1 assist and walker. Skin dry - redness on bottom - mepilex on..Reviewed plan of care with patient and verbalized understanding.     POC: daughter at bedside , updated on POC. Cards following - IV lasix, daily weight.       Problem: Diabetes/Glucose Control  Goal: Glucose maintained within prescribed range  Description: INTERVENTIONS:  - Monitor Blood Glucose as ordered  - Assess for signs and symptoms of hyperglycemia and hypoglycemia  - Administer ordered medications to maintain glucose within target range  - Assess barriers to adequate nutritional intake and initiate nutrition consult as needed  - Instruct patient on self management of diabetes  Outcome: Progressing     Problem: Patient/Family Goals  Goal: Patient/Family Long Term Goal  Description: Patient's Long Term Goal: stay healthy at home    Interventions:  - medication compliance, stay active, attend follow up appts, monitor for new onset of s/s  - See additional Care Plan goals for specific interventions  Outcome: Progressing  Goal: Patient/Family Short Term Goal  Description: Patient's Short Term Goal: discharge    Interventions:   - MDs to follow  - See additional Care Plan goals for specific interventions  Outcome: Progressing     Problem: CARDIOVASCULAR - ADULT  Goal: Maintains optimal cardiac output and hemodynamic stability  Description: INTERVENTIONS:  - Monitor vital signs, rhythm, and trends  - Monitor for bleeding, hypotension and signs of decreased cardiac output  - Evaluate effectiveness of vasoactive medications to optimize hemodynamic stability  - Monitor arterial and/or venous puncture sites for bleeding and/or hematoma  - Assess quality of pulses, skin color and temperature  - Assess for signs of decreased coronary artery perfusion - ex. Angina  - Evaluate fluid balance,  assess for edema, trend weights  Outcome: Progressing  Goal: Absence of cardiac arrhythmias or at baseline  Description: INTERVENTIONS:  - Continuous cardiac monitoring, monitor vital signs, obtain 12 lead EKG if indicated  - Evaluate effectiveness of antiarrhythmic and heart rate control medications as ordered  - Initiate emergency measures for life threatening arrhythmias  - Monitor electrolytes and administer replacement therapy as ordered  Outcome: Progressing

## 2024-03-07 NOTE — DISCHARGE INSTRUCTIONS
Resume [facility] @ discharge  Fax  [phone]    Fluid intake between 1. 5 - 2 L per day.          Going Home Instructions    In this section you will find the tools which will guide you through the first few days after you leave the hospital. Continued use of these tools will help you develop the skills necessary to keep your heart failure under control.       Home Care Instructions Following Heart Failure - the most important things to do every day include:     Weigh yourself  Take your medicines as prescribed  Limit your sodium (salt) and fluid intake  Know when to call your cardiologist, primary doctor, or nurse  Know when to seek emergency care    Things for You to Remember:   1. See your doctor or healthcare provider.  It is important that you attend this appointment to make sure your symptoms are under control.     2. Your recommended sodium intake is 5757-5699 mg daily    3. Limit your fluid intake to no more than 2 liters or 64 ounces per day    4. Some exercise and activity is important to help keep your heart functioning and strong. Unless instructed not to exercise, you may walk at a slow to moderate pace for 10-15 minutes 2-3 days per week to start. Pace your activity to prevent shortness of breath or fatigue. Stop exercise if you develop chest pain, lightheadedness, or significant shortness of breath.       Call Your Cardiologist If:   You gain 2 pounds overnight or 3-4 pounds in 3-5 days  You have more difficulty breathing  You are getting more tired with normal activity  You are more short of breath lying down, or awaken at night short of breath  You have swelling of your feet or legs  You urinate less often during the day and more often at night  You have cramps in your legs  You have blurred vision or see yellowish-green halos around objects of lights    Go to the Emergency Room If:   You have pain or tightness in your chest  You are extremely short of breath  You are coughing up pink-frothy  mucus  You are traveling and develop symptoms of worsening heart failure

## 2024-03-07 NOTE — H&P
[facility]   part of [facility]     Hospitalist Progress Note     Shi Dhillon Patient Status:  Inpatient    1935 MRN [id number]   Location [facility] 2NE-A Attending Kristofer Redmond MD   Hosp Day # 3 PCP Breonna Cabral MD     Chief Complaint: LE edema    Subjective:   Swelling down.  More awake today.  Son at bedside.      Objective:    Review of Systems:   A comprehensive review of systems was completed; pertinent positive and negatives stated in subjective.    Vital signs:  Temp:  [97.8 °F (36.6 °C)-98.2 °F (36.8 °C)] 98.2 °F (36.8 °C)  Pulse:  [67-76] 73  Resp:  [15-19] 19  BP: (106-194)/(45-82) 106/45  SpO2:  [95 %-97 %] 95 %    Physical Exam:    General: No acute distress  Respiratory: No wheezes, no rhonchi  Cardiovascular: S1, S2, regular rate and rhythm  Abdomen: Soft, Non-tender, non-distended, positive bowel sounds  Neuro: No new focal deficits.   Extremities: 2+ edema    Diagnostic Data:    Labs:  Recent Labs   Lab 24  1419 24  1454 24  0700 24  0630   WBC 8.9 7.9 5.1 5.5   HGB 9.0* 8.4* 7.9* 7.9*   MCV 96.0 97.6 97.9 97.9   .0 250.0 222.0 209.0       Recent Labs   Lab 24  1419 24  1455 24  0700 24  0630 24  0625   * 196* 112* 106* 125*   BUN 26* 25* 24* 28* 29*   CREATSERUM 2.04* 2.08* 1.89* 2.21* 2.05*   CA 8.8 8.9 8.7 8.5 8.9   ALB 3.1* 3.1*  --   --   --    * 130* 132* 131* 130*   K 4.3 4.1 3.7 4.1  4.1 4.2   CL 96* 98 99 98 95*   CO2 26.0 27.0 26.0 27.0 31.0   ALKPHO 82 84  --   --   --    AST 13* 16  --   --   --    ALT 16 13  --   --   --    BILT 0.4 0.5  --   --   --    TP 6.6 6.5  --   --   --        Estimated Creatinine Clearance: 13.9 mL/min (A) (based on SCr of 2.05 mg/dL (H)).    Recent Labs   Lab 24  1455   TROPHS 24       Medications:    insulin degludec  10 Units Subcutaneous Daily    [START ON 3/8/2024] multivitamin  1 tablet Oral Daily    gabapentin  100 mg Oral Nightly     furosemide  20 mg Intravenous BID (Diuretic)    lisinopril  40 mg Oral Daily@1600    heparin  5,000 Units Subcutaneous Q8H BRODY    aspirin  81 mg Oral Daily    atorvastatin  10 mg Oral Daily    carvedilol  12.5 mg Oral BID with meals    DULoxetine  20 mg Oral Daily    hydrALAZINE  50 mg Oral BID    isosorbide mononitrate ER  30 mg Oral Daily    insulin aspart  2-10 Units Subcutaneous TID AC and HS       Assessment & Plan:      # acute on chronic HFpEF  - IV lasix, cards eval, daily weights  - ECHO EF 50-55%. Grade 2 DD     # HTN  - Amlodipine 2.5mg daily                  - hydralazine 50mg BID                  - coreg 12.5mg BID                 - lisinopril 40 mg daily     # fatigue  - reduce gabapentin given renal function  - asess for depression    # poor appetite  - dietician consulted     # DM2  - Hold oral hypoglycemics.    - Start SSI with qid accuchecks     # anemia, chrornic   - will check iron studies  - hold on prbc for now for hg < 7.0     # CKD3  - cr nearbaseline     Heparin SQ             Kristofer Redmond MD    Supplementary Documentation:     Quality:  DVT Mechanical Prophylaxis:   SCDs, Early ambuation  DVT Pharmacologic Prophylaxis   Medication    heparin (Porcine) 5000 UNIT/ML injection 5,000 Units         DVT Pharmacologic prophylaxis: Aspirin 81 mg      Code Status: Not on file  Perez: External urinary catheter in place  Perez Duration (in days):   Central line:    ABA: 3/7/2024    Discharge is dependent on: improved edema  At this point Ms. Dhillon is expected to be discharge to: home    The 21st Century Cures Act makes medical notes like these available to patients in the interest of transparency. Please be advised this is a medical document. Medical documents are intended to carry relevant information, facts as evident, and the clinical opinion of the practitioner. The medical note is intended as peer to peer communication and may appear blunt or direct. It is written in medical language  and may contain abbreviations or verbiage that are unfamiliar.

## 2024-03-07 NOTE — PLAN OF CARE
Patient AOX4. O2 sat > 90% on room air. NSR on tele. VSS. HTN this evening with BP of 170/82, down to 152/60 with nightly scheduled hydralazine. Continuing IV Lasix BID. Strict intake and output, daily weights. Cardiology following. Patient updated on plan of care.     Problem: Diabetes/Glucose Control  Goal: Glucose maintained within prescribed range  Description: INTERVENTIONS:  - Monitor Blood Glucose as ordered  - Assess for signs and symptoms of hyperglycemia and hypoglycemia  - Administer ordered medications to maintain glucose within target range  - Assess barriers to adequate nutritional intake and initiate nutrition consult as needed  - Instruct patient on self management of diabetes  Outcome: Progressing     Problem: Patient/Family Goals  Goal: Patient/Family Long Term Goal  Description: Patient's Long Term Goal: stay healthy at home    Interventions:  - medication compliance, stay active, attend follow up appts, monitor for new onset of s/s  - See additional Care Plan goals for specific interventions  Outcome: Progressing  Goal: Patient/Family Short Term Goal  Description: Patient's Short Term Goal: discharge    Interventions:   - MDs to follow  - See additional Care Plan goals for specific interventions  Outcome: Progressing     Problem: CARDIOVASCULAR - ADULT  Goal: Maintains optimal cardiac output and hemodynamic stability  Description: INTERVENTIONS:  - Monitor vital signs, rhythm, and trends  - Monitor for bleeding, hypotension and signs of decreased cardiac output  - Evaluate effectiveness of vasoactive medications to optimize hemodynamic stability  - Monitor arterial and/or venous puncture sites for bleeding and/or hematoma  - Assess quality of pulses, skin color and temperature  - Assess for signs of decreased coronary artery perfusion - ex. Angina  - Evaluate fluid balance, assess for edema, trend weights  Outcome: Progressing  Goal: Absence of cardiac arrhythmias or at baseline  Description:  INTERVENTIONS:  - Continuous cardiac monitoring, monitor vital signs, obtain 12 lead EKG if indicated  - Evaluate effectiveness of antiarrhythmic and heart rate control medications as ordered  - Initiate emergency measures for life threatening arrhythmias  - Monitor electrolytes and administer replacement therapy as ordered  Outcome: Progressing

## 2024-03-07 NOTE — DIETARY NOTE
[facility]   part of [facility]    NUTRITION ASSESSMENT    Pt meets moderate malnutrition criteria at this time.    CRITERIA FOR MALNUTRITION DIAGNOSIS:  Criteria for non-severe malnutrition diagnosis: chronic illness related to energy intake less than75% for greater than 1 month and  moderate temporal and buccal wasting      NUTRITION INTERVENTION:    RD nutrition Care Plan- See RD nutrition assessment for additional recommendations  Meal and Snacks - Monitor and encourage adequate PO intake.   Medical Food Supplements - Chocolate Ensure Plus High Protein TID. Rationale/use for oral supplements discussed.  Vitamin and Mineral Supplements - adding Multivitamin with minerals    PATIENT STATUS: 03/07/24 88 year old female admitted w/ acute on chronic congestive heart failure. PMH includes Dm2, HFpEF. Pt seen for dietary consult for poor nutrition. Pt lying in bed at time of visit w/ daughter at bedside. Pt reports poor appetite since she broke her hip in July 2023. Pt eating 50% of meals per RN documentation. Pt agreeable to trying Chocolate Ensure Plus HP TID to maximize nutrition. Noted pt w/ abdominal distention. Pt reports feeling slightly nauseous. Last BM 3/4. Noted moderate temporal and buccal wasting. No questions at this time. Will continue to monitor       ANTHROPOMETRICS:  Ht: 152.4 cm (5')  Wt: 54.3 kg (119 lb 9.6 oz).   BMI: Body mass index is 23.36 kg/m².  IBW: 45.5 kg      WEIGHT HISTORY:   Weight loss: Per EMR hx pt has lost 5 lb. (4%) in 5 days? Question accuracy of scale. Recommend re weighing     Wt Readings from Last 10 Encounters:   03/07/24 54.3 kg (119 lb 9.6 oz)   03/02/24 56.2 kg (124 lb)   03/22/23 56.7 kg (125 lb)   02/14/23 55.8 kg (123 lb)   12/21/22 55.8 kg (123 lb)   11/16/22 55.3 kg (122 lb)   11/02/22 55.3 kg (122 lb)   09/23/20 54.9 kg (121 lb)   08/19/20 54.9 kg (121 lb)   07/22/20 58.5 kg (129 lb)        NUTRITION:  Diet:       Procedures    Cardiac diet Cardiac; Calorie  Restriction/Carb Controlled: 1800 kcal/60 grams; Is Patient on Accuchecks? Yes      Food Allergies: No  Cultural/Ethnic/Scientology Preferences Addressed: Yes    Percent Meals Eaten (last 3 days)       Date/Time Percent Meals Eaten (%)    03/05/24 0900 33 %    03/05/24 1240 50 %    03/05/24 2030 50 %            GI system review:  distention  Last BM: 3/4  Skin and wounds: intact     NUTRITION RELATED PHYSICAL FINDINGS:     1. Body Fat/Muscle Mass: moderate depletion body fat Buccal fat pad and moderate muscle depletion Temple region     2. Fluid Accumulation:  +3 RLE/LLE edema      NUTRITION PRESCRIPTION: 54.3 kg  Calories: 6245-3166 calories/day (25-30 kcal/kg)  Protein: 44-65 grams protein/day ( 0.8-1.2  grams protein per kg)  Fluid: ~1 ml/kcal or per MD discretion    NUTRITION DIAGNOSIS/PROBLEM:  Malnutrition related to physiological causes as evidenced by documented/reported insufficient oral intake and moderate fat and muscle wasting.      MONITOR AND EVALUATE/NUTRITION GOALS:  PO intake of 75% of meals TID - New  PO intake of 75% of oral nutrition supplement/s - New  Weight stable within 1 to 2 lbs during admission - New      MEDICATIONS:  Lasix, Insulin    LABS:  Glucose:125, Na:130    Pt is at High nutrition risk    Christo Zarco  Dietetic intern

## 2024-03-08 VITALS
TEMPERATURE: 98 F | BODY MASS INDEX: 23.68 KG/M2 | OXYGEN SATURATION: 95 % | RESPIRATION RATE: 18 BRPM | HEART RATE: 63 BPM | DIASTOLIC BLOOD PRESSURE: 54 MMHG | HEIGHT: 60 IN | SYSTOLIC BLOOD PRESSURE: 145 MMHG | WEIGHT: 120.63 LBS

## 2024-03-08 LAB
ANION GAP SERPL CALC-SCNC: 3 MMOL/L (ref 0–18)
BUN BLD-MCNC: 34 MG/DL (ref 9–23)
CALCIUM BLD-MCNC: 8.9 MG/DL (ref 8.5–10.1)
CHLORIDE SERPL-SCNC: 94 MMOL/L (ref 98–112)
CO2 SERPL-SCNC: 32 MMOL/L (ref 21–32)
CREAT BLD-MCNC: 2.17 MG/DL
EGFRCR SERPLBLD CKD-EPI 2021: 22 ML/MIN/1.73M2 (ref 60–?)
GLUCOSE BLD-MCNC: 100 MG/DL (ref 70–99)
GLUCOSE BLD-MCNC: 98 MG/DL (ref 70–99)
OSMOLALITY SERPL CALC.SUM OF ELEC: 276 MOSM/KG (ref 275–295)
POTASSIUM SERPL-SCNC: 3.5 MMOL/L (ref 3.5–5.1)
SODIUM SERPL-SCNC: 129 MMOL/L (ref 136–145)

## 2024-03-08 PROCEDURE — 80048 BASIC METABOLIC PNL TOTAL CA: CPT | Performed by: INTERNAL MEDICINE

## 2024-03-08 PROCEDURE — 82962 GLUCOSE BLOOD TEST: CPT

## 2024-03-08 RX ORDER — INSULIN DETEMIR 100 [IU]/ML
15 INJECTION, SOLUTION SUBCUTANEOUS DAILY
Qty: 3 ML | Refills: 0 | Status: SHIPPED | OUTPATIENT
Start: 2024-03-08 | End: 2024-03-08

## 2024-03-08 RX ORDER — GABAPENTIN 100 MG/1
100 CAPSULE ORAL NIGHTLY
Qty: 30 CAPSULE | Refills: 0 | Status: SHIPPED | OUTPATIENT
Start: 2024-03-08

## 2024-03-08 RX ORDER — FUROSEMIDE 40 MG/1
40 TABLET ORAL DAILY
Qty: 30 TABLET | Refills: 0 | Status: SHIPPED | OUTPATIENT
Start: 2024-03-08

## 2024-03-08 RX ORDER — FUROSEMIDE 40 MG/1
40 TABLET ORAL DAILY
Status: DISCONTINUED | OUTPATIENT
Start: 2024-03-08 | End: 2024-03-08

## 2024-03-08 RX ORDER — POTASSIUM CHLORIDE 20 MEQ/1
40 TABLET, EXTENDED RELEASE ORAL ONCE
Status: COMPLETED | OUTPATIENT
Start: 2024-03-08 | End: 2024-03-08

## 2024-03-08 NOTE — PLAN OF CARE
Rec'd pt at 0730. A&O x 4. Mild tremors present. Tele shows NSR. O2 sats adequate on RA. Pt continent, up w/ 1 and walker. No C/O pain or SOB. Sacrum red, mepilex on. Bed locked and in low position, call light and personal items within reach. Will continue to monitor. POC - Home today on PO lasix, back to [facility].    Problem: Diabetes/Glucose Control  Goal: Glucose maintained within prescribed range  Description: INTERVENTIONS:  - Monitor Blood Glucose as ordered  - Assess for signs and symptoms of hyperglycemia and hypoglycemia  - Administer ordered medications to maintain glucose within target range  - Assess barriers to adequate nutritional intake and initiate nutrition consult as needed  - Instruct patient on self management of diabetes  Outcome: Progressing     Problem: Patient/Family Goals  Goal: Patient/Family Long Term Goal  Description: Patient's Long Term Goal: stay healthy at home    Interventions:  - medication compliance, stay active, attend follow up appts, monitor for new onset of s/s  - See additional Care Plan goals for specific interventions  Outcome: Progressing  Goal: Patient/Family Short Term Goal  Description: Patient's Short Term Goal: discharge    Interventions:   - MDs to follow  - See additional Care Plan goals for specific interventions  Outcome: Progressing     Problem: CARDIOVASCULAR - ADULT  Goal: Maintains optimal cardiac output and hemodynamic stability  Description: INTERVENTIONS:  - Monitor vital signs, rhythm, and trends  - Monitor for bleeding, hypotension and signs of decreased cardiac output  - Evaluate effectiveness of vasoactive medications to optimize hemodynamic stability  - Monitor arterial and/or venous puncture sites for bleeding and/or hematoma  - Assess quality of pulses, skin color and temperature  - Assess for signs of decreased coronary artery perfusion - ex. Angina  - Evaluate fluid balance, assess for edema, trend weights  Outcome: Progressing  Goal: Absence of  cardiac arrhythmias or at baseline  Description: INTERVENTIONS:  - Continuous cardiac monitoring, monitor vital signs, obtain 12 lead EKG if indicated  - Evaluate effectiveness of antiarrhythmic and heart rate control medications as ordered  - Initiate emergency measures for life threatening arrhythmias  - Monitor electrolytes and administer replacement therapy as ordered  Outcome: Progressing

## 2024-03-08 NOTE — PROGRESS NOTES
I received request from Dante WHYTE to provide additional HF discharge education.  I met with the patient and reviewed HF discharge instructions and provided extra emphasis on fluid restrictions, sodium restriction and signs and symptoms of heart failure.    Gay Mcgraw MSN, RN  Heart Failure Navigator

## 2024-03-08 NOTE — DISCHARGE PLANNING
Explained discharge instructions including medications and follow-up appointments to pt, verbalized understanding. IV removed. Tele monitor discontinued. Will be transported via wheelchair.

## 2024-03-08 NOTE — PLAN OF CARE
Patient AOX4. O2 sat > 90% on room air. NSR on tele. VSS. Complains of back and neck pain, relieved with PRN Tylenol. Continuing IV Lasix BID. Daily weights. Strict intake and output. Plans for return back to assisted living facility at discharge. Patient updated on plan of care.     Problem: Diabetes/Glucose Control  Goal: Glucose maintained within prescribed range  Description: INTERVENTIONS:  - Monitor Blood Glucose as ordered  - Assess for signs and symptoms of hyperglycemia and hypoglycemia  - Administer ordered medications to maintain glucose within target range  - Assess barriers to adequate nutritional intake and initiate nutrition consult as needed  - Instruct patient on self management of diabetes  Outcome: Progressing     Problem: Patient/Family Goals  Goal: Patient/Family Long Term Goal  Description: Patient's Long Term Goal: stay healthy at home    Interventions:  - medication compliance, stay active, attend follow up appts, monitor for new onset of s/s  - See additional Care Plan goals for specific interventions  Outcome: Progressing  Goal: Patient/Family Short Term Goal  Description: Patient's Short Term Goal: discharge    Interventions:   - MDs to follow  - See additional Care Plan goals for specific interventions  Outcome: Progressing     Problem: CARDIOVASCULAR - ADULT  Goal: Maintains optimal cardiac output and hemodynamic stability  Description: INTERVENTIONS:  - Monitor vital signs, rhythm, and trends  - Monitor for bleeding, hypotension and signs of decreased cardiac output  - Evaluate effectiveness of vasoactive medications to optimize hemodynamic stability  - Monitor arterial and/or venous puncture sites for bleeding and/or hematoma  - Assess quality of pulses, skin color and temperature  - Assess for signs of decreased coronary artery perfusion - ex. Angina  - Evaluate fluid balance, assess for edema, trend weights  Outcome: Progressing  Goal: Absence of cardiac arrhythmias or at  baseline  Description: INTERVENTIONS:  - Continuous cardiac monitoring, monitor vital signs, obtain 12 lead EKG if indicated  - Evaluate effectiveness of antiarrhythmic and heart rate control medications as ordered  - Initiate emergency measures for life threatening arrhythmias  - Monitor electrolytes and administer replacement therapy as ordered  Outcome: Progressing

## 2024-03-08 NOTE — DISCHARGE SUMMARY
General Medicine Discharge Summary     Patient ID:  Shi Dhillon  88 year old  12/21/1935    Admit date: 3/4/2024    Discharge date and time: 3/8/2024    Attending Physician: Kristofer Redmond MD     Primary Care Physician: Breonna Cabral MD     Discharge Diagnoses:     Acute on chronic HFpEF    HTN    HL    Anemia    CKD    Dm2, insulin dep    Primary Diagnosis at discharge from Hospital: Other: CHF; still recommend for TCM follow-up    Please note that only IHP DMG and EMG patients enrolled in the Medicare ACO, [facility] ACO and [facility] HMOs will be handled by the [facility] Care Management team.  For all other patients, please follow usual protocol for discharge care transition.    Secondary Diagnoses: None    Risk of readmission: Shi Dhillon has Moderate Risk of readmission after discharge from the hospital.    Discharge Condition: stable    Disposition: home    Important Follow up:  - PCP within   - Consults:     No follow-up provider specified.  - Labs: none  - Radiology: none    Hospital Course:        # acute on chronic HFpEF  - sp IV lasix, cards eval, daily weights  - ECHO EF 50-55%. Grade 2 DD     # HTN  - Amlodipine 2.5mg daily                  - hydralazine 50mg BID                  - coreg 12.5mg BID                 - lisinopril 40 mg daily     # fatigue  - reduce gabapentin given renal function- cont 100 mg dosing at dc  - asess for depression- fu pcp as OP     # poor appetite  - dietician consulted     # DM2  - insulin and oral reordered on dc     # anemia, chrornic   - NOT iron def  - hold on prbc for now for hg < 7.0     # CKD3  - cr nearbaseline     Heparin SQ            Consults: IP CONSULT TO HOSPITALIST  IP CONSULT TO HOSPITALIST  IP CONSULT TO CARDIOLOGY  IP CONSULT TO FOOD AND NUTRITION SERVICES  IP CONSULT TO SOCIAL WORK    Operative Procedures:        Patient instructions:      Current Discharge Medication List        START taking  these medications    Details   gabapentin 100 MG Oral Cap Take 1 capsule (100 mg total) by mouth nightly.           CONTINUE these medications which have CHANGED    Details   furosemide 40 MG Oral Tab Take 1 tablet (40 mg total) by mouth daily.      LEVEMIR FLEXTOUCH 100 UNIT/ML Subcutaneous Solution Pen-injector Inject 15 Units into the skin daily.           CONTINUE these medications which have NOT CHANGED    Details   hydrALAZINE 50 MG Oral Tab Take 1 tablet (50 mg total) by mouth in the morning and 1 tablet (50 mg total) before bedtime.      isosorbide mononitrate ER 30 MG Oral Tablet 24 Hr Take 1 tablet (30 mg total) by mouth daily.      acetaminophen 325 MG Oral Tab Take 2 tablets (650 mg total) by mouth every 6 (six) hours.      DULoxetine 60 MG Oral Cap DR Particles Take 1 capsule (60 mg total) by mouth daily.      carvedilol 12.5 MG Oral Tab Take 1 tablet (12.5 mg total) by mouth 2 (two) times daily with meals.      lisinopril 20 MG Oral Tab Take 1.5 tablets (30 mg total) by mouth daily. Pt takes daily at 1600      glimepiride 4 MG Oral Tab Take 1.5 mg by mouth daily.      atorvastatin 10 MG Oral Tab Take 1 tablet (10 mg total) by mouth daily.      aspirin 81 MG Oral Tab EC Take 1 tablet (81 mg total) by mouth daily.      PEG 3350 17 g Oral Powd Pack Take 17 g by mouth daily.           STOP taking these medications       torsemide 20 MG Oral Tab        gabapentin 600 MG Oral Tab        Ondansetron HCl (ZOFRAN) 4 mg tablet        Pantoprazole Sodium 40 MG Oral Tab EC        sennosides 8.6 MG Oral Tab        traMADol HCl 50 MG Oral Tab        Vitamin B-12 250 MCG Oral Tab        Metoprolol Tartrate 50 MG Oral Tab        digoxin 0.125 MG Oral Tab        amLODIPine Besylate 10 MG Oral Tab              I PERSONALLY RECONCILED CURRENT AND DISCHARGE MEDICATIONS ON DAY OF DISCHARGE      Activity: activity as tolerated  Diet: cardiac diet  Wound Care: as directed  Code Status: No Order      Exam on day of discharge:      Vitals:    03/08/24 0800   BP:    Pulse: 63   Resp:    Temp:        General: no acute distress, alert and oriented x 3  Heart: RRR  Lungs: clear bilaterally, no active wheezing  Abdomen: nontender, nondistended, intact BS  Extremities: no pedal edema   Neuro: CN inact, no focal deficits      Total time coordinating care for discharge: Greater than 30 minutes    .Ander Redmond  [facility] Hospitalist  [phone]

## 2024-03-08 NOTE — PROGRESS NOTES
[facility]  Progress Note    Shi Dhillon Patient Status:  Inpatient    1935 MRN [id number]   Location [facility] 2NE-A Attending Kristofer Redmond MD   Hosp Day # 4 PCP Breonna Cabral MD     Subjective:   No chest pain.  No shortness of breath.  Edema improving.  No new focal cardiovascular complaints reported.    Objective:  Vitals:    24 2218 24 0000 24 0500 24 0740   BP:  132/56 135/54 145/54   BP Location:  Right arm Right arm Right arm   Pulse: 72 78 69 75   Resp:   18   Temp:  98.1 °F (36.7 °C) 97.8 °F (36.6 °C) 98.1 °F (36.7 °C)   TempSrc:  Oral Oral Oral   SpO2:   95% 94%   Weight:   120 lb 9.6 oz (54.7 kg)    Height:           Temp (24hrs), Av °F (36.7 °C), Min:97.8 °F (36.6 °C), Max:98.2 °F (36.8 °C)      Medications:   Scheduled:    potassium chloride  40 mEq Oral Once    insulin degludec  10 Units Subcutaneous Daily    multivitamin  1 tablet Oral Daily    gabapentin  100 mg Oral Nightly    furosemide  20 mg Intravenous BID (Diuretic)    lisinopril  40 mg Oral Daily@1600    heparin  5,000 Units Subcutaneous Q8H BRODY    aspirin  81 mg Oral Daily    atorvastatin  10 mg Oral Daily    carvedilol  12.5 mg Oral BID with meals    DULoxetine  20 mg Oral Daily    hydrALAZINE  50 mg Oral BID    isosorbide mononitrate ER  30 mg Oral Daily    insulin aspart  2-10 Units Subcutaneous TID AC and HS       Continuous Infusion:       PRN Medications:   acetaminophen, melatonin, ondansetron, metoclopramide, polyethylene glycol (PEG 3350), sennosides, bisacodyl, glucose **OR** glucose **OR** glucose-vitamin C **OR** dextrose **OR** glucose **OR** glucose **OR** glucose-vitamin C    Intake/Output:     Intake/Output Summary (Last 24 hours) at 3/8/2024 0951  Last data filed at 3/8/2024 0800  Gross per 24 hour   Intake 360 ml   Output 1200 ml   Net -840 ml       Wt Readings from Last 3 Encounters:   24 120 lb 9.6 oz (54.7 kg)   24 124 lb (56.2 kg)    03/22/23 125 lb (56.7 kg)       Allergies:  Allergies   Allergen Reactions    Codeine NAUSEA ONLY     Nausea       Physical Exam:   General:  Well-developed / Well-nourished.  No acute distress.  HEENT:  Normocephalic.  Atraumatic.  No icterus.  Neck:  There is no jugular venous distention.   Cardiovascular:  Cardiovascular examination demonstrates a regular rate and rhythm.  There is normal S1, S2.  There is no S3 or S4.  There are no murmurs, rubs, or gallops.  No click is appreciated.  PMI is nondisplaced with a normal apical impulse.    Pulmonary:  Lungs are clear to auscultation bilaterally.  There are no focal rales, rhonchi, or wheezes.  Good air movement is noted throughout both lung fields.   Abdomen:  The abdomen is soft, non-distended, and non-tender.  Bowel sounds are present and normoactive.  No organomegaly is appreciated.  Extremities:  Extremities demonstrate trace-1+ peripheral edema.   No cyanosis or clubbing of the digits is appreciated.  Neurologic:  Alert and oriented.  Normal affect.  Integument:  No visible rashes are appreciated.      Laboratory/Data:   Recent Labs   Lab 03/02/24  1419 03/04/24  1455 03/05/24  0700 03/06/24  0630 03/07/24  0625 03/08/24  0646   * 196*   < > 106* 125* 98   BUN 26* 25*   < > 28* 29* 34*   CREATSERUM 2.04* 2.08*   < > 2.21* 2.05* 2.17*   CA 8.8 8.9   < > 8.5 8.9 8.9   ALB 3.1* 3.1*  --   --   --   --    * 130*   < > 131* 130* 129*   K 4.3 4.1   < > 4.1  4.1 4.2 3.5   CL 96* 98   < > 98 95* 94*   CO2 26.0 27.0   < > 27.0 31.0 32.0   ALKPHO 82 84  --   --   --   --    AST 13* 16  --   --   --   --    ALT 16 13  --   --   --   --    BILT 0.4 0.5  --   --   --   --    TP 6.6 6.5  --   --   --   --     < > = values in this interval not displayed.       Recent Labs   Lab 03/04/24  1454 03/05/24  0700 03/06/24  0630   RBC 2.54* 2.43* 2.35*   HGB 8.4* 7.9* 7.9*   HCT 24.8* 23.8* 23.0*   MCV 97.6 97.9 97.9   MCH 33.1 32.5 33.6   MCHC 33.9 33.2 34.3   RDW  12.8 13.0 12.9   NEPRELIM 5.49 2.82 3.08   WBC 7.9 5.1 5.5   .0 222.0 209.0       No results for input(s): \"PTP\", \"INR\" in the last 168 hours.    No results for input(s): \"TROP\", \"CK\" in the last 168 hours.      Tele: SR    Diagnostics:    Echo:   Conclusions:     1. Left ventricle: The cavity size was normal. Wall thickness was normal.      Systolic function was normal. The estimated ejection fraction was 50-55%,      by visual assessment. No diagnostic evidence for regional wall motion      abnormalities. Features are consistent with a pseudonormal left      ventricular filling pattern, with concomitant abnormal relaxation and      increased filling pressure - grade 2 diastolic dysfunction.   2. Right ventricle: The cavity size was normal. Systolic function was      normal.   3. Left atrium: The left atrial volume was mildly increased.   4. Mitral valve: There was mild regurgitation.   5. Tricuspid valve: There was mild regurgitation.   6. Pulmonary arteries: Systolic pressure was within the normal range, in the range of 25mm Hg to 30mm Hg.     Assessment:    Acute on chronic HFpEF  HTN  HL  Anemia  CRI - stable  DM    Plan:    Transition IV Lasix to PO   Titrate antihypertensives as hemodynamics dictate   Anemia per primary Svc   Tele monitor while hospitalized   Elevate LEs   Increase activity as able  Discharge planning    Santos Clark MD  3/8/2024

## 2024-03-11 NOTE — PAYOR COMM NOTE
--------------  DISCHARGE REVIEW    Payor: [employer] PPO  Subscriber #:  [id number]  Authorization Number: [id number]    Admit date: 3/4/24  Admit time:   8:59 PM  Discharge Date: 3/8/2024  5:19 PM                                                        General Medicine Discharge Summary     Patient ID:  Shi Dhillon  88 year old  12/21/1935    Admit date: 3/4/2024    Discharge date and time: 3/8/2024    Attending Physician: Kristofer Redmond MD     Primary Care Physician: Breonna Cabral MD     Discharge Diagnoses:     Acute on chronic HFpEF    HTN    HL    Anemia    CKD    Dm2, insulin dep    Primary Diagnosis at discharge from Hospital: Other: CHF; still recommend for TCM follow-up    Please note that only IHP DMG and EMG patients enrolled in the Medicare ACO, [facility] ACO and [facility] HMOs will be handled by [facility] team.  For all other patients, please follow usual protocol for discharge care transition.    Secondary Diagnoses: None    Risk of readmission: Shi Dhillon has Moderate Risk of readmission after discharge from the hospital.    Discharge Condition: stable    Disposition: home    Important Follow up:  - PCP within   - Consults:     No follow-up provider specified.  - Labs: none  - Radiology: none    Hospital Course:        # acute on chronic HFpEF  - sp IV lasix, cards eval, daily weights  - ECHO EF 50-55%. Grade 2 DD     # HTN  - Amlodipine 2.5mg daily                  - hydralazine 50mg BID                  - coreg 12.5mg BID                 - lisinopril 40 mg daily     # fatigue  - reduce gabapentin given renal function- cont 100 mg dosing at dc  - asess for depression- fu pcp as OP     # poor appetite  - dietician consulted     # DM2  - insulin and oral reordered on dc     # anemia, chrornic   - NOT iron def  - hold on prbc for now for hg < 7.0     # CKD3  - cr nearbaseline     Heparin SQ            Consults: IP CONSULT TO HOSPITALIST  IP CONSULT TO HOSPITALIST  IP  CONSULT TO CARDIOLOGY  IP CONSULT TO FOOD AND NUTRITION SERVICES  IP CONSULT TO SOCIAL WORK    Operative Procedures:        Patient instructions:      Current Discharge Medication List        START taking these medications    Details   gabapentin 100 MG Oral Cap Take 1 capsule (100 mg total) by mouth nightly.           CONTINUE these medications which have CHANGED    Details   furosemide 40 MG Oral Tab Take 1 tablet (40 mg total) by mouth daily.      LEVEMIR FLEXTOUCH 100 UNIT/ML Subcutaneous Solution Pen-injector Inject 15 Units into the skin daily.           CONTINUE these medications which have NOT CHANGED    Details   hydrALAZINE 50 MG Oral Tab Take 1 tablet (50 mg total) by mouth in the morning and 1 tablet (50 mg total) before bedtime.      isosorbide mononitrate ER 30 MG Oral Tablet 24 Hr Take 1 tablet (30 mg total) by mouth daily.      acetaminophen 325 MG Oral Tab Take 2 tablets (650 mg total) by mouth every 6 (six) hours.      DULoxetine 60 MG Oral Cap DR Particles Take 1 capsule (60 mg total) by mouth daily.      carvedilol 12.5 MG Oral Tab Take 1 tablet (12.5 mg total) by mouth 2 (two) times daily with meals.      lisinopril 20 MG Oral Tab Take 1.5 tablets (30 mg total) by mouth daily. Pt takes daily at 1600      glimepiride 4 MG Oral Tab Take 1.5 mg by mouth daily.      atorvastatin 10 MG Oral Tab Take 1 tablet (10 mg total) by mouth daily.      aspirin 81 MG Oral Tab EC Take 1 tablet (81 mg total) by mouth daily.      PEG 3350 17 g Oral Powd Pack Take 17 g by mouth daily.           STOP taking these medications       torsemide 20 MG Oral Tab        gabapentin 600 MG Oral Tab        Ondansetron HCl (ZOFRAN) 4 mg tablet        Pantoprazole Sodium 40 MG Oral Tab EC        sennosides 8.6 MG Oral Tab        traMADol HCl 50 MG Oral Tab        Vitamin B-12 250 MCG Oral Tab        Metoprolol Tartrate 50 MG Oral Tab        digoxin 0.125 MG Oral Tab        amLODIPine Besylate 10 MG Oral Tab              I  PERSONALLY RECONCILED CURRENT AND DISCHARGE MEDICATIONS ON DAY OF DISCHARGE      Activity: activity as tolerated  Diet: cardiac diet  Wound Care: as directed  Code Status: No Order      Exam on day of discharge:     Vitals:    03/08/24 0800   BP:    Pulse: 63   Resp:    Temp:        General: no acute distress, alert and oriented x 3  Heart: RRR  Lungs: clear bilaterally, no active wheezing  Abdomen: nontender, nondistended, intact BS  Extremities: no pedal edema   Neuro: CN inact, no focal deficits      Total time coordinating care for discharge: Greater than 30 minutes    .Ander Redmond  [facility] Hospitalist  [phone]      Electronically signed by Kristofer Redmond MD on 3/8/2024  3:39 PM         REVIEWER COMMENTS

## 2024-06-21 ENCOUNTER — TELEPHONE (OUTPATIENT)
Dept: PHYSICAL MEDICINE AND REHAB | Facility: CLINIC | Age: 89
End: 2024-06-21

## 2024-06-21 DIAGNOSIS — M25.411 EFFUSION OF JOINT OF RIGHT SHOULDER: Primary | ICD-10-CM

## 2024-06-21 NOTE — TELEPHONE ENCOUNTER
Patient EC calling to inform that patient fluid on her rotator cuffs need to be drained and have injections, she is experiencing a lot of pain and has minimum movement on  both of her arms, offer the soonest apt on the schedule for  and apt was declined, states patient needs to be seen next week. Please call to advice.

## 2024-06-24 ENCOUNTER — TELEPHONE (OUTPATIENT)
Dept: PHYSICAL MEDICINE AND REHAB | Facility: CLINIC | Age: 89
End: 2024-06-24

## 2024-06-24 NOTE — TELEPHONE ENCOUNTER
Called back daughter Jaymie and informed that patient needs an office visit for an evaluation prior to an injection as patient was last seen on 05/30/2024 in which she did had a right shoulder injection.     Jaymie stated that patient needs to have another right shoulder injection as she is not able to raise her arms.     Location of Pain: bilateral shoulder pain and fluid buildup (Right >Left)    Old or new pain: worsening slowly but worst since  a couple weeks.     Per daughter patient has never had her left shoulder injected, only the right and the right is the one bothering her the most.           Numeric Rating Scale:  Unable to rate pain as patient was not present during the phone call.     Distribution of Pain:    bilateral (right worst)  Aggravating Factors:  movement.  Unable to raise arms per daughter.   Current pain treatment: Tylenol, has tramadol however not helpful.      LOV:Visit date not found   NOV: Visit date not found     Patient in a wheelchair.     Summary of patient request: Daughter Jaymie stated that patient fractured her hip last September and is in a wheelchair. Patient also lives in an assisted living and  considering her age is very hard to go back and forth to the appointments.     Daughter is aware that patient needs to be seen first, but wondering if Dr Gtz can consider placing the order for a Right shoulder injection and aspiration with ultrasound guidance and if he considers it appropriate, patient can have it done on that appointment.     Offered earlier appointment dates, however due to daughter's schedule and transportation issues patient can do Fridays.     Scheduled patient for a follow up on 07/19/2024.   Pended an order for Right shoulder injection and aspiration with ultrasound guidance and routing to Dr Gtz for his recommendations.

## 2024-06-24 NOTE — TELEPHONE ENCOUNTER
Initiated authorization for Right shoulder injection and aspiration with ultrasound guidance CPT/HCPCS 70322,  with [employer]  Status: Approved-authorization is not required per health plan based on medical necessity however is not a guarantee of payment and may be subject to review once claim is submitted

## 2024-06-24 NOTE — TELEPHONE ENCOUNTER
S/w pt's EC, verbalized understanding and agreement for this Thursday at 11:00AM. Management to create time slot, will await to schedule officially until slot created.

## 2024-06-27 ENCOUNTER — OFFICE VISIT (OUTPATIENT)
Dept: PHYSICAL MEDICINE AND REHAB | Facility: CLINIC | Age: 89
End: 2024-06-27

## 2024-06-27 VITALS — WEIGHT: 120 LBS | BODY MASS INDEX: 23.56 KG/M2 | HEIGHT: 60 IN

## 2024-06-27 DIAGNOSIS — M19.011 PRIMARY OSTEOARTHRITIS OF RIGHT SHOULDER: ICD-10-CM

## 2024-06-27 DIAGNOSIS — M25.411 EFFUSION OF JOINT OF RIGHT SHOULDER: Primary | ICD-10-CM

## 2024-06-27 PROCEDURE — 20611 DRAIN/INJ JOINT/BURSA W/US: CPT | Performed by: PHYSICAL MEDICINE & REHABILITATION

## 2024-06-27 PROCEDURE — 3008F BODY MASS INDEX DOCD: CPT | Performed by: PHYSICAL MEDICINE & REHABILITATION

## 2024-06-27 RX ORDER — TRIAMCINOLONE ACETONIDE 40 MG/ML
80 INJECTION, SUSPENSION INTRA-ARTICULAR; INTRAMUSCULAR ONCE
Status: COMPLETED | OUTPATIENT
Start: 2024-06-27 | End: 2024-06-27

## 2024-06-27 RX ORDER — LIDOCAINE HYDROCHLORIDE 10 MG/ML
4 INJECTION, SOLUTION INFILTRATION; PERINEURAL ONCE
Status: COMPLETED | OUTPATIENT
Start: 2024-06-27 | End: 2024-06-27

## 2024-06-27 NOTE — PROCEDURES
Shoulder injection with ultrasound guidance  Location: right  After discussing benefits and possible side effects, we proceeded with a an ultrasound-guided intra-articular shoulder injection. The patient was consented.  The patient was seated, and the posterolateral shoulder was palpated.  A high-frequency linear array transducer was used for ultrasound visualization of the posterior shoulder.  There was significant irregularity of the humeral head.  The skin was sterilely prepped.  Using ultrasound a significant effusion was noted anterolaterally over the shoulder joint.  Using an 18-gauge needle under ultrasound guidance 35 cc of serosanguineous fluid was removed.  Then, A 22 -gauge 2-1/2 inch spinal needle was introduced via a posterior approach under ultrasound guidance to the glenohumeral joint just lateral to the labrum.   A total of 2 cc of 40 mg/ml Kenalog and 4 cc of 1% lidocaine was injected under direct ultrasound visualization.     Permanent images were saved. The patient tolerated the procedure well without adverse effects.

## 2024-09-17 ENCOUNTER — OFFICE VISIT (OUTPATIENT)
Dept: PHYSICAL MEDICINE AND REHAB | Facility: CLINIC | Age: 89
End: 2024-09-17
Payer: MEDICARE

## 2024-09-17 ENCOUNTER — TELEPHONE (OUTPATIENT)
Dept: PHYSICAL MEDICINE AND REHAB | Facility: CLINIC | Age: 89
End: 2024-09-17

## 2024-09-17 VITALS — WEIGHT: 120 LBS | BODY MASS INDEX: 23.56 KG/M2 | HEIGHT: 60 IN

## 2024-09-17 DIAGNOSIS — M19.011 PRIMARY OSTEOARTHRITIS OF RIGHT SHOULDER: ICD-10-CM

## 2024-09-17 DIAGNOSIS — M25.411 EFFUSION OF JOINT OF RIGHT SHOULDER: Primary | ICD-10-CM

## 2024-09-17 DIAGNOSIS — G89.4 CHRONIC PAIN SYNDROME: ICD-10-CM

## 2024-09-17 DIAGNOSIS — M19.012 PRIMARY OSTEOARTHRITIS OF SHOULDERS, BILATERAL: ICD-10-CM

## 2024-09-17 DIAGNOSIS — M19.011 PRIMARY OSTEOARTHRITIS OF SHOULDERS, BILATERAL: ICD-10-CM

## 2024-09-17 DIAGNOSIS — E11.42 DIABETIC POLYNEUROPATHY ASSOCIATED WITH TYPE 2 DIABETES MELLITUS (HCC): ICD-10-CM

## 2024-09-17 DIAGNOSIS — K92.0 GASTROINTESTINAL HEMORRHAGE WITH HEMATEMESIS: ICD-10-CM

## 2024-09-17 RX ORDER — DULOXETIN HYDROCHLORIDE 20 MG/1
CAPSULE, DELAYED RELEASE ORAL
COMMUNITY
Start: 2024-08-22

## 2024-09-17 RX ORDER — HYDRALAZINE HYDROCHLORIDE 100 MG/1
TABLET, FILM COATED ORAL
COMMUNITY
Start: 2024-08-15

## 2024-09-17 RX ORDER — MECLIZINE HYDROCHLORIDE 25 MG/1
25 TABLET ORAL AS DIRECTED
COMMUNITY
Start: 2023-07-14

## 2024-09-17 RX ORDER — LIDOCAINE HYDROCHLORIDE 10 MG/ML
8 INJECTION, SOLUTION INFILTRATION; PERINEURAL ONCE
Status: COMPLETED | OUTPATIENT
Start: 2024-09-17 | End: 2024-09-17

## 2024-09-17 RX ORDER — BUMETANIDE 2 MG/1
TABLET ORAL
COMMUNITY
Start: 2024-08-15

## 2024-09-17 RX ORDER — TRIAMCINOLONE ACETONIDE 40 MG/ML
160 INJECTION, SUSPENSION INTRA-ARTICULAR; INTRAMUSCULAR ONCE
Status: COMPLETED | OUTPATIENT
Start: 2024-09-17 | End: 2024-09-17

## 2024-09-17 RX ADMIN — LIDOCAINE HYDROCHLORIDE 8 ML: 10 INJECTION, SOLUTION INFILTRATION; PERINEURAL at 15:56:00

## 2024-09-17 RX ADMIN — TRIAMCINOLONE ACETONIDE 160 MG: 40 INJECTION, SUSPENSION INTRA-ARTICULAR; INTRAMUSCULAR at 15:56:00

## 2024-09-17 NOTE — PROGRESS NOTES
[facility]  Progress Note    CHIEF COMPLAINT:    Chief Complaint   Patient presents with    Shoulder Injury     LOV 6/27/2024 Patient c/o BL shoulder pain. Received right shoulder CSI at last appointment which provided short term relief, pain has been getting worse in both her R and L shoulder. LOP 2/10       History of Present Illness:  Shi Dhillon is a 88 year old female who presents today for follow up for symptoms of chronic recurrent bilateral shoulder pain.  She is here today requesting repeat shoulder injections.  The right shoulder still swollen.    PAST MEDICAL HISTORY:  Past Medical History:    Back pain    Diabetes (HCC)    Essential hypertension       SURGICAL HISTORY:  No past surgical history on file.    SOCIAL HISTORY:   Social History     Occupational History    Not on file   Tobacco Use    Smoking status: Never    Smokeless tobacco: Never   Vaping Use    Vaping status: Never Used   Substance and Sexual Activity    Alcohol use: Never    Drug use: Never    Sexual activity: Not on file       CURRENT MEDICATIONS:   Current Outpatient Medications   Medication Sig Dispense Refill    bumetanide 2 MG Oral Tab       DULoxetine 20 MG Oral Cap DR Particles       hydrALAZINE 100 MG Oral Tab       meclizine 25 MG Oral Tab Take 1 tablet (25 mg total) by mouth As Directed.      furosemide 40 MG Oral Tab Take 1 tablet (40 mg total) by mouth daily. 30 tablet 0    gabapentin 100 MG Oral Cap Take 1 capsule (100 mg total) by mouth nightly. 30 capsule 0    isosorbide mononitrate ER 30 MG Oral Tablet 24 Hr Take 1 tablet (30 mg total) by mouth daily.      acetaminophen 325 MG Oral Tab Take 2 tablets (650 mg total) by mouth every 6 (six) hours.      carvedilol 12.5 MG Oral Tab Take 1 tablet (12.5 mg total) by mouth 2 (two) times daily with meals.      lisinopril 20 MG Oral Tab Take 1.5 tablets (30 mg total) by mouth daily. Pt takes daily at 1600      glimepiride 4 MG  Oral Tab Take 1.5 mg by mouth daily.      atorvastatin 10 MG Oral Tab Take 1 tablet (10 mg total) by mouth daily.      aspirin 81 MG Oral Tab EC Take 1 tablet (81 mg total) by mouth daily.         ALLERGIES:   Allergies   Allergen Reactions    Codeine NAUSEA ONLY     Nausea         PHYSICAL EXAM:   Ht 60\"   Wt 120 lb (54.4 kg)   BMI 23.44 kg/m²     Body mass index is 23.44 kg/m².      General: No immediate distress  Extremities: No upper extremity edema bilaterally   Shoulders: Bilaterally limited and painful range of motion.  Relatively large effusion on the right  Neuro:   Cognition: alert & oriented x 3, attentive, comprehention intact, spontaneous speech intact  Strength:  Upper extremities have 5/5 strength          ASSESSMENT AND PLAN:  1. Effusion of joint of right shoulder  Today we aspirated and injected the right shoulder  - SPECIALTY (OTHER) - EXTERNAL  - triamcinolone acetonide (Kenalog-40) 40 MG/ML injection 160 mg  - lidocaine (Xylocaine) 1 % injection    2. Primary osteoarthritis of right shoulder  No aspiration necessary.  Injected the left shoulder today  - triamcinolone acetonide (Kenalog-40) 40 MG/ML injection 160 mg  - lidocaine (Xylocaine) 1 % injection    3. Chronic pain syndrome  And duloxetine 20    4. Diabetic polyneuropathy associated with type 2 diabetes mellitus (HCC)  Will need to watch blood sugars.  On duloxetine 20 for neuropathy    5. Gastrointestinal hemorrhage with hematemesis  No NSAIDs, limits treatment options    6. Primary osteoarthritis of shoulders, bilateral  As above  - SPECIALTY (OTHER) - EXTERNAL  - triamcinolone acetonide (Kenalog-40) 40 MG/ML injection 160 mg  - lidocaine (Xylocaine) 1 % injection        RTC as needed        The patient was in agreement with the assessment and plan.  All questions were answered.        Guy Gtz MD  Physical Medicine and Rehabilitation/Sports Medicine  [facility]

## 2024-09-17 NOTE — TELEPHONE ENCOUNTER
Initiated authorization for Right shoulder injection with ultrasound guidance and Left shoulder injection. CPT/HCPCS 02261-05,  x's 2 dx:M19.011/012 with [employer]  Completed in the office today    Status: Approved -Authorization is not required based on medical necessity when being performed, however is not a guarantee of payment and may be subject to review once claim is submitted-Covered Benefit

## 2024-09-21 NOTE — PROCEDURES
1.Shoulder injection with ultrasound guidance  Location: right  After discussing benefits and possible side effects, we proceeded with a an ultrasound-guided intra-articular shoulder injection. The patient was consented.  The patient was seated, and the posterolateral shoulder was palpated.  A high-frequency linear array transducer was used for ultrasound visualization of the posterior shoulder.  There was significant irregularity of the humeral head.  The skin was sterilely prepped.  Using ultrasound a significant effusion was noted anterolaterally over the shoulder joint.  Using an 18-gauge needle under ultrasound guidance 30 cc of serosanguineous fluid was removed.  Then, A 22 -gauge 2-1/2 inch spinal needle was introduced via a posterior approach under ultrasound guidance to the glenohumeral joint just lateral to the labrum.   A total of 2 cc of 40 mg/ml Kenalog and 4 cc of 1% lidocaine was injected under direct ultrasound visualization.     Permanent images were saved.     Shoulder injection  Location: left  After discussing benefits and possible side effects, we proceeded with a subacromial bursa injection. The patient was consented.  The patient was seated, and the posterolateral shoulder was palpated. The skin was sterilely prepped.  A 25-gauge needle was introduced into the subacromial space.   A total of 2 cc of 40 mg/ml Kenalog and 4 cc of 1% lidocaine was injected.     The patient tolerated the procedures well without adverse effects.

## 2024-12-11 ENCOUNTER — OFFICE VISIT (OUTPATIENT)
Dept: PHYSICAL MEDICINE AND REHAB | Facility: CLINIC | Age: 89
End: 2024-12-11
Payer: MEDICARE

## 2024-12-11 VITALS — WEIGHT: 120 LBS | BODY MASS INDEX: 23 KG/M2

## 2024-12-11 DIAGNOSIS — M25.411 EFFUSION OF JOINT OF RIGHT SHOULDER: Primary | ICD-10-CM

## 2024-12-11 DIAGNOSIS — G89.4 CHRONIC PAIN SYNDROME: ICD-10-CM

## 2024-12-11 DIAGNOSIS — M19.011 PRIMARY OSTEOARTHRITIS OF RIGHT SHOULDER: ICD-10-CM

## 2024-12-11 PROCEDURE — 1125F AMNT PAIN NOTED PAIN PRSNT: CPT | Performed by: PHYSICAL MEDICINE & REHABILITATION

## 2024-12-11 PROCEDURE — 20610 DRAIN/INJ JOINT/BURSA W/O US: CPT | Performed by: PHYSICAL MEDICINE & REHABILITATION

## 2024-12-11 PROCEDURE — 99213 OFFICE O/P EST LOW 20 MIN: CPT | Performed by: PHYSICAL MEDICINE & REHABILITATION

## 2024-12-11 PROCEDURE — 1159F MED LIST DOCD IN RCRD: CPT | Performed by: PHYSICAL MEDICINE & REHABILITATION

## 2024-12-11 RX ORDER — LIDOCAINE HYDROCHLORIDE 10 MG/ML
8 INJECTION, SOLUTION INFILTRATION; PERINEURAL ONCE
Status: COMPLETED | OUTPATIENT
Start: 2024-12-11 | End: 2024-12-11

## 2024-12-11 RX ORDER — TRIAMCINOLONE ACETONIDE 40 MG/ML
160 INJECTION, SUSPENSION INTRA-ARTICULAR; INTRAMUSCULAR ONCE
Status: COMPLETED | OUTPATIENT
Start: 2024-12-11 | End: 2024-12-11

## 2024-12-11 RX ORDER — TRAMADOL HYDROCHLORIDE 50 MG/1
50 TABLET ORAL EVERY 6 HOURS PRN
COMMUNITY

## 2024-12-17 NOTE — PROCEDURES
Shoulder injection and aspiration  Location: right  After discussing benefits and possible side effects, we proceeded with a shoulder injection. The patient was consented.  The patient was seated. A significant effusion was noted anterolaterally over the shoulder.  Using an 18-gauge needle 40 cc of serosanguineous fluid was removed from the anterior shoulder.  Then, a 25 -gauge 1-1/2 inch spinal needle was introduced via a posterior approach into the subacromial space.   A total of 2 cc of 40 mg/ml Kenalog and 4 cc of 1% lidocaine was injected under direct ultrasound visualization.     The patient tolerated the procedures well without adverse effects.

## 2024-12-17 NOTE — PROGRESS NOTES
[facility]  Progress Note    CHIEF COMPLAINT:    Chief Complaint   Patient presents with    Follow - Up     Lov 9/17/24 Bilateral shoulder pain. Pain 7/10.Admits T/N bilateral in the hands . Tylenol and gabapentin tramadol with some relief.        History of Present Illness:  Shi Dhillon is a 88 year old female who presents today for follow up for symptoms of right shoulder pain with chronic intermittent effusion.  Symptoms are worse in the shoulder is more swollen.  She is here requesting a repeat shoulder aspiration and injection.    PAST MEDICAL HISTORY:  Past Medical History:    Back pain    Diabetes (HCC)    Essential hypertension       SURGICAL HISTORY:  No past surgical history on file.    SOCIAL HISTORY:   Social History     Occupational History    Not on file   Tobacco Use    Smoking status: Never    Smokeless tobacco: Never   Vaping Use    Vaping status: Never Used   Substance and Sexual Activity    Alcohol use: Never    Drug use: Never    Sexual activity: Not on file       CURRENT MEDICATIONS:   Current Outpatient Medications   Medication Sig Dispense Refill    traMADol 50 MG Oral Tab Take 1 tablet (50 mg total) by mouth every 6 (six) hours as needed for Pain.      bumetanide 2 MG Oral Tab       DULoxetine 20 MG Oral Cap DR Particles       hydrALAZINE 100 MG Oral Tab       meclizine 25 MG Oral Tab Take 1 tablet (25 mg total) by mouth As Directed.      furosemide 40 MG Oral Tab Take 1 tablet (40 mg total) by mouth daily. 30 tablet 0    gabapentin 100 MG Oral Cap Take 1 capsule (100 mg total) by mouth nightly. 30 capsule 0    isosorbide mononitrate ER 30 MG Oral Tablet 24 Hr Take 1 tablet (30 mg total) by mouth daily.      acetaminophen 325 MG Oral Tab Take 2 tablets (650 mg total) by mouth every 6 (six) hours.      carvedilol 12.5 MG Oral Tab Take 1 tablet (12.5 mg total) by mouth 2 (two) times daily with meals.      lisinopril 20 MG Oral Tab Take 1.5  tablets (30 mg total) by mouth daily. Pt takes daily at 1600      glimepiride 4 MG Oral Tab Take 1.5 mg by mouth daily.      atorvastatin 10 MG Oral Tab Take 1 tablet (10 mg total) by mouth daily.      aspirin 81 MG Oral Tab EC Take 1 tablet (81 mg total) by mouth daily.         ALLERGIES:   Allergies[1]      PHYSICAL EXAM:   Wt 120 lb (54.4 kg)   BMI 23.44 kg/m²     Body mass index is 23.44 kg/m².      General: No immediate distress, examined in wheelchair  Extremities: No upper extremity edema bilaterally   Shoulders: Obvious anterior effusion of the right shoulder, very limited abduction and rotation  Neuro:   Cognition: alert & oriented x 3, attentive, comprehention intact, spontaneous speech intact  Strength:  Upper extremities have 5/5 strength      ASSESSMENT AND PLAN:  1. Effusion of joint of right shoulder  Today we aspirated and injected the right shoulder.  Too many comorbidities for shoulder replacement unfortunately.  She will return as needed.  - lidocaine (Xylocaine) 1 % injection  - triamcinolone acetonide (Kenalog-40) 40 MG/ML injection 160 mg    2. Primary osteoarthritis of right shoulder  - lidocaine (Xylocaine) 1 % injection  - triamcinolone acetonide (Kenalog-40) 40 MG/ML injection 160 mg              The patient was in agreement with the assessment and plan.  All questions were answered.        Guy Gtz MD  Physical Medicine and Rehabilitation/Sports Medicine  [facility]             [1]   Allergies  Allergen Reactions    Codeine NAUSEA ONLY     Nausea

## 2025-02-26 ENCOUNTER — OFFICE VISIT (OUTPATIENT)
Dept: PHYSICAL MEDICINE AND REHAB | Facility: CLINIC | Age: 89
End: 2025-02-26
Payer: MEDICARE

## 2025-02-26 DIAGNOSIS — M25.411 EFFUSION OF JOINT OF RIGHT SHOULDER: Primary | ICD-10-CM

## 2025-02-26 DIAGNOSIS — M19.011 PRIMARY OSTEOARTHRITIS OF SHOULDERS, BILATERAL: ICD-10-CM

## 2025-02-26 DIAGNOSIS — G89.4 CHRONIC PAIN SYNDROME: ICD-10-CM

## 2025-02-26 DIAGNOSIS — E11.42 DIABETIC POLYNEUROPATHY ASSOCIATED WITH TYPE 2 DIABETES MELLITUS (HCC): ICD-10-CM

## 2025-02-26 DIAGNOSIS — M19.011 PRIMARY OSTEOARTHRITIS OF RIGHT SHOULDER: ICD-10-CM

## 2025-02-26 DIAGNOSIS — K92.0 GASTROINTESTINAL HEMORRHAGE WITH HEMATEMESIS: ICD-10-CM

## 2025-02-26 DIAGNOSIS — M19.012 PRIMARY OSTEOARTHRITIS OF SHOULDERS, BILATERAL: ICD-10-CM

## 2025-02-26 PROCEDURE — 99214 OFFICE O/P EST MOD 30 MIN: CPT | Performed by: PHYSICAL MEDICINE & REHABILITATION

## 2025-02-26 RX ORDER — BUPRENORPHINE 5 UG/H
1 PATCH TRANSDERMAL WEEKLY
Qty: 4 PATCH | Refills: 0 | Status: SHIPPED | OUTPATIENT
Start: 2025-02-26 | End: 2025-03-26

## 2025-02-26 NOTE — PROGRESS NOTES
[facility]  Progress Note    CHIEF COMPLAINT:    Chief Complaint   Patient presents with    Follow - Up     LOV 12/11/24, Patient is present to follow up on right shoulder pain. Patient states the pain is in both shoulders. The pain is a constant ache, Pain 8/10. Denies N/T.  Admits Weakness. Denies PT. Admits Gabapentin and Tylenol taken daily.        History of Present Illness:  Shi Dhillon is a 89 year old female who presents today for follow up for symptoms of shoulder osteoarthritis.  I did 3 sets of shoulder injections on her in 2024 in June, September and December.  These all helped temporarily.  She has severe radiographic shoulder arthritis.    PAST MEDICAL HISTORY:  Past Medical History:    Back pain    Diabetes (HCC)    Essential hypertension       SURGICAL HISTORY:  No past surgical history on file.    SOCIAL HISTORY:   Social History     Occupational History    Not on file   Tobacco Use    Smoking status: Never    Smokeless tobacco: Never   Vaping Use    Vaping status: Never Used   Substance and Sexual Activity    Alcohol use: Never    Drug use: Never    Sexual activity: Not on file       CURRENT MEDICATIONS:   Current Outpatient Medications   Medication Sig Dispense Refill    buprenorphine (BUTRANS) 5 MCG/HR Transdermal Patch Weekly Place 1 patch onto the skin once a week for 28 days. 4 patch 0    traMADol 50 MG Oral Tab Take 1 tablet (50 mg total) by mouth every 6 (six) hours as needed for Pain.      bumetanide 2 MG Oral Tab       DULoxetine 20 MG Oral Cap DR Particles       hydrALAZINE 100 MG Oral Tab       meclizine 25 MG Oral Tab Take 1 tablet (25 mg total) by mouth As Directed.      furosemide 40 MG Oral Tab Take 1 tablet (40 mg total) by mouth daily. 30 tablet 0    gabapentin 100 MG Oral Cap Take 1 capsule (100 mg total) by mouth nightly. 30 capsule 0    isosorbide mononitrate ER 30 MG Oral Tablet 24 Hr Take 1 tablet (30 mg total) by mouth  daily.      acetaminophen 325 MG Oral Tab Take 2 tablets (650 mg total) by mouth every 6 (six) hours.      carvedilol 12.5 MG Oral Tab Take 1 tablet (12.5 mg total) by mouth 2 (two) times daily with meals.      lisinopril 20 MG Oral Tab Take 1.5 tablets (30 mg total) by mouth daily. Pt takes daily at 1600      glimepiride 4 MG Oral Tab Take 1.5 mg by mouth daily.      atorvastatin 10 MG Oral Tab Take 1 tablet (10 mg total) by mouth daily.      aspirin 81 MG Oral Tab EC Take 1 tablet (81 mg total) by mouth daily.         ALLERGIES:   Allergies[1]      PHYSICAL EXAM:   There were no vitals taken for this visit.    There is no height or weight on file to calculate BMI.      Unchanged        Data    Radiology Imaging:  I reviewed a plain film x-ray of the right shoulder showing no fractures, slight humeral head elevation, mild osteoarthritic change.  There are subacromial calcifications consistent with calcific tendinitis.     ASSESSMENT AND PLAN:  1. Chronic pain syndrome  Lets try Butrans patches.  Return in 4 weeks  - buprenorphine (BUTRANS) 5 MCG/HR Transdermal Patch Weekly; Place 1 patch onto the skin once a week for 28 days.  Dispense: 4 patch; Refill: 0    2. Effusion of joint of right shoulder  She has exceeded the limit of shoulder injections for the year.  Will need to wait till June    3. Primary osteoarthritis of right shoulder  As above.  Butrans should help.  - buprenorphine (BUTRANS) 5 MCG/HR Transdermal Patch Weekly; Place 1 patch onto the skin once a week for 28 days.  Dispense: 4 patch; Refill: 0    4. Diabetic polyneuropathy associated with type 2 diabetes mellitus (HCC)  Avoiding steroids    5. Gastrointestinal hemorrhage with hematemesis  Avoiding NSAIDs          The patient was in agreement with the assessment and plan.  All questions were answered.        Guy Gtz MD  Physical Medicine and Rehabilitation/Sports Medicine  [facility]             [1]   Allergies  Allergen  Reactions    Codeine NAUSEA ONLY     Nausea

## 2025-03-04 ENCOUNTER — TELEPHONE (OUTPATIENT)
Dept: PHYSICAL MEDICINE AND REHAB | Facility: CLINIC | Age: 89
End: 2025-03-04

## 2025-03-06 NOTE — TELEPHONE ENCOUNTER
Buprenorphine 5 mg patches approved by Insurance and authorization is valid until 12/31/2025. Placed for scanning.

## 2025-04-30 ENCOUNTER — HOSPITAL ENCOUNTER (OUTPATIENT)
Facility: HOSPITAL | Age: 89
Setting detail: OBSERVATION
Discharge: HOME HEALTH | End: 2025-05-01
Attending: EMERGENCY MEDICINE | Admitting: INTERNAL MEDICINE
Payer: MEDICARE

## 2025-04-30 ENCOUNTER — APPOINTMENT (OUTPATIENT)
Dept: GENERAL RADIOLOGY | Facility: HOSPITAL | Age: 89
End: 2025-04-30
Attending: EMERGENCY MEDICINE
Payer: MEDICARE

## 2025-04-30 DIAGNOSIS — M72.2 PLANTAR FASCIITIS OF LEFT FOOT: ICD-10-CM

## 2025-04-30 DIAGNOSIS — R11.2 NAUSEA AND VOMITING, UNSPECIFIED VOMITING TYPE: ICD-10-CM

## 2025-04-30 DIAGNOSIS — M79.672 LEFT FOOT PAIN: Primary | ICD-10-CM

## 2025-04-30 LAB
ALBUMIN SERPL-MCNC: 4.4 G/DL (ref 3.2–4.8)
ALBUMIN/GLOB SERPL: 1.8 {RATIO} (ref 1–2)
ALP LIVER SERPL-CCNC: 63 U/L (ref 55–142)
ALT SERPL-CCNC: 8 U/L (ref 10–49)
ANION GAP SERPL CALC-SCNC: 10 MMOL/L (ref 0–18)
AST SERPL-CCNC: 11 U/L (ref ?–34)
BASOPHILS # BLD AUTO: 0.02 X10(3) UL (ref 0–0.2)
BASOPHILS NFR BLD AUTO: 0.3 %
BILIRUB SERPL-MCNC: <0.2 MG/DL (ref 0.2–1.1)
BUN BLD-MCNC: 55 MG/DL (ref 9–23)
CALCIUM BLD-MCNC: 9.5 MG/DL (ref 8.7–10.6)
CHLORIDE SERPL-SCNC: 102 MMOL/L (ref 98–112)
CO2 SERPL-SCNC: 24 MMOL/L (ref 21–32)
CREAT BLD-MCNC: 3.04 MG/DL (ref 0.55–1.02)
EGFRCR SERPLBLD CKD-EPI 2021: 14 ML/MIN/1.73M2 (ref 60–?)
EOSINOPHIL # BLD AUTO: 0.11 X10(3) UL (ref 0–0.7)
EOSINOPHIL NFR BLD AUTO: 1.6 %
ERYTHROCYTE [DISTWIDTH] IN BLOOD BY AUTOMATED COUNT: 12.7 %
GLOBULIN PLAS-MCNC: 2.5 G/DL (ref 2–3.5)
GLUCOSE BLD-MCNC: 99 MG/DL (ref 70–99)
HCT VFR BLD AUTO: 25.2 % (ref 35–48)
HGB BLD-MCNC: 8.5 G/DL (ref 12–16)
IMM GRANULOCYTES # BLD AUTO: 0.03 X10(3) UL (ref 0–1)
IMM GRANULOCYTES NFR BLD: 0.4 %
LYMPHOCYTES # BLD AUTO: 1.71 X10(3) UL (ref 1–4)
LYMPHOCYTES NFR BLD AUTO: 25.6 %
MCH RBC QN AUTO: 33.5 PG (ref 26–34)
MCHC RBC AUTO-ENTMCNC: 33.7 G/DL (ref 31–37)
MCV RBC AUTO: 99.2 FL (ref 80–100)
MONOCYTES # BLD AUTO: 1.29 X10(3) UL (ref 0.1–1)
MONOCYTES NFR BLD AUTO: 19.3 %
NEUTROPHILS # BLD AUTO: 3.51 X10 (3) UL (ref 1.5–7.7)
NEUTROPHILS # BLD AUTO: 3.51 X10(3) UL (ref 1.5–7.7)
NEUTROPHILS NFR BLD AUTO: 52.8 %
OSMOLALITY SERPL CALC.SUM OF ELEC: 297 MOSM/KG (ref 275–295)
PLATELET # BLD AUTO: 226 10(3)UL (ref 150–450)
POTASSIUM SERPL-SCNC: 4.5 MMOL/L (ref 3.5–5.1)
PROT SERPL-MCNC: 6.9 G/DL (ref 5.7–8.2)
RBC # BLD AUTO: 2.54 X10(6)UL (ref 3.8–5.3)
SODIUM SERPL-SCNC: 136 MMOL/L (ref 136–145)
TROPONIN I SERPL HS-MCNC: 7 NG/L (ref ?–34)
WBC # BLD AUTO: 6.7 X10(3) UL (ref 4–11)

## 2025-04-30 PROCEDURE — 74018 RADEX ABDOMEN 1 VIEW: CPT | Performed by: EMERGENCY MEDICINE

## 2025-04-30 PROCEDURE — 99285 EMERGENCY DEPT VISIT HI MDM: CPT

## 2025-04-30 PROCEDURE — 85025 COMPLETE CBC W/AUTO DIFF WBC: CPT | Performed by: EMERGENCY MEDICINE

## 2025-04-30 PROCEDURE — 84484 ASSAY OF TROPONIN QUANT: CPT | Performed by: EMERGENCY MEDICINE

## 2025-04-30 PROCEDURE — 96375 TX/PRO/DX INJ NEW DRUG ADDON: CPT

## 2025-04-30 PROCEDURE — 93010 ELECTROCARDIOGRAM REPORT: CPT

## 2025-04-30 PROCEDURE — 93005 ELECTROCARDIOGRAM TRACING: CPT

## 2025-04-30 PROCEDURE — 96361 HYDRATE IV INFUSION ADD-ON: CPT

## 2025-04-30 PROCEDURE — 80053 COMPREHEN METABOLIC PANEL: CPT | Performed by: EMERGENCY MEDICINE

## 2025-04-30 PROCEDURE — 73630 X-RAY EXAM OF FOOT: CPT | Performed by: EMERGENCY MEDICINE

## 2025-04-30 RX ORDER — SPIRONOLACTONE 25 MG/1
25 TABLET ORAL DAILY
COMMUNITY
End: 2025-04-30

## 2025-04-30 RX ORDER — METOCLOPRAMIDE HYDROCHLORIDE 5 MG/ML
10 INJECTION INTRAMUSCULAR; INTRAVENOUS ONCE
Status: COMPLETED | OUTPATIENT
Start: 2025-04-30 | End: 2025-04-30

## 2025-04-30 RX ORDER — ONDANSETRON 4 MG/1
4 TABLET, FILM COATED ORAL EVERY 8 HOURS PRN
COMMUNITY

## 2025-04-30 RX ORDER — DIPHENHYDRAMINE HYDROCHLORIDE 50 MG/ML
25 INJECTION, SOLUTION INTRAMUSCULAR; INTRAVENOUS ONCE
Status: COMPLETED | OUTPATIENT
Start: 2025-04-30 | End: 2025-04-30

## 2025-04-30 RX ORDER — ONDANSETRON 2 MG/ML
4 INJECTION INTRAMUSCULAR; INTRAVENOUS ONCE
Status: COMPLETED | OUTPATIENT
Start: 2025-04-30 | End: 2025-04-30

## 2025-04-30 RX ORDER — SODIUM CHLORIDE 9 MG/ML
1000 INJECTION, SOLUTION INTRAVENOUS ONCE
Status: COMPLETED | OUTPATIENT
Start: 2025-04-30 | End: 2025-05-01

## 2025-04-30 RX ORDER — ONDANSETRON 2 MG/ML
INJECTION INTRAMUSCULAR; INTRAVENOUS
Status: COMPLETED
Start: 2025-04-30 | End: 2025-04-30

## 2025-04-30 RX ORDER — AMLODIPINE BESYLATE 5 MG/1
5 TABLET ORAL DAILY
COMMUNITY

## 2025-04-30 RX ORDER — INSULIN LISPRO 100 [IU]/ML
INJECTION, SOLUTION SUBCUTANEOUS 2 TIMES DAILY
COMMUNITY

## 2025-04-30 RX ORDER — MORPHINE SULFATE 2 MG/ML
2 INJECTION, SOLUTION INTRAMUSCULAR; INTRAVENOUS ONCE
Status: DISCONTINUED | OUTPATIENT
Start: 2025-04-30 | End: 2025-04-30

## 2025-04-30 RX ORDER — MULTIVITAMIN WITH IRON
TABLET ORAL
COMMUNITY

## 2025-04-30 RX ORDER — POTASSIUM CHLORIDE 750 MG/1
20 TABLET, EXTENDED RELEASE ORAL
Status: ON HOLD | COMMUNITY
End: 2025-05-01 | Stop reason: CLARIF

## 2025-05-01 ENCOUNTER — APPOINTMENT (OUTPATIENT)
Dept: MRI IMAGING | Facility: HOSPITAL | Age: 89
End: 2025-05-01
Attending: EMERGENCY MEDICINE
Payer: MEDICARE

## 2025-05-01 VITALS
WEIGHT: 117 LBS | RESPIRATION RATE: 16 BRPM | HEART RATE: 70 BPM | HEIGHT: 62 IN | OXYGEN SATURATION: 100 % | TEMPERATURE: 98 F | BODY MASS INDEX: 21.53 KG/M2 | DIASTOLIC BLOOD PRESSURE: 48 MMHG | SYSTOLIC BLOOD PRESSURE: 171 MMHG

## 2025-05-01 PROBLEM — M79.672 LEFT FOOT PAIN: Status: ACTIVE | Noted: 2025-05-01

## 2025-05-01 PROBLEM — R11.2 NAUSEA AND VOMITING, UNSPECIFIED VOMITING TYPE: Status: ACTIVE | Noted: 2025-05-01

## 2025-05-01 LAB
ATRIAL RATE: 83 BPM
BILIRUB UR QL STRIP.AUTO: NEGATIVE
CREAT BLD-MCNC: 2.77 MG/DL (ref 0.55–1.02)
EGFRCR SERPLBLD CKD-EPI 2021: 16 ML/MIN/1.73M2 (ref 60–?)
GLUCOSE UR STRIP.AUTO-MCNC: 200 MG/DL
KETONES UR STRIP.AUTO-MCNC: NEGATIVE MG/DL
LEUKOCYTE ESTERASE UR QL STRIP.AUTO: 75
NITRITE UR QL STRIP.AUTO: NEGATIVE
P AXIS: 63 DEGREES
P-R INTERVAL: 146 MS
PH UR STRIP.AUTO: 5.5 [PH] (ref 5–8)
PROT UR STRIP.AUTO-MCNC: 30 MG/DL
Q-T INTERVAL: 368 MS
QRS DURATION: 76 MS
QTC CALCULATION (BEZET): 432 MS
R AXIS: -20 DEGREES
RBC UR QL AUTO: NEGATIVE
SP GR UR STRIP.AUTO: 1.01 (ref 1–1.03)
T AXIS: 78 DEGREES
UROBILINOGEN UR STRIP.AUTO-MCNC: NORMAL MG/DL
VENTRICULAR RATE: 83 BPM

## 2025-05-01 PROCEDURE — 36000 PLACE NEEDLE IN VEIN: CPT

## 2025-05-01 PROCEDURE — 73720 MRI LWR EXTREMITY W/O&W/DYE: CPT | Performed by: EMERGENCY MEDICINE

## 2025-05-01 PROCEDURE — 81001 URINALYSIS AUTO W/SCOPE: CPT | Performed by: INTERNAL MEDICINE

## 2025-05-01 PROCEDURE — 97530 THERAPEUTIC ACTIVITIES: CPT

## 2025-05-01 PROCEDURE — 97161 PT EVAL LOW COMPLEX 20 MIN: CPT

## 2025-05-01 PROCEDURE — 87186 SC STD MICRODIL/AGAR DIL: CPT | Performed by: INTERNAL MEDICINE

## 2025-05-01 PROCEDURE — 87077 CULTURE AEROBIC IDENTIFY: CPT | Performed by: INTERNAL MEDICINE

## 2025-05-01 PROCEDURE — 87086 URINE CULTURE/COLONY COUNT: CPT | Performed by: INTERNAL MEDICINE

## 2025-05-01 PROCEDURE — 82565 ASSAY OF CREATININE: CPT | Performed by: INTERNAL MEDICINE

## 2025-05-01 PROCEDURE — 87040 BLOOD CULTURE FOR BACTERIA: CPT | Performed by: EMERGENCY MEDICINE

## 2025-05-01 PROCEDURE — A9575 INJ GADOTERATE MEGLUMI 0.1ML: HCPCS | Performed by: EMERGENCY MEDICINE

## 2025-05-01 PROCEDURE — 96375 TX/PRO/DX INJ NEW DRUG ADDON: CPT

## 2025-05-01 PROCEDURE — 96365 THER/PROPH/DIAG IV INF INIT: CPT

## 2025-05-01 RX ORDER — HEPARIN SODIUM 5000 [USP'U]/ML
5000 INJECTION, SOLUTION INTRAVENOUS; SUBCUTANEOUS EVERY 8 HOURS SCHEDULED
Status: DISCONTINUED | OUTPATIENT
Start: 2025-05-01 | End: 2025-05-01

## 2025-05-01 RX ORDER — METOCLOPRAMIDE HYDROCHLORIDE 5 MG/ML
2.5 INJECTION INTRAMUSCULAR; INTRAVENOUS EVERY 8 HOURS PRN
Status: DISCONTINUED | OUTPATIENT
Start: 2025-05-01 | End: 2025-05-01

## 2025-05-01 RX ORDER — BISACODYL 10 MG
10 SUPPOSITORY, RECTAL RECTAL
Qty: 5 SUPPOSITORY | Refills: 0 | Status: SHIPPED | OUTPATIENT
Start: 2025-05-01

## 2025-05-01 RX ORDER — ACETAMINOPHEN 160 MG
4000 TABLET,DISINTEGRATING ORAL DAILY
COMMUNITY

## 2025-05-01 RX ORDER — TRAMADOL HYDROCHLORIDE 50 MG/1
50 TABLET ORAL EVERY 12 HOURS PRN
Status: DISCONTINUED | OUTPATIENT
Start: 2025-05-01 | End: 2025-05-01

## 2025-05-01 RX ORDER — SENNA AND DOCUSATE SODIUM 50; 8.6 MG/1; MG/1
2 TABLET, FILM COATED ORAL DAILY PRN
Qty: 30 TABLET | Refills: 0 | Status: SHIPPED | OUTPATIENT
Start: 2025-05-01

## 2025-05-01 RX ORDER — AMLODIPINE BESYLATE 5 MG/1
5 TABLET ORAL DAILY
Status: DISCONTINUED | OUTPATIENT
Start: 2025-05-01 | End: 2025-05-01

## 2025-05-01 RX ORDER — SENNOSIDES 8.6 MG
17.2 TABLET ORAL NIGHTLY PRN
Status: DISCONTINUED | OUTPATIENT
Start: 2025-05-01 | End: 2025-05-01

## 2025-05-01 RX ORDER — DIPHENHYDRAMINE HYDROCHLORIDE 50 MG/ML
10 INJECTION, SOLUTION INTRAMUSCULAR; INTRAVENOUS
Status: COMPLETED | OUTPATIENT
Start: 2025-05-01 | End: 2025-05-01

## 2025-05-01 RX ORDER — POLYETHYLENE GLYCOL 3350 17 G/17G
17 POWDER, FOR SOLUTION ORAL 2 TIMES DAILY
Status: DISCONTINUED | OUTPATIENT
Start: 2025-05-01 | End: 2025-05-01

## 2025-05-01 RX ORDER — BISACODYL 10 MG
10 SUPPOSITORY, RECTAL RECTAL
Status: DISCONTINUED | OUTPATIENT
Start: 2025-05-01 | End: 2025-05-01

## 2025-05-01 RX ORDER — SENNA AND DOCUSATE SODIUM 50; 8.6 MG/1; MG/1
2 TABLET, FILM COATED ORAL 2 TIMES DAILY
Status: DISCONTINUED | OUTPATIENT
Start: 2025-05-01 | End: 2025-05-01

## 2025-05-01 RX ORDER — ASPIRIN 81 MG/1
81 TABLET ORAL DAILY
Status: DISCONTINUED | OUTPATIENT
Start: 2025-05-01 | End: 2025-05-01

## 2025-05-01 RX ORDER — SODIUM CHLORIDE 9 MG/ML
INJECTION, SOLUTION INTRAVENOUS CONTINUOUS
Status: DISCONTINUED | OUTPATIENT
Start: 2025-05-01 | End: 2025-05-01

## 2025-05-01 RX ORDER — TRAMADOL HYDROCHLORIDE 50 MG/1
50 TABLET ORAL EVERY 6 HOURS PRN
Status: DISCONTINUED | OUTPATIENT
Start: 2025-05-01 | End: 2025-05-01

## 2025-05-01 RX ORDER — MORPHINE SULFATE 2 MG/ML
2 INJECTION, SOLUTION INTRAMUSCULAR; INTRAVENOUS ONCE
Status: COMPLETED | OUTPATIENT
Start: 2025-05-01 | End: 2025-05-01

## 2025-05-01 RX ORDER — SACCHAROMYCES BOULARDII 250 MG
250 CAPSULE ORAL 2 TIMES DAILY
COMMUNITY

## 2025-05-01 RX ORDER — GABAPENTIN 100 MG/1
200 CAPSULE ORAL 2 TIMES DAILY
Status: DISCONTINUED | OUTPATIENT
Start: 2025-05-01 | End: 2025-05-01

## 2025-05-01 RX ORDER — POLYETHYLENE GLYCOL 3350 17 G/17G
17 POWDER, FOR SOLUTION ORAL DAILY PRN
Status: DISCONTINUED | OUTPATIENT
Start: 2025-05-01 | End: 2025-05-01

## 2025-05-01 RX ORDER — ISOSORBIDE MONONITRATE 30 MG/1
30 TABLET, EXTENDED RELEASE ORAL DAILY
Status: DISCONTINUED | OUTPATIENT
Start: 2025-05-01 | End: 2025-05-01

## 2025-05-01 RX ORDER — POLYETHYLENE GLYCOL 3350 17 G/17G
17 POWDER, FOR SOLUTION ORAL DAILY
Qty: 30 EACH | Refills: 0 | Status: SHIPPED | OUTPATIENT
Start: 2025-05-01

## 2025-05-01 RX ORDER — MELATONIN
1000 DAILY
COMMUNITY

## 2025-05-01 RX ORDER — ONDANSETRON 2 MG/ML
4 INJECTION INTRAMUSCULAR; INTRAVENOUS EVERY 6 HOURS PRN
Status: DISCONTINUED | OUTPATIENT
Start: 2025-05-01 | End: 2025-05-01

## 2025-05-01 RX ORDER — DULOXETIN HYDROCHLORIDE 20 MG/1
20 CAPSULE, DELAYED RELEASE ORAL DAILY
Status: DISCONTINUED | OUTPATIENT
Start: 2025-05-01 | End: 2025-05-01

## 2025-05-01 RX ORDER — METOCLOPRAMIDE HYDROCHLORIDE 5 MG/ML
5 INJECTION INTRAMUSCULAR; INTRAVENOUS EVERY 8 HOURS PRN
Status: DISCONTINUED | OUTPATIENT
Start: 2025-05-01 | End: 2025-05-01

## 2025-05-01 RX ORDER — ACETAMINOPHEN 500 MG
500 TABLET ORAL EVERY 4 HOURS PRN
Status: DISCONTINUED | OUTPATIENT
Start: 2025-05-01 | End: 2025-05-01

## 2025-05-01 RX ORDER — ATORVASTATIN CALCIUM 10 MG/1
10 TABLET, FILM COATED ORAL DAILY
Status: DISCONTINUED | OUTPATIENT
Start: 2025-05-01 | End: 2025-05-01

## 2025-05-01 NOTE — PLAN OF CARE
NURSING DISCHARGE NOTE    Discharged Home via Wheelchair.  Accompanied by Family member  Belongings Taken by patient/family.    AVS printed and discussed, IV removed, Rx's e-scribed, reminded to  medicine at pharmacy. Pt ready to discharge home to [facility].

## 2025-05-01 NOTE — PROGRESS NOTES
[facility] Dosing Service      Initial Pharmacokinetic Consult for Vancomycin Dosing     Shi Dhillon is a 89 year old female who is being initiated on vancomycin therapy for cellulitis.  Pharmacy has been asked to dose vancomycin by Dr. Carbone. The initial treatment and monitoring approach will be non-AUC strategy.        Weight and Temperature:    Wt Readings from Last 1 Encounters:   25 53.1 kg (117 lb)        Temp Readings from Last 1 Encounters:   25 98.1 °F (36.7 °C) (Oral)      Labs:   Recent Labs   Lab 25  2220   CREATSERUM 3.04*      Estimated Creatinine Clearance: 10.1 mL/min (A) (based on SCr of 3.04 mg/dL (H)).     Recent Labs   Lab 25  2220   WBC 6.7          The Pharmacokinetic Target is:      Trough/random 10-15 mg/L    Renal Dosing Considerations:      NILTON/ARF     Assessment/Plan:     Initial/Loading dose: Has received 750 mg IV (15 mg/kg, capped at 2250 mg) x 1 initial dose.      Maintenance dose: Pharmacy will dose vancomycin per levels.    Monitorin) Plan for vancomycin random level to be obtained in approximately 24 hours.    2) Pharmacy will order SCr as clinically indicated to assess renal function.    3) Pharmacy will monitor for toxicity and efficacy, adjust vancomycin dose and/or frequency, and order vancomycin levels as appropriate per the Pharmacy and Therapeutics Committee approved protocol until discontinuation of the medication.       We appreciate the opportunity to assist in the care of this patient.     Shira Tavarez, PharmD  2025  5:41 AM  [facility] Extension: [phone]

## 2025-05-01 NOTE — ED QUICK NOTES
Orders for admission, patient is aware of plan and ready to go upstairs. Any questions, please call ED RN Lyn at extension [phone].     Patient Covid vaccination status: Unvaccinated     COVID Test Ordered in ED: None    COVID Suspicion at Admission: N/A    Running Infusions: Medication Infusions[1] IVF @125mL/hr, vancomycin @250mL/hr    Mental Status/LOC at time of transport: AOx4    Other pertinent information: From NH, reports she is in a wheel chair at NH, can move legs, pivot but does not walk.    CIWA score: N/A   NIH score:  N/A             [1]

## 2025-05-01 NOTE — CM/SW NOTE
05/01/25 1000   CM/SW Referral Data   Referral Source Social Work (self-referral)   Reason for Referral Discharge planning   Informant Patient;EMR;Clinical Staff Member   Patient Info   Patient's Current Mental Status at Time of Assessment Alert;Oriented   Patient's Home Environment Assisted Living   Post Acute Care Provider Upon Admission   ([facility])   Patient lives with Alone   Patient Status Prior to Admission   Independent with ADLs and Mobility No   Pt. requires assistance with Housework;Driving;Meals;Bathing;Dressing;Medications;Toileting   Services in place prior to admission DME/Supplies at home   Type of DME/Supplies Wheelchair   Discharge Needs   Anticipated D/C needs To be determined;Assisted/Supported living       Patient is an 89 y/o woman admitted with foot pain. PT eval pending. Met with pt at bedside to discuss DC planning. Pt stated she has lived at [facility] for about 1.5 years. She is wheelchair bound and receives assistance with transfers, dressing, bathing, toileting and transport to dining room. She has history of on-site PT but has not been doing therapy recently. Previous history of AMIRAH at [facility]. Pt is hopeful to return to [facility] at DC. Await MD and therapy recommendations for further DC Planning. / to remain available for support and/or discharge planning.     [facility]  [phone]    Jazmin Asif [facility]  Discharge Planner  [phone]

## 2025-05-01 NOTE — PLAN OF CARE
NURSING ADMISSION NOTE      Patient admitted via Cart  Oriented to room.  Safety precautions initiated.  Bed in low position.  Call light in reach.    Patient A&Ox4. VSS. RA. Patient here for left foot pain. Declines pain at this time. Denies nausea/vomiting. Skin check completed with Tomasz GORDON. Redness noted to sacrum, blanchable. Mepilex placed. Last BM 4/30. Voiding via purewick. Patient states she is wheelchair bound at [facility]. Fall precautions in place. Call light in reach.

## 2025-05-01 NOTE — CONSULTS
Vascular Surgery Consultation    Shi Dhillon Patient Status:  Observation    1935 MRN [id number]   Location [facility] 3SW-A Attending Radha Carbone MD   Hosp Day # 0 PCP Breonna Cabral MD     Reason for Consultation:  Left foot pain    History of Present Illness:  Shi Dhillon is a a(n) 89 year old female who was admitted to the hospital through the emergency room with ongoing left foot pain.  Foot pain radiates to her back of her calf.  She has a history of a heel spur.  She also has significant arch pain.  She has normal range of motion of the foot and no ischemic changes.  On physical exam she has an easily palpable dorsalis pedis pulse.      History:  Past Medical History[1]  Past Surgical History[2]  Family History[3]   reports that she has never smoked. She has never used smokeless tobacco. She reports that she does not drink alcohol and does not use drugs.    Allergies:  Allergies[4]    Medications:  Current Hospital Medications[5]    Review of Systems:    CONSTITUTIONAL: denies fever, chills  ENT: denies sore throat, nasal drainage/congestion  CHEST/CVS: denies cough, sputum, trouble breathing/SOB, chest pain, LAMA  GI: denies abdominal pain, heartburn, diarrhea, blood in bm, tarry bm, constipation,    : denies difficulty urinating, pain, blood in urine, or frequency  SKIN: denies any unusual skin lesions or rashes  MUSCULOSKELETAL: denies back pain, joint pain, leg swelling  NEURO/EYES: denies headaches, passing out, motor dysfunction, difficulty walking, difficulty with speech, temporary blindness, double vision, confusion    Physical Exam:  /46 (BP Location: Right arm)   Pulse 61   Temp 97.5 °F (36.4 °C) (Oral)   Resp 16   Ht 5' 2\" (1.575 m)   Wt 117 lb (53.1 kg)   SpO2 100%   BMI 21.40 kg/m²   GENERAL: alert and orientated X 3, well developed, well nourished, in no apparent distress  HEENT: ears and throat are clear  NECK: supple, no lymphadenopathy,  thyroid wnl  CAROTID: No bruits  RESPIRATORY: no rales, rhonchi, or wheezes B  CARDIO: RRR without murmur, no murmur, no gallop   ABDOMEN: soft, non-tender with no palpable aneurysm or masses  BACK: normal, no tenderness  SKIN: no rashes, no suspicious lesions, warm and dry  EXTREMITIES: no edema, full range of motion, no tenderness  NEURO/PSYCH: orientated x3, normal mood and affect, no sensory or motor deficit    ARTERIAL VASCULAR EXAM    LOWER EXTREMITY     FEMORAL POPLITEAL POST TIB ANT TIB PERONEAL   RIGHT 3 2 2 2         LEFT 3 2 2 2             No ischemic changes concerning for wounds  No compartment syndrome    Laboratory Data:  Lab Results   Component Value Date    WBC 6.7 04/30/2025    HGB 8.5 04/30/2025    HCT 25.2 04/30/2025    .0 04/30/2025    CREATSERUM 2.77 05/01/2025    BUN 55 04/30/2025     04/30/2025    K 4.5 04/30/2025     04/30/2025    CO2 24.0 04/30/2025    GLU 99 04/30/2025    CA 9.5 04/30/2025    ALB 4.4 04/30/2025    ALKPHO 63 04/30/2025    BILT <0.2 04/30/2025    TP 6.9 04/30/2025    AST 11 04/30/2025    ALT 8 04/30/2025       Impression and Plan:    Left foot pain-I discussed the case with Dr. Christopher who suspects the patient has plantar fasciitis.  No need for vascular intervention at this time.  Vascular surgery will sign off.  Recommend podiatry evaluation as an outpatient for her foot pain and possible heel spur.     Thank you for allowing me to participate in the care of your patient.    Fernando Fernandez MD  5/1/2025  10:21 AM         [1]   Past Medical History:   Back pain    Diabetes (HCC)    Essential hypertension    High blood pressure    High cholesterol    Osteoarthritis    Pneumonia due to organism   [2] History reviewed. No pertinent surgical history.  [3] No family history on file.  [4]   Allergies  Allergen Reactions    Codeine NAUSEA ONLY     Nausea   [5]   Current Facility-Administered Medications:     amLODIPine (Norvasc) tab 5 mg, 5 mg, Oral, Daily     aspirin DR tab 81 mg, 81 mg, Oral, Daily    atorvastatin (Lipitor) tab 10 mg, 10 mg, Oral, Daily    DULoxetine (Cymbalta) DR cap 20 mg, 20 mg, Oral, Daily    gabapentin (Neurontin) cap 200 mg, 200 mg, Oral, BID    isosorbide mononitrate ER (Imdur) 24 hr tab 30 mg, 30 mg, Oral, Daily    acetaminophen (Tylenol Extra Strength) tab 500 mg, 500 mg, Oral, Q4H PRN    ondansetron (Zofran) 4 MG/2ML injection 4 mg, 4 mg, Intravenous, Q6H PRN    heparin (Porcine) 5000 UNIT/ML injection 5,000 Units, 5,000 Units, Subcutaneous, Q8H BRODY    traMADol (Ultram) tab 50 mg, 50 mg, Oral, Q12H PRN    polyethylene glycol (PEG 3350) (Miralax) 17 g oral packet 17 g, 17 g, Oral, BID    senna-docusate (Senokot-S) 8.6-50 MG per tab 2 tablet, 2 tablet, Oral, BID    bisacodyl (Dulcolax) 10 MG rectal suppository 10 mg, 10 mg, Rectal, Daily PRN    metoclopramide (Reglan) 5 mg/mL injection 2.5 mg, 2.5 mg, Intravenous, Q8H PRN    lidocaine-menthol 4-1 % patch 1 patch, 1 patch, Transdermal, Daily

## 2025-05-01 NOTE — PLAN OF CARE
ED admit 5/1 Lt foot pain, Pt is AAOX4, VSS, room air, W/C bound at baseline, plan for PT / OT eval and possible discharge today, will CTM.

## 2025-05-01 NOTE — ED INITIAL ASSESSMENT (HPI)
Patient here via EMS with c/o left foot pain, starting today.  Patient was given tramadol in NH and it did not help.  Denies injury.  Patient vomiting upon and reports that just started in the ambulance

## 2025-05-01 NOTE — CM/SW NOTE
Spoke with pt's RN who stated pt has been cleared for DC today. Pt's dtr to provide transport. RN requesting on-site therapy services at [facility]. Confirmed with PT that pt was a one person assist to transfer with them today.     Contacted K[facility] Knapp and spoke with RN Chidi. He confirmed pt can return to [facility] today. Informed him of request for on-site PT.     Updated RN. Order for on site PT placed on DC AVS. / to remain available for support and/or discharge planning.     Kermit Knapp  [phone]    Jazmin Asif [facility]  Discharge Planner  [phone]

## 2025-05-01 NOTE — ED PROVIDER NOTES
Patient Seen in: [facility] Emergency Department      History     Chief Complaint   Patient presents with    Foot Pain           Stated Complaint: Foot pain, N/V    Subjective:   HPI    89-year-old female history of diabetes presents to the ED with left foot pain along her arch for the last 2 days, arrives vomiting x1.  Daughter is at bedside and gives history also.  They both deny any trauma/injury.  She denies fevers, chills, chest pain, shortness of breath, abdominal pain, diarrhea.  History of Present Illness               Objective:     Past Medical History:    Back pain    Diabetes (HCC)    Essential hypertension    High blood pressure    High cholesterol    Osteoarthritis    Pneumonia due to organism              History reviewed. No pertinent surgical history.             Social History     Socioeconomic History    Marital status:    Tobacco Use    Smoking status: Never    Smokeless tobacco: Never   Vaping Use    Vaping status: Never Used   Substance and Sexual Activity    Alcohol use: Never    Drug use: Never   Other Topics Concern    Caffeine Concern Yes    Exercise No   Social History Narrative    The patient uses the following assistive device(s):  quad cane, rolling walker and wheelchair.      The patient does not live in a home with stairs.     Social Drivers of Health     Food Insecurity: No Food Insecurity (5/1/2025)    NCSS - Food Insecurity     Worried About Running Out of Food in the Last Year: No     Ran Out of Food in the Last Year: No   Transportation Needs: No Transportation Needs (5/1/2025)    NCSS - Transportation     Lack of Transportation: No   Housing Stability: Not At Risk (5/1/2025)    NCSS - Housing/Utilities     Has Housing: Yes     Worried About Losing Housing: No     Unable to Get Utilities: No                                Physical Exam     ED Triage Vitals   BP 04/30/25 2218 (!) 175/67   Pulse 04/30/25 2218 79   Resp 04/30/25 2218 18   Temp 04/30/25 2218 98.1 °F  (36.7 °C)   Temp src 04/30/25 2218 Oral   SpO2 04/30/25 2330 97 %   O2 Device 04/30/25 2218 None (Room air)       Current Vitals:   Vital Signs  BP: 134/57  Pulse: 67  Resp: 16  Temp: 97.6 °F (36.4 °C)  Temp src: Oral  MAP (mmHg): 81    Oxygen Therapy  SpO2: 100 %  O2 Device: None (Room air)  Pulse Oximetry Type: Continuous  Oximetry Probe Site Changed: No  Pulse Ox Probe Location: Left hand        Physical Exam  Vital signs reviewed.  Nursing note reviewed.  Constitutional: Alert, well-appearing  Head: Normocephalic, atraumatic  Mouth: Moist  Eyes: Extraocular muscles intact, pupils equal  Cardiovascular: Regular rate and rhythm  Pulmonary: Effort normal, breath sounds normal  Abdomen: Soft, nontender nondistended  Skin: Left arch of foot slightly tender to palpation.  Full range of left ankle motion.  Left foot warm.  DP pulse palpated.  Musculoskeletal range of motion grossly normal all extremities  Neuro: Alert, at baseline, no focal neuro deficit.  Moves all extremities against gravity  Psych: Mood normal                    ED Course     Labs Reviewed   COMP METABOLIC PANEL (14) - Abnormal; Notable for the following components:       Result Value    BUN 55 (*)     Creatinine 3.04 (*)     Calculated Osmolality 297 (*)     eGFR-Cr 14 (*)     ALT 8 (*)     Bilirubin, Total <0.2 (*)     All other components within normal limits   CBC WITH DIFFERENTIAL WITH PLATELET - Abnormal; Notable for the following components:    RBC 2.54 (*)     HGB 8.5 (*)     HCT 25.2 (*)     Monocyte Absolute 1.29 (*)     All other components within normal limits   CREATININE, SERUM - Abnormal; Notable for the following components:    Creatinine 2.77 (*)     eGFR-Cr 16 (*)     All other components within normal limits   TROPONIN I HIGH SENSITIVITY - Normal   URINALYSIS WITH CULTURE REFLEX   RAINBOW DRAW LAVENDER   RAINBOW DRAW LIGHT GREEN   BLOOD CULTURE   BLOOD CULTURE     EKG    Rate, intervals and axes as noted on EKG Report.  Rate:  83  Rhythm: Sinus Rhythm  Reading: Nonspecific ST-T wave abnormality              Results            XR ABDOMEN (1 VIEW) (CPT=74018)  Result Date: 5/1/2025  PROCEDURE:  XR ABDOMEN (1 VIEW) (CPT=74018)  INDICATIONS:  vomiting, diaphoretic  COMPARISON:  None.  TECHNIQUE:  Supine AP view was obtained.  PATIENT STATED HISTORY: (As transcribed by Technologist)  Patient offered no additional history at this time.               CONCLUSION:  There is a large amount of stool noted throughout the colon.  There is some gas-filled loops of mildly distended small bowel in the mid abdomen.  There is air in stool to the level the rectum.  Findings either represent constipation versus ileus. Multiple pelvic phleboliths are noted. There is a 9 mm calcification to the right of the L1-2 disc space either representing a gallstone or a renal calculus. Marked vascular calcification.  Marked degenerative change of the spine with levoscoliosis.  Status post right total hip arthroplasty.  Moderate degenerative change of the left hip joint. Mild elevation the right hemidiaphragm.  Mild cardiomegaly.   LOCATION:  [facility]   Dictated by (CST): Fernando Miles MD on 5/01/2025 at 0:02 AM     Finalized by (CST): Fernando Miles MD on 5/01/2025 at 0:03 AM       XR FOOT, COMPLETE (MIN 3 VIEWS), LEFT (CPT=73630)  Result Date: 4/30/2025  PROCEDURE:  XR FOOT, COMPLETE (MIN 3 VIEWS), LEFT (CPT=73630)  TECHNIQUE:  AP, oblique, and lateral views were obtained.  COMPARISON:  None.  INDICATIONS:  Foot pain, N/V  PATIENT STATED HISTORY: (As transcribed by Technologist)  Patient states no known injury to left foot. Severe pain along plantar aspect of left foot, beginning at the arch and radiating to left heel x1 day.              CONCLUSION:  Diffuse osteopenia.  Multiple level joint space narrowing specially in the midfoot.  Marked vascular calcification.  Hammertoe deformities noted.  Diffuse soft tissue swelling.  Calcaneal enthesophytes are noted.   There is some bony demineralization involving the inferior calcaneus at the level of the plantar spur and involving the talar navicular joint space of uncertain significance.  There is an erosion involving the base of the left 5th metatarsal with subchondral cyst versus small erosions in the midfoot. No acute fracture.   LOCATION:  [facility]   Dictated by (CST): Fernando Miles MD on 2025 at 11:47 PM     Finalized by (CST): Fernando Miles MD on 2025 at 11:49 PM                            [facility]      Medical Decision Making:    Differential diagnosis before testing includes osteomyelitis, gout, unlikely arterial occlusion as her foot is warm and 2+ DP pulse potential life threatening diagnosis which is a medical condition that poses a threat to life/function.     Comorbidities that add complexity to management: Diabetes    I reviewed prior external ED notes including reviewed prior notes of hypertensive chronic kidney disease, acute non-ST segment MI 3/20/2025    Discussions of management with: Discussed with hospitalist, Dr. Carbone    Social determinants of health care: Daughter at bedside    I personally reviewed the radiographs and my independent interpretation bony demineralization of inferior calcaneus, plantar spur and talonavicular joint    Shared decision makin-year-old female history of diabetes arrives to ED in distress, diaphoretic complaining of vomiting with exquisitely tender left foot.  Patient required at least 3 rounds of pain meds including fentanyl and morphine.  Foot warm, DP pulse intact so highly doubt vascular occlusion.  Given erosions on fifth metatarsal on x-ray and exquisitely tender foot with no history of trauma/injury, will obtain MRI foot to exclude osteomyelitis. Empiric IV Vanco ordered. no leukocytosis, creatinine 3.04 (near baseline, 2.25 ).  Abdominal x-ray constipation versus ileus.  Patient unable to provide urine sample.    Admission disposition: 2025   1:10 AM           Medical Decision Making      Disposition and Plan     Clinical Impression:  1. Left foot pain    2. Nausea and vomiting, unspecified vomiting type         Disposition:  Admit  5/1/2025  1:10 am    Follow-up:  No follow-up provider specified.        Medications Prescribed:  Current Discharge Medication List          Supplementary Documentation:         Hospital Problems       Present on Admission  Date Reviewed: 3/1/2025          ICD-10-CM Noted POA    * (Principal) Left foot pain M79.672 5/1/2025 Unknown    Nausea and vomiting, unspecified vomiting type R11.2 5/1/2025 Unknown

## 2025-05-01 NOTE — DISCHARGE INSTRUCTIONS
You can use topical pain medications such as lidocaine on your left foot    You have plantar fascitis     Please schedule an appointment to see a podiatrist     Your imaging showed constipation. Take miralax daily and use senna-docusate daily as needed. If you do not have a solid bowel movement at least every other day, use a dulcolax suppository     PT/OT eval and treat with on site therapy provider at [facility]

## 2025-05-01 NOTE — PHYSICAL THERAPY NOTE
PHYSICAL THERAPY EVALUATION - INPATIENT     Room Number: 358/358-A  Evaluation Date: 5/1/2025  Type of Evaluation: Initial  Physician Order: PT Eval and Treat    Presenting Problem: L foot pain, possible plantar fasciitis  Co-Morbidities : congestive heart failure, chronic pain, polyneuropathy, spondylosis, DM2, breast ca, R ROSALIA  Reason for Therapy: Mobility Dysfunction and Discharge Planning    PHYSICAL THERAPY ASSESSMENT   Patient is a 89 year old female admitted 4/30/2025 for L foot pain, possible plantar fasciitis.  Prior to admission, patient's baseline is pivot transfers bed<>chair.  Patient is currently functioning near baseline with bed mobility and transfers.  Patient is requiring contact guard assist as a result of the following impairments: decreased functional strength, decreased endurance/aerobic capacity, impaired static and dynamic balance, impaired motor planning, and decreased muscular endurance.  Physical Therapy will continue to follow for duration of hospitalization.    Patient will benefit from continued skilled PT Services at discharge to promote prior level of function and safety with additional support and return home with home health PT.    PLAN DURING HOSPITALIZATION  Nursing Mobility Recommendation : 1 Assist     PT Treatment Plan: Bed mobility, Body mechanics, Endurance, Energy conservation, Patient education, Gait training, Family education, Balance training, Transfer training, Strengthening  Rehab Potential : Fair  Frequency (Obs): 3x/week     CURRENT GOALS    Goal #1 Patient is able to demonstrate supine - sit EOB @ level: supervision     Goal #2 Patient is able to demonstrate transfers EOB to/from Chair/Wheelchair at assistance level: supervision     Goal #3    Goal #4    Goal #5    Goal #6    Goal Comments: Goals established on 5/1/2025      PHYSICAL THERAPY MEDICAL/SOCIAL HISTORY  History related to current admission: Patient is a 89 year old female admitted on 4/30/2025 from home  for L foot pain.  Pt diagnosed with possible plantar fasciitis.    HOME SITUATION  Type of Home: Assisted living facility ([facility])                        Lives With: Staff 24 hours               Prior Level of [place]: Pt typically performs pivot transfers to/from her bed<>wc. Usually able to perform by herself, sometimes requires assist.     SUBJECTIVE  \"Usually this is easier\"    OBJECTIVE  Precautions: Bed/chair alarm  Fall Risk: High fall risk    WEIGHT BEARING RESTRICTION     PAIN ASSESSMENT  Ratin          COGNITION  Overall Cognitive Status:  WFL - within functional limits    RANGE OF MOTION AND STRENGTH ASSESSMENT    Lower extremity ROM is within functional limits      Lower extremity strength is grossly 4/5     BALANCE  Static Sitting: Fair +  Dynamic Sitting: Fair  Static Standing: Poor +  Dynamic Standing: Poor +    ADDITIONAL TESTS                                    ACTIVITY TOLERANCE                         O2 WALK       NEUROLOGICAL FINDINGS                        AM-PAC '6-Clicks' INPATIENT SHORT FORM - BASIC MOBILITY  How much difficulty does the patient currently have...  Patient Difficulty: Turning over in bed (including adjusting bedclothes, sheets and blankets)?: A Little   Patient Difficulty: Sitting down on and standing up from a chair with arms (e.g., wheelchair, bedside commode, etc.): A Little   Patient Difficulty: Moving from lying on back to sitting on the side of the bed?: A Little   How much help from another person does the patient currently need...   Help from Another: Moving to and from a bed to a chair (including a wheelchair)?: A Little   Help from Another: Need to walk in hospital room?: A Lot (non amb at baseline)   Help from Another: Climbing 3-5 steps with a railing?: A Lot     AM-PAC Score:  Raw Score: 16   Approx Degree of Impairment: 54.16%   Standardized Score (AM-PAC Scale): 40.78   CMS Modifier (G-Code): CK    FUNCTIONAL ABILITY STATUS  Gait Assessment    Functional Mobility/Gait Assessment  Gait Assistance: Not tested (non amb at baseline)    Skilled Therapy Provided     Bed Mobility:  Rolling: NT  Supine to sit: [place]   Sit to supine: NT     Transfer Mobility:  Pivot: CGA-[place]    Therapist's Comments: RN cleared for session. Pt agreeable for therapy, received supine. Dtr present for session. Pt trained on proper UE/LE placement during pivot transfer. Performed bed>chair>bed. Instructed to call for nursing staff for any needs and OOB mobility.     Exercise/Education Provided:  Bed mobility  Body mechanics  Energy conservation  Functional activity tolerated  Gait training  Posture  Strengthening  Transfer training    Patient End of Session: In bed, Needs met, Call light within reach, RN aware of session/findings, All patient questions and concerns addressed, Hospital anti-slip socks, Alarm set, Family present, Discussed recommendations with /      Patient Evaluation Complexity Level:  History High - 3 or more personal factors and/or co-morbidities   Examination of body systems Low -  addressing 1-2 elements   Clinical Presentation Low- Stable   Clinical Decision Making Low Complexity       PT Session Time: 20 minutes  Therapeutic Activity: 10 minutes

## 2025-05-01 NOTE — H&P
[facility]   part of [facility]    History & Physical    Shi Dhillon Patient Status:  Observation    1935 MRN [id number]   Location [facility] 3SW-A Attending Raf, Radha Monge MD   Hosp Day # 0 PCP Breonna Cabral MD     Date:  2025  Date of Admission:  2025    Chief Complaint:     Chief Complaint   Patient presents with    Foot Pain             HPI:   Shi Dhillon is a(n) 89 year old female w/ multiple medical problems including chronic immobility, wheelchair bound at baseline, chronic HFpEF, CKD4, eHTN, DM2 w/ neuropathy, who presented w/ left foot pains.     She also c/o nausea, vomiting, she had imaging which demonstrated constipation, she started moving bowels and tolerating diet. She has had left foot pains, mid foot plantar surface, no erythema, no wounds, she was eval by ed md who had consulted vascular surgery; however, pulses palpable, warm foot, she had left foot MRI which showed plantar fascitis and edema possible cellulitis.     Her foot pains are improving, she has no erythema, no fevers, chills.     History   Past Medical History[1]  Past Surgical History[2]  Family History[3]  Social History:  Short Social Hx on File[4]    Allergies/Medications:   Allergies: Allergies[5]  Prescriptions Prior to Admission[6]    Review of Systems:   A comprehensive 12 point review of systems was otherwise negative, aside from what's stated above.    Physical Exam:   Vital Signs:  Blood pressure 149/46, pulse 61, temperature 97.5 °F (36.4 °C), temperature source Oral, resp. rate 16, height 5' 2\" (1.575 m), weight 117 lb (53.1 kg), SpO2 100%.     General: No acute distress. Alert and oriented x 3.  HEENT: Moist mucous membranes. EOM-I. PERRL  Neck: No lymphadenopathy.  No JVD. No carotid bruits.  Respiratory: CTAB, no wheezing   Cardiovascular: RRR, no murmurs   Abdomen: Soft, nontender, nondistended.  Positive bowel sounds. No rebound tenderness  Neurologic: No focal  neurological deficits.  Musculoskeletal: left foot plantar pains, but no erythema, no wounds  Integument: No lesions. No erythema.  Psychiatric: Appropriate mood and affect.    Results:     Lab Results   Component Value Date    WBC 6.7 04/30/2025    HGB 8.5 (L) 04/30/2025    HCT 25.2 (L) 04/30/2025    .0 04/30/2025    CREATSERUM 2.77 (H) 05/01/2025    BUN 55 (H) 04/30/2025     04/30/2025    K 4.5 04/30/2025     04/30/2025    CO2 24.0 04/30/2025    GLU 99 04/30/2025    CA 9.5 04/30/2025    ALB 4.4 04/30/2025    ALKPHO 63 04/30/2025    BILT <0.2 (L) 04/30/2025    TP 6.9 04/30/2025    AST 11 04/30/2025    ALT 8 (L) 04/30/2025    MG 1.9 03/07/2024            MRI FOOT (W+WO), LEFT (CPT=73720)  Result Date: 5/1/2025  CONCLUSION:  Mild subcutaneous edema of the forefoot.  No drainable fluid collection.  Thickening and enhancement of the plantar fat pad and plantar fascia suggestive of cellulitis versus plantar fasciitis.  No abnormal marrow signal or cortical destructive process to suggest osteomyelitis.  Arthritis in the visualized forefoot and midfoot.   LOCATION:  [facility]   Dictated by (CST): Halle Frost MD on 5/01/2025 at 8:07 AM     Finalized by (CST): Halle Frost MD on 5/01/2025 at 8:09 AM       XR ABDOMEN (1 VIEW) (CPT=74018)  Result Date: 5/1/2025  CONCLUSION:  There is a large amount of stool noted throughout the colon.  There is some gas-filled loops of mildly distended small bowel in the mid abdomen.  There is air in stool to the level the rectum.  Findings either represent constipation versus ileus. Multiple pelvic phleboliths are noted. There is a 9 mm calcification to the right of the L1-2 disc space either representing a gallstone or a renal calculus. Marked vascular calcification.  Marked degenerative change of the spine with levoscoliosis.  Status post right total hip arthroplasty.  Moderate degenerative change of the left hip joint. Mild elevation the right hemidiaphragm.  Mild  cardiomegaly.   LOCATION:  [facility]   Dictated by (CST): Fernando Miles MD on 5/01/2025 at 0:02 AM     Finalized by (CST): Fernando Miles MD on 5/01/2025 at 0:03 AM       XR FOOT, COMPLETE (MIN 3 VIEWS), LEFT (CPT=73630)  Result Date: 4/30/2025  CONCLUSION:  Diffuse osteopenia.  Multiple level joint space narrowing specially in the midfoot.  Marked vascular calcification.  Hammertoe deformities noted.  Diffuse soft tissue swelling.  Calcaneal enthesophytes are noted.  There is some bony demineralization involving the inferior calcaneus at the level of the plantar spur and involving the talar navicular joint space of uncertain significance.  There is an erosion involving the base of the left 5th metatarsal with subchondral cyst versus small erosions in the midfoot. No acute fracture.   LOCATION:  [facility]   Dictated by (CST): Fernando Miles MD on 4/30/2025 at 11:47 PM     Finalized by (CST): Fernando Miles MD on 4/30/2025 at 11:49 PM       EKG  Result Date: 5/1/2025  Normal sinus rhythm Nonspecific ST and T wave abnormality Abnormal ECG When compared with ECG of 04-MAR-2024 18:27, No significant change was found      Assessment/Plan:     Shi Dhillon is a(n) 89 year old female w/ multiple medical problems including chronic immobility, wheelchair bound at baseline, chronic HFpEF, CKD4, eHTN, DM2 w/ neuropathy, who presented w/ left foot pains.     Left foot pains 2/2 plantar fascitis   - topical pain relievers w/ lidoderm   - hold off on nsaids given ckd   - vascular consulted, d/w Dr. Fernandez, good pulses, okay for dc w/ op pods follow up   - can consider steroid injection if pods agrees, will hold off on systemic given dm2  - pt/ot     Nausea w/ vomiting  Constipation   - n/v likely 2/2 severe constipation vs ileus related to immobility   - start bowel regimen     Ckd4  - creat near baseline, slightly higher likely 2/2 vomiting, improving w/ iv fluids  - can dc home w/ resumption of home meds and op  follow up with nephrology     HFpEF  eHTN  HLD  - cont home meds    DM2 w/ neuropathy   - resume home meds at dc     DVT ppx: heparin  Code Status: - deferred conversation - pt also asked for me to not reach out to family     Dispo: dc back to assisted living facility today     Bryant Christopher, DO [facility] Hospitalist                 [1]   Past Medical History:   Back pain    Diabetes (HCC)    Essential hypertension    High blood pressure    High cholesterol    Osteoarthritis    Pneumonia due to organism   [2] History reviewed. No pertinent surgical history.  [3] No family history on file.  [4]   Social History  Socioeconomic History    Marital status:    Tobacco Use    Smoking status: Never    Smokeless tobacco: Never   Vaping Use    Vaping status: Never Used   Substance and Sexual Activity    Alcohol use: Never    Drug use: Never   Other Topics Concern    Caffeine Concern Yes    Exercise No   Social History Narrative    The patient uses the following assistive device(s):  quad cane, rolling walker and wheelchair.      The patient does not live in a home with stairs.     Social Drivers of Health     Food Insecurity: No Food Insecurity (5/1/2025)    NCSS - Food Insecurity     Worried About Running Out of Food in the Last Year: No     Ran Out of Food in the Last Year: No   Transportation Needs: No Transportation Needs (5/1/2025)    NCSS - Transportation     Lack of Transportation: No   Housing Stability: Not At Risk (5/1/2025)    NCSS - Housing/Utilities     Has Housing: Yes     Worried About Losing Housing: No     Unable to Get Utilities: No   [5]   Allergies  Allergen Reactions    Codeine NAUSEA ONLY     Nausea   [6]   Medications Prior to Admission   Medication Sig    saccharomyces boulardii 250 MG Oral Cap Take 1 capsule (250 mg total) by mouth 2 (two) times daily.    cholecalciferol 50 MCG (2000 UT) Oral Cap Take 2 capsules (4,000 Units total) by mouth daily.    cyanocobalamin 1000 MCG Oral Tab Take 1 tablet  (1,000 mcg total) by mouth daily.    amLODIPine 5 MG Oral Tab Take 1 tablet (5 mg total) by mouth daily.    Ferrous Fumarate 325 (106 Fe) MG Oral Tab Take 325 mg by mouth daily.    Insulin Lispro, 0.5 Unit Dial, (HUMALOG KAMRAN KWIKPEN) 100 UNIT/ML Subcutaneous Solution Pen-injector Inject 0-6 Units into the skin in the morning and 0-6 Units before bedtime. Below 200 = 0 units  201-250 = 2 units  251-300 = 4 units  301-350 = 6 units  351-400 = 8 units  Call MD if blood sugar > 350.    ondansetron (ZOFRAN) 4 mg tablet Take 1 tablet (4 mg total) by mouth every 8 (eight) hours as needed for Nausea.    empagliflozin (JARDIANCE) 10 MG Oral Tab Take by mouth daily.    traMADol 50 MG Oral Tab Take 1 tablet (50 mg total) by mouth every 6 (six) hours as needed for Pain.    bumetanide 2 MG Oral Tab     DULoxetine 20 MG Oral Cap DR Particles     hydrALAZINE 100 MG Oral Tab Take 1 tablet (100 mg total) by mouth 3 (three) times daily.    meclizine 25 MG Oral Tab Take 1 tablet (25 mg total) by mouth As Directed.    gabapentin 100 MG Oral Cap Take 1 capsule (100 mg total) by mouth nightly.    isosorbide mononitrate ER 30 MG Oral Tablet 24 Hr Take 1 tablet (30 mg total) by mouth in the morning.    acetaminophen 325 MG Oral Tab Take 2 tablets (650 mg total) by mouth in the morning and 2 tablets (650 mg total) before bedtime.    carvedilol 12.5 MG Oral Tab Take 1 tablet (12.5 mg total) by mouth in the morning and 1 tablet (12.5 mg total) in the evening. Take with meals.    glimepiride 4 MG Oral Tab Take 1.5 mg by mouth in the morning.    atorvastatin 10 MG Oral Tab Take 1 tablet (10 mg total) by mouth in the morning.    aspirin 81 MG Oral Tab EC Take 1 tablet (81 mg total) by mouth in the morning.    potassium chloride (KLOR-CON M10) 10 MEQ Oral Tab CR 2 tablets (20 mEq total).    magnesium 250 MG Oral Tab 2 [id number] with a meal Orally twice a day    [] buprenorphine (BUTRANS) 5 MCG/HR Transdermal Patch Weekly Place 1 patch  onto the skin once a week for 28 days.    furosemide 40 MG Oral Tab Take 1 tablet (40 mg total) by mouth daily.

## 2025-05-01 NOTE — PHYSICAL THERAPY NOTE
Order received, chart reviewed. Pt declining to work with therapy at this time, reports too tired. Will hold and follow as appropriate.     Giselle Medrano, PT, DPT  05/01/25

## 2025-05-28 ENCOUNTER — HOSPITAL ENCOUNTER (EMERGENCY)
Facility: HOSPITAL | Age: 89
Discharge: HOME | End: 2025-05-28
Attending: EMERGENCY MEDICINE
Payer: MEDICARE

## 2025-05-28 VITALS
DIASTOLIC BLOOD PRESSURE: 59 MMHG | BODY MASS INDEX: 21 KG/M2 | HEART RATE: 62 BPM | OXYGEN SATURATION: 100 % | TEMPERATURE: 97 F | WEIGHT: 117 LBS | RESPIRATION RATE: 19 BRPM | SYSTOLIC BLOOD PRESSURE: 145 MMHG

## 2025-05-28 DIAGNOSIS — D63.1 ANEMIA ASSOCIATED WITH CHRONIC RENAL FAILURE: ICD-10-CM

## 2025-05-28 DIAGNOSIS — N18.9 ANEMIA ASSOCIATED WITH CHRONIC RENAL FAILURE: ICD-10-CM

## 2025-05-28 DIAGNOSIS — N18.4 CKD (CHRONIC KIDNEY DISEASE) STAGE 4, GFR 15-29 ML/MIN (HCC): Primary | ICD-10-CM

## 2025-05-28 LAB
ALBUMIN SERPL-MCNC: 4.1 G/DL (ref 3.2–4.8)
ALBUMIN/GLOB SERPL: 1.8 {RATIO} (ref 1–2)
ALP LIVER SERPL-CCNC: 67 U/L (ref 55–142)
ALT SERPL-CCNC: <7 U/L (ref 10–49)
ANION GAP SERPL CALC-SCNC: 11 MMOL/L (ref 0–18)
AST SERPL-CCNC: 15 U/L (ref ?–34)
BASOPHILS # BLD AUTO: 0.02 X10(3) UL (ref 0–0.2)
BASOPHILS NFR BLD AUTO: 0.4 %
BILIRUB SERPL-MCNC: <0.2 MG/DL (ref 0.2–1.1)
BUN BLD-MCNC: 46 MG/DL (ref 9–23)
CALCIUM BLD-MCNC: 9.2 MG/DL (ref 8.7–10.6)
CHLORIDE SERPL-SCNC: 100 MMOL/L (ref 98–112)
CO2 SERPL-SCNC: 24 MMOL/L (ref 21–32)
CREAT BLD-MCNC: 2.68 MG/DL (ref 0.55–1.02)
EGFRCR SERPLBLD CKD-EPI 2021: 17 ML/MIN/1.73M2 (ref 60–?)
EOSINOPHIL # BLD AUTO: 0.07 X10(3) UL (ref 0–0.7)
EOSINOPHIL NFR BLD AUTO: 1.3 %
ERYTHROCYTE [DISTWIDTH] IN BLOOD BY AUTOMATED COUNT: 12.6 %
GLOBULIN PLAS-MCNC: 2.3 G/DL (ref 2–3.5)
GLUCOSE BLD-MCNC: 176 MG/DL (ref 70–99)
HCT VFR BLD AUTO: 25.4 % (ref 35–48)
HGB BLD-MCNC: 8.4 G/DL (ref 12–16)
IMM GRANULOCYTES # BLD AUTO: 0.01 X10(3) UL (ref 0–1)
IMM GRANULOCYTES NFR BLD: 0.2 %
LYMPHOCYTES # BLD AUTO: 1.5 X10(3) UL (ref 1–4)
LYMPHOCYTES NFR BLD AUTO: 27.2 %
MCH RBC QN AUTO: 33.9 PG (ref 26–34)
MCHC RBC AUTO-ENTMCNC: 33.1 G/DL (ref 31–37)
MCV RBC AUTO: 102.4 FL (ref 80–100)
MONOCYTES # BLD AUTO: 0.84 X10(3) UL (ref 0.1–1)
MONOCYTES NFR BLD AUTO: 15.2 %
NEUTROPHILS # BLD AUTO: 3.07 X10 (3) UL (ref 1.5–7.7)
NEUTROPHILS # BLD AUTO: 3.07 X10(3) UL (ref 1.5–7.7)
NEUTROPHILS NFR BLD AUTO: 55.7 %
OSMOLALITY SERPL CALC.SUM OF ELEC: 296 MOSM/KG (ref 275–295)
PLATELET # BLD AUTO: 208 10(3)UL (ref 150–450)
PLATELETS.RETICULATED NFR BLD AUTO: 0.6 % (ref 0–7)
POTASSIUM SERPL-SCNC: 4.3 MMOL/L (ref 3.5–5.1)
PROT SERPL-MCNC: 6.4 G/DL (ref 5.7–8.2)
RBC # BLD AUTO: 2.48 X10(6)UL (ref 3.8–5.3)
SODIUM SERPL-SCNC: 135 MMOL/L (ref 136–145)
WBC # BLD AUTO: 5.5 X10(3) UL (ref 4–11)

## 2025-05-28 PROCEDURE — 85025 COMPLETE CBC W/AUTO DIFF WBC: CPT | Performed by: EMERGENCY MEDICINE

## 2025-05-28 PROCEDURE — 99283 EMERGENCY DEPT VISIT LOW MDM: CPT

## 2025-05-28 PROCEDURE — 80053 COMPREHEN METABOLIC PANEL: CPT | Performed by: EMERGENCY MEDICINE

## 2025-05-28 PROCEDURE — 36415 COLL VENOUS BLD VENIPUNCTURE: CPT

## 2025-05-28 NOTE — ED PROVIDER NOTES
Patient Seen in: [facility] Emergency Department      History     Chief Complaint   Patient presents with    Abnormal Labs     Stated Complaint: abnormal labs    Subjective:     HPI    89-year-old male with insulin-dependent diabetes (glimepiride, Jardiance), hypertension (hydralazine), CKD and diastolic dysfunction (Bumex)   Who reports feeling tired and weak, with a sensation of the head feeling large but not painful. There is no swelling in the legs.     Objective:   Past Medical History:    Back pain    Diabetes (HCC)    Essential hypertension    High blood pressure    High cholesterol    Osteoarthritis    Pneumonia due to organism              History reviewed. No pertinent surgical history.             No pertinent social history.            Review of Systems    Positive for stated complaint: abnormal labs  Other systems are as noted in HPI.  Constitutional and vital signs reviewed.      All other systems reviewed and negative except as noted above.    Physical Exam     ED Triage Vitals   BP 05/28/25 1250 152/65   Pulse 05/28/25 1252 73   Resp 05/28/25 1250 20   Temp 05/28/25 1250 97.3 °F (36.3 °C)   Temp src 05/28/25 1250 Temporal   SpO2 05/28/25 1252 100 %   O2 Device 05/28/25 1252 None (Room air)       Current:/59   Pulse 62   Temp 97.3 °F (36.3 °C) (Temporal)   Resp 19   Wt 53.1 kg   SpO2 100%   BMI 21.40 kg/m²     General:  Vitals as listed.  No acute distress   HEENT: Sclerae anicteric.  Conjunctivae show no pallor.  Oropharynx clear, mucous membranes moist   Lungs: good air exchange and clear   Heart: regular rate rhythm and no murmur   Abdomen: Soft and nontender.  No abdominal masses.  No peritoneal signs   Extremities: no edema, normal peripheral pulses   Neuro: Alert oriented and nonfocal      ED Course     Labs Reviewed   CBC WITH DIFFERENTIAL WITH PLATELET - Abnormal; Notable for the following components:       Result Value    RBC 2.48 (*)     HGB 8.4 (*)     HCT 25.4 (*)      .4 (*)     All other components within normal limits   COMP METABOLIC PANEL (14) - Abnormal; Notable for the following components:    Glucose 176 (*)     Sodium 135 (*)     BUN 46 (*)     Creatinine 2.68 (*)     Calculated Osmolality 296 (*)     eGFR-Cr 17 (*)     ALT <7 (*)     Bilirubin, Total <0.2 (*)     All other components within normal limits   RAINBOW DRAW LAVENDER   RAINBOW DRAW LIGHT GREEN     ED COURSE and MDM     Sources of the medical history included the patient and his daughter    I reviewed prior external notes including echocardiogram done 3/5/2024 showed an ejection fraction of 50 to 55% and grade 2 diastolic dysfunction    To follow-up with his nephrologist and primary care physician    I have discussed with the patient the results of testing, differential diagnosis, and treatment plan. They expressed clear understanding of these instructions and agrees to the plan provided.    Disposition and Plan     Clinical Impression:  1. CKD (chronic kidney disease) stage 4, GFR 15-29 ml/min (HCC)    2. Anemia associated with chronic renal failure         Disposition:  Discharge  5/28/2025  7:18 pm    Follow-up:  Breonna Cabral MD  [address] [facility] [address]  [phone]    Schedule an appointment as soon as possible for a visit in 1 week(s)  And see your nephrologist (kidney doctor) as soon as possible        Medications Prescribed:  Discharge Medication List as of 5/28/2025  7:35 PM

## 2025-05-28 NOTE — ED QUICK NOTES
Waiting room Rounding Completed    Plan of Care reviewed. Waiting for er bed assignment.  Elimination needs assessed.  Provided comfort care.  Family in waiting room with patient

## 2025-06-16 ENCOUNTER — CLINICAL ABSTRACT (OUTPATIENT)
Dept: INFUSION THERAPY | Age: 89
End: 2025-06-16

## 2025-06-16 DIAGNOSIS — D63.8 ANEMIA OF CHRONIC DISEASE: Primary | ICD-10-CM

## 2025-06-16 DIAGNOSIS — N18.32 STAGE 3B CHRONIC KIDNEY DISEASE  (CMD): ICD-10-CM

## 2025-06-21 ENCOUNTER — APPOINTMENT (OUTPATIENT)
Dept: CT IMAGING | Facility: HOSPITAL | Age: 89
End: 2025-06-21
Attending: STUDENT IN AN ORGANIZED HEALTH CARE EDUCATION/TRAINING PROGRAM
Payer: MEDICARE

## 2025-06-21 ENCOUNTER — HOSPITAL ENCOUNTER (INPATIENT)
Facility: HOSPITAL | Age: 89
LOS: 7 days | Discharge: HOSPICE | End: 2025-06-28
Attending: STUDENT IN AN ORGANIZED HEALTH CARE EDUCATION/TRAINING PROGRAM | Admitting: STUDENT IN AN ORGANIZED HEALTH CARE EDUCATION/TRAINING PROGRAM
Payer: MEDICARE

## 2025-06-21 DIAGNOSIS — K92.2 GASTROINTESTINAL HEMORRHAGE, UNSPECIFIED GASTROINTESTINAL HEMORRHAGE TYPE: Primary | ICD-10-CM

## 2025-06-21 DIAGNOSIS — D64.9 ANEMIA, UNSPECIFIED TYPE: ICD-10-CM

## 2025-06-21 LAB
ALBUMIN SERPL-MCNC: 3.4 G/DL (ref 3.2–4.8)
ALBUMIN/GLOB SERPL: 1.9 {RATIO} (ref 1–2)
ALP LIVER SERPL-CCNC: 53 U/L (ref 55–142)
ALT SERPL-CCNC: 8 U/L (ref 10–49)
ANION GAP SERPL CALC-SCNC: 14 MMOL/L (ref 0–18)
ANTIBODY SCREEN: NEGATIVE
AST SERPL-CCNC: 10 U/L (ref ?–34)
BASOPHILS # BLD AUTO: 0.02 X10(3) UL (ref 0–0.2)
BASOPHILS NFR BLD AUTO: 0.2 %
BILIRUB SERPL-MCNC: <0.2 MG/DL (ref 0.2–1.1)
BUN BLD-MCNC: 77 MG/DL (ref 9–23)
CALCIUM BLD-MCNC: 8.4 MG/DL (ref 8.7–10.6)
CHLORIDE SERPL-SCNC: 99 MMOL/L (ref 98–112)
CO2 SERPL-SCNC: 22 MMOL/L (ref 21–32)
CREAT BLD-MCNC: 2.93 MG/DL (ref 0.55–1.02)
EGFRCR SERPLBLD CKD-EPI 2021: 15 ML/MIN/1.73M2 (ref 60–?)
EOSINOPHIL # BLD AUTO: 0.02 X10(3) UL (ref 0–0.7)
EOSINOPHIL NFR BLD AUTO: 0.2 %
ERYTHROCYTE [DISTWIDTH] IN BLOOD BY AUTOMATED COUNT: 12.9 %
GLOBULIN PLAS-MCNC: 1.8 G/DL (ref 2–3.5)
GLUCOSE BLD-MCNC: 182 MG/DL (ref 70–99)
GLUCOSE BLD-MCNC: 194 MG/DL (ref 70–99)
GLUCOSE BLD-MCNC: 335 MG/DL (ref 70–99)
HCT VFR BLD AUTO: 14.4 % (ref 35–48)
HGB BLD-MCNC: 4.8 G/DL (ref 12–16)
HGB BLD-MCNC: 7.8 G/DL (ref 12–16)
HGB BLD-MCNC: 8.9 G/DL (ref 12–16)
IMM GRANULOCYTES # BLD AUTO: 0.06 X10(3) UL (ref 0–1)
IMM GRANULOCYTES NFR BLD: 0.5 %
LIPASE SERPL-CCNC: 45 U/L (ref 12–53)
LYMPHOCYTES # BLD AUTO: 1.58 X10(3) UL (ref 1–4)
LYMPHOCYTES NFR BLD AUTO: 14.5 %
MCH RBC QN AUTO: 34.3 PG (ref 26–34)
MCHC RBC AUTO-ENTMCNC: 33.3 G/DL (ref 31–37)
MCV RBC AUTO: 102.9 FL (ref 80–100)
MONOCYTES # BLD AUTO: 0.61 X10(3) UL (ref 0.1–1)
MONOCYTES NFR BLD AUTO: 5.6 %
NEUTROPHILS # BLD AUTO: 8.63 X10 (3) UL (ref 1.5–7.7)
NEUTROPHILS # BLD AUTO: 8.63 X10(3) UL (ref 1.5–7.7)
NEUTROPHILS NFR BLD AUTO: 79 %
OSMOLALITY SERPL CALC.SUM OF ELEC: 316 MOSM/KG (ref 275–295)
PLATELET # BLD AUTO: 231 10(3)UL (ref 150–450)
POTASSIUM SERPL-SCNC: 4.4 MMOL/L (ref 3.5–5.1)
PROT SERPL-MCNC: 5.2 G/DL (ref 5.7–8.2)
RBC # BLD AUTO: 1.4 X10(6)UL (ref 3.8–5.3)
RH BLOOD TYPE: POSITIVE
RH BLOOD TYPE: POSITIVE
SODIUM SERPL-SCNC: 135 MMOL/L (ref 136–145)
WBC # BLD AUTO: 10.9 X10(3) UL (ref 4–11)

## 2025-06-21 PROCEDURE — 30233N1 TRANSFUSION OF NONAUTOLOGOUS RED BLOOD CELLS INTO PERIPHERAL VEIN, PERCUTANEOUS APPROACH: ICD-10-PCS | Performed by: STUDENT IN AN ORGANIZED HEALTH CARE EDUCATION/TRAINING PROGRAM

## 2025-06-21 PROCEDURE — 74176 CT ABD & PELVIS W/O CONTRAST: CPT | Performed by: STUDENT IN AN ORGANIZED HEALTH CARE EDUCATION/TRAINING PROGRAM

## 2025-06-21 RX ORDER — METOCLOPRAMIDE HYDROCHLORIDE 5 MG/ML
10 INJECTION INTRAMUSCULAR; INTRAVENOUS ONCE
Status: CANCELLED | OUTPATIENT
Start: 2025-06-21 | End: 2025-06-21

## 2025-06-21 RX ORDER — DIPHENHYDRAMINE HYDROCHLORIDE 50 MG/ML
12.5 INJECTION, SOLUTION INTRAMUSCULAR; INTRAVENOUS ONCE
Status: COMPLETED | OUTPATIENT
Start: 2025-06-21 | End: 2025-06-21

## 2025-06-21 RX ORDER — METOCLOPRAMIDE HYDROCHLORIDE 5 MG/ML
INJECTION INTRAMUSCULAR; INTRAVENOUS
Status: COMPLETED
Start: 2025-06-21 | End: 2025-06-21

## 2025-06-21 RX ORDER — METRONIDAZOLE 500 MG/100ML
500 INJECTION, SOLUTION INTRAVENOUS EVERY 12 HOURS
Status: COMPLETED | OUTPATIENT
Start: 2025-06-21 | End: 2025-06-22

## 2025-06-21 RX ORDER — DIPHENHYDRAMINE HYDROCHLORIDE 50 MG/ML
12.5 INJECTION, SOLUTION INTRAMUSCULAR; INTRAVENOUS ONCE
Status: CANCELLED | OUTPATIENT
Start: 2025-06-21 | End: 2025-06-21

## 2025-06-21 RX ORDER — METRONIDAZOLE 500 MG/1
500 TABLET ORAL 2 TIMES DAILY
Status: DISCONTINUED | OUTPATIENT
Start: 2025-06-21 | End: 2025-06-21

## 2025-06-21 RX ORDER — ONDANSETRON 2 MG/ML
4 INJECTION INTRAMUSCULAR; INTRAVENOUS ONCE
Status: DISCONTINUED | OUTPATIENT
Start: 2025-06-21 | End: 2025-06-21

## 2025-06-21 RX ORDER — DIPHENHYDRAMINE HYDROCHLORIDE 50 MG/ML
INJECTION, SOLUTION INTRAMUSCULAR; INTRAVENOUS
Status: COMPLETED
Start: 2025-06-21 | End: 2025-06-21

## 2025-06-21 RX ORDER — METOCLOPRAMIDE HYDROCHLORIDE 5 MG/ML
10 INJECTION INTRAMUSCULAR; INTRAVENOUS ONCE
Status: COMPLETED | OUTPATIENT
Start: 2025-06-21 | End: 2025-06-21

## 2025-06-21 RX ORDER — ONDANSETRON 2 MG/ML
4 INJECTION INTRAMUSCULAR; INTRAVENOUS EVERY 6 HOURS PRN
Status: DISCONTINUED | OUTPATIENT
Start: 2025-06-21 | End: 2025-06-28

## 2025-06-21 RX ORDER — ONDANSETRON 2 MG/ML
4 INJECTION INTRAMUSCULAR; INTRAVENOUS ONCE
Status: COMPLETED | OUTPATIENT
Start: 2025-06-21 | End: 2025-06-21

## 2025-06-21 NOTE — PLAN OF CARE
NURSING ADMISSION NOTE      Patient admitted via Cart  Oriented to room.  Safety precautions initiated.  Bed in low position.  Call light in reach.  Problem: HEMATOLOGIC - ADULT  Goal: Maintains hematologic stability  Description: INTERVENTIONS  - Assess for signs and symptoms of bleeding or hemorrhage  - Monitor labs and vital signs for trends  - Administer supportive blood products/factors, fluids and medications as ordered and appropriate  - Administer supportive blood products/factors as ordered and appropriate  Outcome: Progressing   Rec'd pt from ED, lethargic, very pale, Ox4, complains of HA, daughter at bedside.  1st U PRBC started in ED, infusing without S/S txn reaction.  Pt had gelatinous black BM, medium sized x2.  Hgb after 2nd U PRBC up to 8.9.  Serial Hgb due at 2100.  Pt reports HA gone, color is still pale but less, extremities warmer and less lethargic.  Dr. Monroe spoke to daughter who is POA and pt was made DNAR select.  Call light within reach, L arm precautions due to history of breast CA, fall precautions maintained.

## 2025-06-21 NOTE — CONSULTS
[facility]    Shi Dhillon Patient Status:  Inpatient    1935 MRN [id number]   Location [facility] 4NW-A Attending Kwaku Monroe, DO   Hosp Day # 0 PCP Breonna Cabral MD     Shi Dhillon is a 89 year old female for BRBPR consult.  Noted in NH not feeling well had NV diarrhea. had large bloody BMs this AM.  Hx bleed  EGD colon then.  No abd pain. Pt weak and poor historian. Non contrast CT diffuse colitis  Lab Results   Component Value Date    WBC 10.9 2025    HGB 4.8 2025    HCT 14.4 2025    .0 2025    CREATSERUM 2.93 2025    BUN 77 2025     2025    K 4.4 2025    CL 99 2025    CO2 22.0 2025     2025    CA 8.4 2025    ALB 3.4 2025    ALKPHO 53 2025    BILT <0.2 2025    TP 5.2 2025    AST 10 2025    ALT 8 2025    LIP 45 2025       Chief Complaint   Patient presents with    GI Bleeding    Nausea/Vomiting/Diarrhea        Allergies: Allergies[1]   Current Medications[2]   Past Medical History[3]   Past Surgical History[4]   Short Social Hx on File[5]  Occupation:    FAMILY HX: GI HX: None, no hx of colon cancer  Family History[6]     REVIEW OF SYSTEMS:   UTO  EXAM:   /40 (BP Location: Right arm)   Pulse 101   Temp 97.9 °F (36.6 °C) (Temporal)   Resp 22   Ht 5' 1\" (1.549 m)   Wt 117 lb 1 oz (53.1 kg)   SpO2 97%   BMI 22.12 kg/m²  - Body mass index is 22.12 kg/m².  GEN: pale frail   LUNGS: CTA  CARDIO: RRR  GI: good BS's,no masses, HSM or tenderness RECTAL: Exam not done. EXTREM.: no edema NEURO: A + O    ASSESSMENT AND PLAN:   Shi Dhillon is a 89 year old female  with rectal bleeding.  Patient in NH not feeling well had NV diarrhea yesterday with rectal bleeding today.  Hb 4.8.  No hematemesis.  EGD colon .  CT non contrast cw diffuse colitis.  Feels weak benign exam.. needs empiric Abc, check C dif transfuse.      1. Await  code status decision.  2. Empiric Flagyl IV.  3. Transfuse  4.  NPO    Total time spent on patient care:  30 minutes    cc: Dr. Bryant ref. provider found    The patient indicates understanding of these issues and agrees to the plan.    Armando Palomo MD  6/21/2025  9:18 AM         [1]   Allergies  Allergen Reactions    Codeine NAUSEA ONLY     Nausea   [2]   No current outpatient medications on file.   [3]   Past Medical History:   Back pain    Diabetes (HCC)    Essential hypertension    High blood pressure    High cholesterol    Osteoarthritis    Pneumonia due to organism   [4] No past surgical history on file.  [5]   Social History  Socioeconomic History    Marital status:    Tobacco Use    Smoking status: Never    Smokeless tobacco: Never   Vaping Use    Vaping status: Never Used   Substance and Sexual Activity    Alcohol use: Never    Drug use: Never   Other Topics Concern    Caffeine Concern Yes    Exercise No   Social History Narrative    The patient uses the following assistive device(s):  quad cane, rolling walker and wheelchair.      The patient does not live in a home with stairs.     Social Drivers of Health     Food Insecurity: No Food Insecurity (5/1/2025)    NCSS - Food Insecurity     Worried About Running Out of Food in the Last Year: No     Ran Out of Food in the Last Year: No   Transportation Needs: No Transportation Needs (5/1/2025)    NCSS - Transportation     Lack of Transportation: No   Housing Stability: Not At Risk (5/1/2025)    NCSS - Housing/Utilities     Has Housing: Yes     Worried About Losing Housing: No     Unable to Get Utilities: No   [6] No family history on file.

## 2025-06-21 NOTE — ED QUICK NOTES
Spoke with Thania on 4th floor - aware will walk up with patient shortly as still on first unit of blood

## 2025-06-21 NOTE — ED QUICK NOTES
Orders for admission, patient is aware of plan and ready to go upstairs. Any questions, please call ED RN [phone].     Patient Covid vaccination status: Unvaccinated     COVID Test Ordered in ED: None    COVID Suspicion at Admission: N/A    Running Infusions: Medication Infusions[1] Blood transfusion running - first     Mental Status/LOC at time of transport: A&Ox3    Other pertinent information:   CIWA score: N/A   NIH score:  N/A             [1]

## 2025-06-21 NOTE — ED INITIAL ASSESSMENT (HPI)
Pt to ED brought by EMS for c/o fatigue. Pt presents actively vomiting with coffee-ground emesis. Stool incontinence also noted. Pt c/o pain under right breast.

## 2025-06-21 NOTE — ED PROVIDER NOTES
Patient Seen in: [facility] Emergency Department        History  Chief Complaint   Patient presents with    GI Bleeding    Nausea/Vomiting/Diarrhea     Stated Complaint: GI Bleed; N/V/D    Subjective:   HPI            The patient is a 89-year-old female presented to the emergency room with reports bloody stools.  The patient is coming from [place].  She tells me she started feeling unwell yesterday after lunch.  She notes she had a bowel movement yesterday but does not know if it was bloody.  On arrival here patient has clear emesis on her chest.  She complains of pain in the right side of her abdomen underneath her right breast.  Patient has dark red bloody stools in her diaper and on her legs.  Patient is not on blood thinners          Objective:     No pertinent past medical history.            No pertinent past surgical history.              No pertinent social history.                              Physical Exam    ED Triage Vitals [06/21/25 0543]   /76   Pulse 91   Resp 25   Temp 97.9 °F (36.6 °C)   Temp src Oral   SpO2 100 %   O2 Device None (Room air)       Current Vitals:   Vital Signs  BP: 118/47  Pulse: 88  Resp: 16  Temp: 97.6 °F (36.4 °C)  Temp src: Temporal  MAP (mmHg): 69    Oxygen Therapy  SpO2: 96 %  O2 Device: Nasal cannula  O2 Flow Rate (L/min): 3 L/min            Physical Exam  Vitals and nursing note reviewed.   Constitutional:       Appearance: Normal appearance. She is ill-appearing.   HENT:      Nose: Nose normal.      Mouth/Throat:      Mouth: Mucous membranes are moist.   Eyes:      Extraocular Movements: Extraocular movements intact.      Pupils: Pupils are equal, round, and reactive to light.   Cardiovascular:      Rate and Rhythm: Normal rate and regular rhythm.      Pulses: Normal pulses.   Pulmonary:      Effort: Pulmonary effort is normal.   Abdominal:      General: Abdomen is flat. There is no distension.      Palpations: Abdomen is soft.      Tenderness: There is  generalized abdominal tenderness. There is no right CVA tenderness, left CVA tenderness, guarding or rebound.      Hernia: No hernia is present.   Musculoskeletal:         General: No swelling or tenderness. Normal range of motion.      Cervical back: Normal range of motion.   Skin:     General: Skin is warm and dry.      Coloration: Skin is pale.   Neurological:      Mental Status: She is alert and oriented to person, place, and time. Mental status is at baseline.   Psychiatric:         Mood and Affect: Mood normal.                 ED Course  Labs Reviewed   COMP METABOLIC PANEL (14) - Abnormal; Notable for the following components:       Result Value    Glucose 335 (*)     Sodium 135 (*)     BUN 77 (*)     Creatinine 2.93 (*)     Calcium, Total 8.4 (*)     Calculated Osmolality 316 (*)     eGFR-Cr 15 (*)     ALT 8 (*)     Alkaline Phosphatase 53 (*)     Bilirubin, Total <0.2 (*)     Total Protein 5.2 (*)     Globulin  1.8 (*)     All other components within normal limits   CBC WITH DIFFERENTIAL WITH PLATELET - Abnormal; Notable for the following components:    RBC 1.40 (*)     HGB 4.8 (*)     HCT 14.4 (*)     .9 (*)     MCH 34.3 (*)     Neutrophil Absolute Prelim 8.63 (*)     Neutrophil Absolute 8.63 (*)     All other components within normal limits   LIPASE - Normal   TYPE AND SCREEN    Narrative:     The following orders were created for panel order Type and screen.  Procedure                               Abnormality         Status                     ---------                               -----------         ------                     ABORH (Blood Type)[[id number]]                               Final result               Antibody Screen[[id number]]                                  Final result                 Please view results for these tests on the individual orders.   ABORH (BLOOD TYPE)   ANTIBODY SCREEN   PREPARE RBC   ABORH CONFIRMATION   RAINBOW DRAW LAVENDER   RAINBOW DRAW LIGHT GREEN   RAINBOW  DRAW BLUE   GI STOOL PANEL BY PCR   C. DIFFICILE(TOXIGENIC)PCR     EKG    Rate, intervals and axes as noted on EKG Report.  Rate: 92  Rhythm: Sinus Rhythm  Reading: no nicole, dep in lateral leads                MDM         Admission disposition: 6/21/2025  7:11 AM           Medical Decision Making    The differential includes the following  Lower GI bleed secondary to colitis, malignancy, diverticular bleed  Anemia     Pertinent comorbidities include  As detailed above     Pertinent social history includes  As detailed above     Labs  Hb is 4.8         External data reviewed  Prior labs from May 28 showing hemoglobin of 8.4, baseline over last year between 7-8  Baseline creatinine appears to be between 2 and 3 with GFR of 17  Care everywhere [place] 2020 note EGD results friable stomach mucosa, biopsy + h pylori   no cscope      Discussion of management with external providers  Dr. Stacia Mayer GI   Pt's daughter who reports she had similar prior episode in 2020    ER course  On arrival patient normotensive nontachycardic afebrile.  She is ill-appearing and pale but is able to provide history as she is alert and oriented x 4.  She has slight diffuse tenderness to palpation.  Patient covered in dark red bloody stools. Cleaned.      6:22 AM  Pt /76, HR 91. She is alert. Hb is 4.8. Discussed with GI Dr. Palomo. He recommends CT of eval for ischemic colitis. Pt prior cmp from 5/28 reveals  GFR of 17. Will need ct w/o contrast.     6:34 AM  Pt taken to ct, no recurrent episodes as of yet. Hospitalist paged.     6:53 AM  Pt back from CT.  She reports she feels sick. No recurrent episodes. Blood transfusion starting. Endorsed to hospitalist    7:01 AM  Vision radiologist - limited study Cystic structure encircling rectum, Thickening of transverse colon. Daughter states she was told mother had mass/cyst but it did not require intervention.   Formal CT report below   CT abdomen/pelvis      IMPRESSION:    Loculated cystic  attenuation structure in the pelvis encircling the rectum extending from the bladder to the presacral space, roughly measuring 12 x 10 x 12 cm.  Evaluation is somewhat limited without IV contrast and as a result beam-hardening artifact from a total hip arthroplasty.  Patient has reportedly had a hysterectomy.  Unclear if this is cystic neoplasm, inclusion cyst, cystic lymphangioma...etc. Consider GYN consultation.    Thickened ascending, transverse and proximal descending colon.  Perhaps mild colitis.  Correlate with inflammatory markers and lactic acid.    No bowel obstruction or free gas  Diverticulosis but no diverticulitis  No appendicitis    Biliary dilatation status post cholecystectomy.  Likely reservoir effect.  Pancreatic atrophy; no main duct dilatation  Renal atrophy; no hydronephrosis or obstructing urolithiasis.    Generalized severe atherosclerosis, including extensive coronary calcifications.  No AAA.    Areas of clustered micronodularity in the lung bases, could be atypical infectious, granulomatous processes or lymphangitic spread of tumor.  There also appears to be bronchial thickening.  Correlate for respiratory symptoms.    Prior left mastectomy    Advanced severe spine degenerative changes    Discussed with Dr. Martínez at 7:59 AM ET    Daniel Wright M.D.  This report has been electronically signed and verified by the Radiologist whose name is printed above.    A total of 60 minutes of critical care time (exclusive of billable procedures) was administered to manage the patient's critical lab values due to her gi bleed leading to anemia.  This involved direct patient intervention, complex decision making, and/or extensive discussions with the patient, family, and clinical staff.      Disposition and Plan     Clinical Impression:  1. Gastrointestinal hemorrhage, unspecified gastrointestinal hemorrhage type    2. Anemia, unspecified type         Disposition:  Admit  6/21/2025  7:11 am    Follow-up:  No  follow-up provider specified.        Medications Prescribed:  Current Discharge Medication List                Supplementary Documentation:         Hospital Problems       Present on Admission  Date Reviewed: 3/1/2025          ICD-10-CM Noted POA    * (Principal) Gastrointestinal hemorrhage, unspecified gastrointestinal hemorrhage type K92.2 6/21/2025 Unknown

## 2025-06-21 NOTE — H&P
[facility] Hospitalist History and Physical      Chief Complaint   Patient presents with    GI Bleeding    Nausea/Vomiting/Diarrhea        PCP: Breonna Cabral MD    History of Present Illness: Patient is a 89 year old female with with history of HTN, HLD, CAD, type II DM with neuropathy, and depression presented for acute rectal bleeding. History limited due to patient condition. Daughter at bedside providing additional history. States she was noted by AL facility that patient began to have bright red rectal bleeding early this morning. Reportedly, was doing well until this incident. Per baseline, patient requires assistance with ADLs, using wheelchair.     Admission Hgb 4 (previously with baseline ~8 on 5/28/25). CT with possible diffuse colitis. C diff and stool testing pending. Admitted for management.     Medical History:  Past Medical History[1]   Past Surgical History[2]   Social History     Tobacco Use    Smoking status: Never    Smokeless tobacco: Never   Substance Use Topics    Alcohol use: Never      Family History[3]    Allergies[4]     Review of Systems  Comprehensive ROS reviewed and negative except for what is stated in HPI.      OBJECTIVE:  /40 (BP Location: Right arm)   Pulse 101   Temp 97.9 °F (36.6 °C) (Temporal)   Resp 22   Ht 5' 1\" (1.549 m)   Wt 117 lb 1 oz (53.1 kg)   SpO2 97%   BMI 22.12 kg/m²   General: No apparent distress.   HEENT:  EOMI, PERRLA.  Pulm: Normal respiratory effort.  CV: Regular rate and rhythm, no murmur.   Abd: Soft, nontender, nondistended.   MSK: Moves extremities spontaneously .  Skin: No lesions or rashes.  Neuro: Limited exam.     Data Review:    LABS:   Lab Results   Component Value Date    WBC 10.9 06/21/2025    HGB 4.8 06/21/2025    HCT 14.4 06/21/2025    .0 06/21/2025    CREATSERUM 2.93 06/21/2025    BUN 77 06/21/2025     06/21/2025    K 4.4 06/21/2025    CL 99 06/21/2025    CO2 22.0 06/21/2025     06/21/2025    CA  8.4 06/21/2025    ALB 3.4 06/21/2025    ALKPHO 53 06/21/2025    BILT <0.2 06/21/2025    TP 5.2 06/21/2025    AST 10 06/21/2025    ALT 8 06/21/2025    LIP 45 06/21/2025       All lab work and imaging personally reviewed.    Assessment/Plan:     Assessment/ Plan:     Patient is a 89 year old female with with history of HTN, HLD, CAD, type II DM with neuropathy, and depression presented for acute rectal bleeding.     #Acute on chronic anemia 2/2 rectal bleeding  -CT with diffuse colitis, C-diff and stool testing pending  -STAT prbc transfusion  -trend Hgb accordingly   -empiric IV flagyl per GI  -maintain NPO  -monitor hemodynamics- labile BP when seen will reevaluate with transfusion. Daughter POA at bedside agreeable to transfer to ICU if it is required.     #CKD  -monitor     #HTN  #HLD  #CAD  #Type II DM  -accuchecks, holding home meds for NPO, verify med rec     Advanced Care Planning    While discussing goals of care with the patient and their family, Patients POA/ daughter voluntarily participated in an advanced care planning discussion. The following was discussed: DNR.    16 minutes were spent in discussing advanced care planning. This time was exclusive of the documented time for this visit.    DVT ppx: SCDs  Diet: NPO  Disposition: depending clinical course  Code status: DNR     Outpatient records or previous hospital records reviewed.   [facility] hospitalist to continue to follow patient while in house.    KwakuDO Leyla Winter [facility] Hospitalist       [1]   Past Medical History:   Back pain    Diabetes (HCC)    Essential hypertension    High blood pressure    High cholesterol    Osteoarthritis    Pneumonia due to organism   [2] No past surgical history on file.  [3] No family history on file.  [4]   Allergies  Allergen Reactions    Codeine NAUSEA ONLY     Nausea

## 2025-06-21 NOTE — ED QUICK NOTES
Patient received from [facility]. Patient with nausea, no vomiting. Verbal orders given and carried out. First blood transfusion running.

## 2025-06-22 LAB
ANION GAP SERPL CALC-SCNC: 11 MMOL/L (ref 0–18)
BASOPHILS # BLD: 0 X10(3) UL (ref 0–0.2)
BASOPHILS NFR BLD: 0 %
BLOOD TYPE BARCODE: 6200
BLOOD TYPE BARCODE: 6200
BUN BLD-MCNC: 72 MG/DL (ref 9–23)
C DIFF TOX B STL QL: NEGATIVE
CALCIUM BLD-MCNC: 8.6 MG/DL (ref 8.7–10.6)
CHLORIDE SERPL-SCNC: 108 MMOL/L (ref 98–112)
CO2 SERPL-SCNC: 21 MMOL/L (ref 21–32)
CREAT BLD-MCNC: 3.26 MG/DL (ref 0.55–1.02)
EGFRCR SERPLBLD CKD-EPI 2021: 13 ML/MIN/1.73M2 (ref 60–?)
EOSINOPHIL # BLD: 0 X10(3) UL (ref 0–0.7)
EOSINOPHIL NFR BLD: 0 %
ERYTHROCYTE [DISTWIDTH] IN BLOOD BY AUTOMATED COUNT: 18.2 %
GLUCOSE BLD-MCNC: 119 MG/DL (ref 70–99)
GLUCOSE BLD-MCNC: 136 MG/DL (ref 70–99)
GLUCOSE BLD-MCNC: 148 MG/DL (ref 70–99)
GLUCOSE BLD-MCNC: 152 MG/DL (ref 70–99)
GLUCOSE BLD-MCNC: 155 MG/DL (ref 70–99)
HCT VFR BLD AUTO: 20.7 % (ref 35–48)
HGB BLD-MCNC: 6.8 G/DL (ref 12–16)
HGB BLD-MCNC: 7.1 G/DL (ref 12–16)
LYMPHOCYTES NFR BLD: 11 %
LYMPHOCYTES NFR BLD: 2.42 X10(3) UL (ref 1–4)
MCH RBC QN AUTO: 31.8 PG (ref 26–34)
MCHC RBC AUTO-ENTMCNC: 34.3 G/DL (ref 31–37)
MCV RBC AUTO: 92.8 FL (ref 80–100)
MONOCYTES # BLD: 0.88 X10(3) UL (ref 0.1–1)
MONOCYTES NFR BLD: 4 %
MORPHOLOGY: NORMAL
NEUTROPHILS # BLD AUTO: 16.54 X10 (3) UL (ref 1.5–7.7)
NEUTROPHILS NFR BLD: 84 %
NEUTS BAND NFR BLD: 1 %
NEUTS HYPERSEG # BLD: 18.7 X10(3) UL (ref 1.5–7.7)
OSMOLALITY SERPL CALC.SUM OF ELEC: 312 MOSM/KG (ref 275–295)
PLATELET # BLD AUTO: 141 10(3)UL (ref 150–450)
PLATELET MORPHOLOGY: NORMAL
POTASSIUM SERPL-SCNC: 4.2 MMOL/L (ref 3.5–5.1)
RBC # BLD AUTO: 2.23 X10(6)UL (ref 3.8–5.3)
SODIUM SERPL-SCNC: 140 MMOL/L (ref 136–145)
TOTAL CELLS COUNTED BLD: 100
UNIT VOLUME: 350 ML
UNIT VOLUME: 350 ML
WBC # BLD AUTO: 22 X10(3) UL (ref 4–11)

## 2025-06-22 RX ORDER — PANTOPRAZOLE SODIUM 40 MG/1
40 TABLET, DELAYED RELEASE ORAL
Status: DISCONTINUED | OUTPATIENT
Start: 2025-06-22 | End: 2025-06-22

## 2025-06-22 RX ORDER — SODIUM CHLORIDE 9 MG/ML
INJECTION, SOLUTION INTRAVENOUS ONCE
Status: COMPLETED | OUTPATIENT
Start: 2025-06-22 | End: 2025-06-22

## 2025-06-22 RX ORDER — METRONIDAZOLE 500 MG/1
500 TABLET ORAL 2 TIMES DAILY
Status: DISCONTINUED | OUTPATIENT
Start: 2025-06-22 | End: 2025-06-27

## 2025-06-22 RX ORDER — ACETAMINOPHEN 325 MG/1
650 TABLET ORAL EVERY 4 HOURS PRN
Status: DISCONTINUED | OUTPATIENT
Start: 2025-06-22 | End: 2025-06-28

## 2025-06-22 NOTE — PHYSICAL THERAPY NOTE
PHYSICAL THERAPY EVALUATION - INPATIENT     Room Number: 405/405-A  Evaluation Date: 6/22/2025  Type of Evaluation: Initial  Physician Order: PT Eval and Treat    Presenting Problem: GI hemorrhage and anemia  Co-Morbidities : HLD, CAD, CHF, chronic pn c polyneuropathy, spondylosis, DM2, R ROSALIA, breast CA  Reason for Therapy: Mobility Dysfunction and Discharge Planning    CT of abdomen/pelvis 6/21/25-CONCLUSION:    1. Sensitivity decreased without IV or oral contrast.  The study is further compromised as the patient could not raise her arms.   2. There is a fluid collection in pelvis.  It is nonspecific and may be loculated ascites.  The uterus is absent and this may be a chronic postoperative fluid collection.  No additional imaging findings suggesting infection.  Clinical correlation in this    regard recommended.   3. Diffusely prominent colon.  This may be due to under distension as there is no pericolonic stranding.  Low-grade or chronic colitis is possible.  Please correlate with clinical suspicion.   4. Nonvisualization of the gallbladder, presumably related to prior cholecystectomy.  Dilated common bile duct.  Correlation with LFTs recommended.   5. Reticular nodular opacities at the lung base.  These may be chronic postinflammatory/infectious in nature as there is evidence of prior granulomatous disease elsewhere.  Please correlate with any acute pulmonary symptoms.   6. Details as above.  Continued clinical correlation and follow-up recommended.    PHYSICAL THERAPY ASSESSMENT   Patient is a 89 year old female admitted 6/21/2025 for GI bleeding, N/V/D.  Prior to admission, patient's baseline is up until most recent admission- pivot t/f to/from her bed<>WC primarily herself, however has recently been requiring 1 person assist to complete. She receives assist from staff c all ADLs, however pt and dtr noting incr in tremors which is making daily tasks challenging.  Patient is currently functioning below baseline  with bed mobility, transfers, maintaining seated position, and standing prolonged periods.  Patient is requiring moderate assist as a result of the following impairments: decreased functional strength, decreased endurance/aerobic capacity, pain, impaired static/dynamic sitting balance, decreased muscular endurance, and increased O2 needs from baseline.  Physical Therapy will continue to follow for duration of hospitalization.    Patient will benefit from continued skilled PT Services to promote return to prior level of function and safety with continuous assistance and gradual rehabilitative therapy .    PLAN DURING HOSPITALIZATION  Nursing Mobility Recommendation : Lift Equipment  PT Device Recommendation: Mechanical lift  PT Treatment Plan: Bed mobility, Body mechanics, Endurance, Energy conservation, Patient education, Family education, Strengthening, Transfer training, Balance training  Rehab Potential : Fair  Frequency (Obs): 3-5x/week     CURRENT GOALS    Goal #1 Patient is able to demonstrate supine - sit EOB @ level: minimum assistance     Goal #2 Patient is able to demonstrate transfers EOB to/from Chair/Wheelchair at assistance level: moderate assistance     Goal #3 Patient will demo ability to tolerate prolonged sitting unsupported x10 min to improve ability to tolerate completion of ADLs     Goal #4    Goal #5    Goal #6    Goal Comments: Goals established on 6/22/2025      PHYSICAL THERAPY MEDICAL/SOCIAL HISTORY  History related to current admission: Patient is a 89 year old female admitted on 6/21/2025 from [facility] for GI bleed, N/V/D.  Pt diagnosed with GI hemorrhage and anemia.    HOME SITUATION  Type of Home: Assisted living facility ([facility])  Home Layout: One level, Ramped entrance                     Lives With: Staff 24 hours    Drives: No   Patient Regularly Uses: Wheelchair, Other (Comment) (grab bars, shower chair)      Prior Level of [place]: patient's baseline is up until most  recent admission- pivot t/f to/from her bed<>WC primarily herself, however has recently been requiring 1 person assist to complete. She receives assist from staff c all ADLs, however pt and dtr noting incr in tremors which is making daily tasks challenging. Pt has been needing assist c food set up, cutting/preparing her food and intermittently feeding her. Constant movement of head/upper trunk and BUE.     SUBJECTIVE  \"I just feel so much weaker\"    OBJECTIVE  Precautions: Limb alert - left, Bed/chair alarm, Other (Comment) (mod tremors- head/trunk and BUE)  Fall Risk: High fall risk    WEIGHT BEARING RESTRICTION     PAIN ASSESSMENT  Rating: Unable to rate  Location: B heel  Management Techniques: Activity promotion, Body mechanics, Repositioning, Other (Comment) (maintain elevation c bunny boots)    COGNITION  Overall Cognitive Status:  WFL - within functional limits  Arousal/Alertness:  appropriate responses to stimuli  Orientation Level:  oriented x4  Following Commands:  follows all commands and directions without difficulty  Safety Judgement:  good awareness of safety precautions    RANGE OF MOTION AND STRENGTH ASSESSMENT  Upper extremity ROM and strength are -limited d/t arthritis and pn- able to reach to chest level; full ROM of B elbow and minimally functional grasp B hands; unable to complete thumb opposition B    Lower extremity ROM is within functional limits     Lower extremity strength is- RLE hip flex 3-/5, quad 4-/5, ankle 4/5; LLE hip flex 3+/5, quad 4/5, ankle 4/5    BALANCE  Static Sitting: Fair -  Dynamic Sitting: Poor +  Static Standing: Not tested  Dynamic Standing: Not tested    ADDITIONAL TESTS                                    ACTIVITY TOLERANCE                         O2 WALK       NEUROLOGICAL FINDINGS  Neurological Findings: Sensation           Sensation: BLE symmetrical/intact to light touch, however diminished grossly along B heels d/t neuropathy         AM-PAC '6-Clicks' INPATIENT  SHORT FORM - BASIC MOBILITY  How much difficulty does the patient currently have...  Patient Difficulty: Turning over in bed (including adjusting bedclothes, sheets and blankets)?: A Lot   Patient Difficulty: Sitting down on and standing up from a chair with arms (e.g., wheelchair, bedside commode, etc.): A Lot   Patient Difficulty: Moving from lying on back to sitting on the side of the bed?: A Lot   How much help from another person does the patient currently need...   Help from Another: Moving to and from a bed to a chair (including a wheelchair)?: A Lot   Help from Another: Need to walk in hospital room?: Total   Help from Another: Climbing 3-5 steps with a railing?: Total     AM-PAC Score:  Raw Score: 10   Approx Degree of Impairment: 76.75%   Standardized Score (AM-PAC Scale): 32.29   CMS Modifier (G-Code): CL    FUNCTIONAL ABILITY STATUS  Gait Assessment   Functional Mobility/Gait Assessment  Gait Assistance: Not tested  Distance (ft): n/a    Skilled Therapy Provided   Pt presents to PT lying in semi supine position in bed c dtr present. Pt is agreeable to participate, however reporting that her R hip has been bothering her and she has been very fatigued. Pt educated on frequent repositioning, elevation of B feet and benefits of being upright. Pt has a dressing on her L heel, however reporting mild pn in R heel (bunny boot on L only-d/w RN to obtain for B feet). Pt educated on log rolling and use of proper body mechanics to complete t/f OOB. Pt able to roll to her side c min A and then t/f to sitting c mod A. Requires total assist to scoot and reposition at the EOB, however was continuously raising R>L off the ground and hovering above the surface-cues to remain down. No change in constant head/upper neck and BUE tremors when in sitting. She is unable to complete B thumb opposition on all fingers d/t coordination deficits and tremors as well. Pt tolerated sitting for several min, however then fatigued and  requested to return to laying down. T/f back to supine c mod Ax1. Additional person requested to assist in boosting and HOB elev for proper positioning to get ready to eat. Pt's dtr placed tray over her lap and donned towel onto her chest. Pt severely challenged c grasping utensil and moving obj's around her tray despite close proximity. She required assist from dtr to deter spilling soup from spoon from tray to her mouth and then finally gave in and requested the lemon ice instead. Dtr reporting that this has gotten significantly worse- RN aware.       Bed Mobility:  Rolling: min A  Supine to sit: mod A   Sit to supine: mod A     Transfer Mobility:  Sit to stand: NT   Stand to sit: NT  Gait = NT    Therapist's Comments: monitor tremors- work on pivot t/f    Exercise/Education Provided:  Bed mobility  Body mechanics  Energy conservation  Functional activity tolerated  Posture  Transfer training    Patient End of Session: In bed, Needs met, Call light within reach, RN aware of session/findings, All patient questions and concerns addressed, Hospital anti-slip socks, Family present      Patient Evaluation Complexity Level:  History Moderate - 1 or 2 personal factors and/or co-morbidities   Examination of body systems Moderate - addressing a total of 3 or more elements   Clinical Presentation  Moderate - Evolving   Clinical Decision Making Moderate Complexity       PT Session Time: 30 minutes    Therapeutic Activity: 12 minutes

## 2025-06-22 NOTE — PROGRESS NOTES
[facility]  Progress Note    Shi Dhillon Patient Status:  Inpatient    1935 MRN [id number]   [facility] 4NW-A Attending Kwaku Monroe, DO   Hosp Day # 1 PCP Breonna Cabral MD     Subjective:  Shi Dhillon is a(n) 89 year old female.Pt with no further BMs per RN.  Pt states feels better no abd pain  ROS: No NV.    Objective:  Blood pressure 101/30, pulse 77, temperature 98 °F (36.7 °C), temperature source Axillary, resp. rate 20, height 5' 1\" (1.549 m), weight 117 lb (53.1 kg), SpO2 98%.  Physical Exam:  GEN: well developed, well nourished, NAD  HEENT: non-icteric   LUNGS: CTA  CARDIO: RRR  GI: good BS's,no masses, HSM or tenderness RECTAL: Exam not done. EXTREM.: no edema NEURO: A + O      Labs:   Lab Results   Component Value Date    WBC 22.0 2025    HGB 7.1 2025    HCT 20.7 2025    .0 2025    CREATSERUM 3.26 2025    BUN 72 2025     2025    K 4.2 2025     2025    CO2 21.0 2025     2025    CA 8.6 2025    PGLU 152 2025       Hb= 7.1 <= 7.9 <= 8.9 < Tx 2 U PRBC <= 4.9    Assessment:  Problem List[1]     Shi Dhillon is a(n) 89 year old female. Pt with recent rectal bleeding.  No further bleeding, Hb decrease but stable. 2 units given so far Hb trend stable.  Non contrast CT likely colitis.  No BMs for Cx C dif.  DNR conservative management. No role for endoscopy      Plan:    1.  Clears today advance as tolerated  2.  Cont Abx 5 more days.  3.  Cont PO daily PPI  4.  Cancel C dif.  5.  No further eval planned  6.  DC when stable, FU as needed    Discussed with daughter    Total time spent on patient care:  12 minutes    Armando Palomo MD  2025  9:11 AM         [1]   Patient Active Problem List  Diagnosis    Fall    Gastrointestinal hemorrhage with hematemesis    History of breast cancer    Low back pain    NSAID induced gastritis    Type 2 diabetes mellitus  without complication, with long-term current use of insulin (HCC)    Lumbar spondylosis    Spinal stenosis of lumbar region with neurogenic claudication    Diabetic polyneuropathy associated with type 2 diabetes mellitus ([facility])    Gait disturbance    Chronic pain of both shoulders    Effusion of joint of right shoulder    Primary osteoarthritis of right shoulder    Chronic pain syndrome    Hyponatremia    Anemia    Hyperglycemia    Acute on chronic congestive heart failure, unspecified heart failure type ([facility])    Hypoxia    Left foot pain    Nausea and vomiting, unspecified vomiting type    Gastrointestinal hemorrhage, unspecified gastrointestinal hemorrhage type    Anemia, unspecified type

## 2025-06-22 NOTE — PLAN OF CARE
Patient is alert and oriented x 4, room air, abdomen soft, bowel sound present, incontinent of bowel and bladder, 1 dark stool overnight, IVF infusing, medications per MAR, VSS, family at bedside. Recheck HgB at 2100 was 7.8.

## 2025-06-22 NOTE — SPIRITUAL CARE NOTE
Spiritual Care Visit Note    Patient Name: Shi Dhillon Date of Spiritual Care Visit: 25   : 1935 Primary Dx: Gastrointestinal hemorrhage, unspecified gastrointestinal hemorrhage type       Referred By: Referral From: Nurse    Spiritual Care Taxonomy:    Intended Effects: Aligning care plan with patient's values    Methods: Collaborate with care team member, Offer emotional support, Offer support    Interventions: Active listening, Acknowledge current situation, Identify supportive relationship(s), Assist someone with Advance Directives    Visit Type/Summary:     - PoA: Identified Existing PoAH - Visited patient in response to POA for Healthcare consult/request. Prior to visit, reviewed the patient's EHR and paper chart to identify any existing PoAH documentation. A previously completed PoAH document was not located. At time of visit patient was receiving care.  visited with patient's daughters in the hallway. In conversation with patient's daughter, Jaymie, she indicated her mom has an existing POAH that is accurate and appoints her POAH.  requested she provide a copy of the existing document to be placed on the patient's chart.   remains available for follow up.    Spiritual Care support can be requested via an Epic consult. For urgent/immediate needs, please contact the On Call  at: Edward: ext [phone]    Rev Marilia Bess MA

## 2025-06-22 NOTE — PROGRESS NOTES
[facility] Hospitalist Progress Note     Subjective:  One dark stool reported overnight.     Review of Systems  Comprehensive ROS reviewed and negative except for what is stated in HPI.      OBJECTIVE:  /30 (BP Location: Left arm)   Pulse 77   Temp 98 °F (36.7 °C) (Axillary)   Resp 20   Ht 5' 1\" (1.549 m)   Wt 117 lb (53.1 kg)   SpO2 98%   BMI 22.11 kg/m²   General: No apparent distress.   HEENT:  EOMI, PERRLA.  Pulm: Normal respiratory effort.  CV: Regular rate and rhythm, no murmur.   Abd: Soft, nontender, nondistended.   MSK: Moves extremities spontaneously .  Skin: No lesions or rashes.  Neuro: Limited exam.       Data Review:    LABS:   Lab Results   Component Value Date    WBC 22.0 06/22/2025    HGB 7.1 06/22/2025    HCT 20.7 06/22/2025    .0 06/22/2025    CREATSERUM 3.26 06/22/2025    BUN 72 06/22/2025     06/22/2025    K 4.2 06/22/2025     06/22/2025    CO2 21.0 06/22/2025     06/22/2025    CA 8.6 06/22/2025    PGLU 152 06/22/2025       All lab work and imaging personally reviewed.    Assessment/Plan:     Assessment/ Plan:     Patient is a 89 year old female with with history of HTN, HLD, CAD, type II DM with neuropathy, and depression presented for acute rectal bleeding.      #Acute on chronic anemia 2/2 rectal bleeding  -CT with diffuse colitis, C-diff and stool testing pending  -s/p 2 units prbc, trend Hgb   -empiric IV flagyl per GI  -maintain NPO, continue PPI, supportive care     #CKD  -monitor      #HTN  #HLD  #CAD  #Type II DM  -accuchecks, holding home antihypertensives     DVT ppx: SCDs  Diet: NPO  Disposition: depending clinical course  Code status: DNR      Outpatient records or previous hospital records reviewed.   [facility] hospitalist to continue to follow patient while in house.     DO Leyla [facility] Hospitalist

## 2025-06-22 NOTE — SPIRITUAL CARE NOTE
Spiritual Care Visit Note    Patient Name: Shi Dhillon Date of Spiritual Care Visit: 25   : 1935 Primary Dx: Gastrointestinal hemorrhage, unspecified gastrointestinal hemorrhage type       Referred By: Referral From: Nurse    Spiritual Care Taxonomy:    Intended Effects: Aligning care plan with patient's values    Methods: Collaborate with care team member    Interventions: Acknowledge current situation    Visit Type/Summary:     - PoA: Other:  attempted visit. At time of visit, patient receiving medical care.  will return later.    Spiritual Care support can be requested via an [employer] consult. For urgent/immediate needs, please contact the On Call  at: Edward: ext [phone]    Rev Marilia Bess MA

## 2025-06-22 NOTE — PROGRESS NOTES
Pt with black stool Hb 6.8  Transfuse.    Still; will hold on further evaluation unless signs of ongoing bleeding needing intervention.    Clear liquid  Follow labs  Cont PPI with re assessment in AM    DW POA daughter Still opting not be very aggressive unless needed

## 2025-06-23 LAB
ADENOVIRUS F 40/41 PCR: NEGATIVE
ANION GAP SERPL CALC-SCNC: 12 MMOL/L (ref 0–18)
ASTROVIRUS PCR: NEGATIVE
ATRIAL RATE: 92 BPM
BLOOD TYPE BARCODE: 6200
BUN BLD-MCNC: 72 MG/DL (ref 9–23)
C CAYETANENSIS DNA SPEC QL NAA+PROBE: NEGATIVE
CALCIUM BLD-MCNC: 8.7 MG/DL (ref 8.7–10.6)
CAMPY SP DNA.DIARRHEA STL QL NAA+PROBE: NEGATIVE
CHLORIDE SERPL-SCNC: 106 MMOL/L (ref 98–112)
CO2 SERPL-SCNC: 21 MMOL/L (ref 21–32)
CREAT BLD-MCNC: 2.86 MG/DL (ref 0.55–1.02)
CRYPTOSP DNA SPEC QL NAA+PROBE: NEGATIVE
EAEC PAA PLAS AGGR+AATA ST NAA+NON-PRB: NEGATIVE
EC STX1+STX2 + H7 FLIC SPEC NAA+PROBE: NEGATIVE
EGFRCR SERPLBLD CKD-EPI 2021: 15 ML/MIN/1.73M2 (ref 60–?)
ENTAMOEBA HISTOLYTICA PCR: NEGATIVE
EPEC EAE GENE STL QL NAA+NON-PROBE: NEGATIVE
ERYTHROCYTE [DISTWIDTH] IN BLOOD BY AUTOMATED COUNT: 17 %
ETEC LTA+ST1A+ST1B TOX ST NAA+NON-PROBE: NEGATIVE
GIARDIA LAMBLIA PCR: NEGATIVE
GLUCOSE BLD-MCNC: 111 MG/DL (ref 70–99)
GLUCOSE BLD-MCNC: 128 MG/DL (ref 70–99)
GLUCOSE BLD-MCNC: 132 MG/DL (ref 70–99)
GLUCOSE BLD-MCNC: 189 MG/DL (ref 70–99)
GLUCOSE BLD-MCNC: 207 MG/DL (ref 70–99)
HCT VFR BLD AUTO: 29.5 % (ref 35–48)
HGB BLD-MCNC: 10.2 G/DL (ref 12–16)
HGB BLD-MCNC: 8.3 G/DL (ref 12–16)
HGB BLD-MCNC: 8.6 G/DL (ref 12–16)
MAGNESIUM SERPL-MCNC: 2 MG/DL (ref 1.6–2.6)
MCH RBC QN AUTO: 31 PG (ref 26–34)
MCHC RBC AUTO-ENTMCNC: 34.6 G/DL (ref 31–37)
MCV RBC AUTO: 89.7 FL (ref 80–100)
NOROVIRUS GI/GII PCR: NEGATIVE
OSMOLALITY SERPL CALC.SUM OF ELEC: 310 MOSM/KG (ref 275–295)
P AXIS: 50 DEGREES
P SHIGELLOIDES DNA STL QL NAA+PROBE: NEGATIVE
P-R INTERVAL: 142 MS
PLATELET # BLD AUTO: 132 10(3)UL (ref 150–450)
POTASSIUM SERPL-SCNC: 3.9 MMOL/L (ref 3.5–5.1)
Q-T INTERVAL: 384 MS
QRS DURATION: 80 MS
QTC CALCULATION (BEZET): 474 MS
R AXIS: -4 DEGREES
RBC # BLD AUTO: 3.29 X10(6)UL (ref 3.8–5.3)
ROTAVIRUS A PCR: NEGATIVE
SALMONELLA DNA SPEC QL NAA+PROBE: NEGATIVE
SAPOVIRUS PCR: NEGATIVE
SHIGELLA SP+EIEC IPAH ST NAA+NON-PROBE: NEGATIVE
SODIUM SERPL-SCNC: 139 MMOL/L (ref 136–145)
T AXIS: 100 DEGREES
UNIT VOLUME: 350 ML
V CHOLERAE DNA SPEC QL NAA+PROBE: NEGATIVE
VENTRICULAR RATE: 92 BPM
VIBRIO DNA SPEC NAA+PROBE: NEGATIVE
WBC # BLD AUTO: 20 X10(3) UL (ref 4–11)
YERSINIA DNA SPEC NAA+PROBE: NEGATIVE

## 2025-06-23 RX ORDER — SODIUM CHLORIDE, SODIUM LACTATE, POTASSIUM CHLORIDE, CALCIUM CHLORIDE 600; 310; 30; 20 MG/100ML; MG/100ML; MG/100ML; MG/100ML
INJECTION, SOLUTION INTRAVENOUS CONTINUOUS
Status: DISCONTINUED | OUTPATIENT
Start: 2025-06-23 | End: 2025-06-28

## 2025-06-23 NOTE — PLAN OF CARE
Latest HGB were 10.2 from this morning , had a large amount of black to reddish loose stools and small amount of coffee ground emesis.  Was seen by GI earlier, he spoke to patient's POA which opted for  conservative treatment for now, For serial hemogram Q8 hours.  Pt is alert and oriented, color is pale, has back pains, repositioned for comfort.  Has frequent hand and head tremors, worst with activity, per patient, side effect of one of her medications for diabetes. Neurology was consulted.  1200 Repeat hemogram were 8.6, MD is aware, changed hemogram to Q6, stools sent for GI panel.  Pt is thirsty, ice chips given. Family at bedside.   1800 Repeat HGB 8.3, no further stools noted. Started on IV fluids.   Problem: HEMATOLOGIC - ADULT  Goal: Free from bleeding injury  Description: (Example usage: patient with low platelets)  INTERVENTIONS:  - Avoid intramuscular injections, enemas and rectal medication administration  - Ensure safe mobilization of patient  - Hold pressure on venipuncture sites to achieve adequate hemostasis  - Assess for signs and symptoms of internal bleeding  - Monitor lab trends  - Patient is to report abnormal signs of bleeding to staff  - Avoid use of toothpicks and dental floss  - Use electric shaver for shaving  - Use soft bristle tooth brush  - Limit straining and forceful nose blowing  Outcome: Not Progressing     Problem: HEMATOLOGIC - ADULT  Goal: Maintains hematologic stability  Description: INTERVENTIONS  - Assess for signs and symptoms of bleeding or hemorrhage  - Monitor labs and vital signs for trends  - Administer supportive blood products/factors, fluids and medications as ordered and appropriate  - Administer supportive blood products/factors as ordered and appropriate  Outcome: Progressing     Problem: METABOLIC/FLUID AND ELECTROLYTES - ADULT  Goal: Glucose maintained within prescribed range  Description: INTERVENTIONS:  - Monitor Blood Glucose as ordered  - Assess for signs and  symptoms of hyperglycemia and hypoglycemia  - Administer ordered medications to maintain glucose within target range  - Assess barriers to adequate nutritional intake and initiate nutrition consult as needed  - Instruct patient on self management of diabetes  Outcome: Progressing

## 2025-06-23 NOTE — CM/SW NOTE
PASRR screen completed for potential SNF admission at DC. Outcome letter attached to AMIRAH referral in Aidin.    CM/SW will remain available for DC planning and/or support.     GEOVANI GloriaN, CMSRN    [id number]

## 2025-06-23 NOTE — CM/SW NOTE
SAMEER called and spoke with patient's POA jagruti regarding anticipated therapy need for rehab placement for patient. She is agreeable and requested that referral be sent to facilities near the [address] [place] [address]. Preferred facility is [facility] as patient has been there twice before.     SAMEER will email AMIRAH choice list to Jagruti at [email or web address] once list is available.     SAMEER will continue to follow for plan of care changes and remain available for any additional DC needs or concerns.     Neelam Mcmillan MSW, LSW  Discharge Planner   [phone]

## 2025-06-23 NOTE — PLAN OF CARE
Pt alert/oriented x 4.  Generalized weakness.  Tremors noted to head and bilateral upper extremities.  Hgb 7.1 this morning, 6.8 this afternoon.  Dr. Monroe and Dr. Palomo notified.  Blood transfusion ordered, 2 units total to be administered. Pt had large black bowel movement this afternoon, stool sent. Dr. Palomo notified of above. Abdomen soft with active bowel sounds.  Appetite poor, denies nausea/vomiting. Incontinent of bowel and bladder. Complaints of right hip pain, pt states h/o broken hip years ago and \"has been hurting.\" Tylenol given per MAR. Clear liquid diet. Monitor BMs. Family visiting on day shift. Updated pt and family re: plan of care. Needs addressed, repositioned for comfort, bunny boots in place. Meds per MAR. Safety measures in place.     Problem: SKIN/TISSUE INTEGRITY - ADULT  Goal: Skin integrity remains intact  Description: INTERVENTIONS  - Assess and document risk factors for pressure ulcer development  - Assess and document skin integrity  - Monitor for areas of redness and/or skin breakdown  - Initiate interventions, skin care algorithm/standards of care as needed  Outcome: Progressing     Problem: HEMATOLOGIC - ADULT  Goal: Maintains hematologic stability  Description: INTERVENTIONS  - Assess for signs and symptoms of bleeding or hemorrhage  - Monitor labs and vital signs for trends  - Administer supportive blood products/factors, fluids and medications as ordered and appropriate  - Administer supportive blood products/factors as ordered and appropriate  Outcome: Not Progressing

## 2025-06-23 NOTE — DIETARY NOTE
[facility]   part of [facility]   CLINICAL NUTRITION    Shi Dhillon     Admitting diagnosis:  Gastrointestinal hemorrhage, unspecified gastrointestinal hemorrhage type [K92.2]  Anemia, unspecified type [D64.9]    Ht: 154.9 cm (5' 1\")  Wt: 53.1 kg (117 lb).   Body mass index is 22.11 kg/m².  IBW: 47.7kg    Wt Readings from Last 6 Encounters:   06/21/25 53.1 kg (117 lb)   05/28/25 53.1 kg (117 lb)   04/30/25 53.1 kg (117 lb)   12/11/24 54.4 kg (120 lb)   09/17/24 54.4 kg (120 lb)   06/27/24 54.4 kg (120 lb)        Labs/Meds reviewed    Diet:       Procedures    NPO     Percent Meals Eaten (last 3 days)       Date/Time Percent Meals Eaten (%)    06/22/25 1300 120 %    06/22/25 1700 100 %          89 year old female admitted with acute rectal bleeding, low hemoglobin s/p 2 units prbc. Ct with diffuse colitis, cdiff neg    PMH:  HTN, CAD, DM with neuropathy    Pt chart reviewed d/t MST+. Pt remains NPO  Met with pt at bedside and she denies any wt loss and states eating well prior admit.  She is asking for ice chips deferred to RN   Patient reports good appetite at this time.  Nursing notes reports Percent Meals Eaten (%): 100 % intake for last meal.  Tolerating po diet without diarrhea, emesis, or constipation.   No significant weight changes noted.     Patient is at low nutrition risk at this time.    Please consult if patient status changes or nutrition issues arise.    Lisa Cummings, RD,LDN  Clinical Dietitian   [phone]

## 2025-06-23 NOTE — CM/SW NOTE
SW sent referral for rehab placement via Aidin in the event that patient requires additional therapy post DC. SW will discuss DC plan and choice list if appropriate with family once it becomes available.     [facility] submitted, PASRR needed. Insurance authorization will also be needed due to medicare advantage plan.     SW will continue to follow for plan of care changes and remain available for any additional DC needs or concerns.     Neelam Mcmillan MSW, LSW  Discharge Planner   [phone]

## 2025-06-23 NOTE — PROGRESS NOTES
[facility] Hospitalist Progress Note     Subjective:  One dark stool reported overnight.     Review of Systems  Comprehensive ROS reviewed and negative except for what is stated in HPI.      OBJECTIVE:  BP (!) 161/58 (BP Location: Right arm)   Pulse 89   Temp 98.1 °F (36.7 °C) (Oral)   Resp 16   Ht 5' 1\" (1.549 m)   Wt 117 lb (53.1 kg)   SpO2 98%   BMI 22.11 kg/m²   General: Alert, cooperative.   HEENT:  EOMI, PERRLA.  Pulm: Normal respiratory effort.  CV: Regular rate and rhythm, no murmur.   Abd: Soft, nontender, nondistended.   MSK: Moves extremities spontaneously .  Skin: No lesions or rashes.  Neuro: Aox4. Moving extremities. + tremors in BUE and head.       Data Review:    LABS:   Lab Results   Component Value Date    WBC 20.0 06/23/2025    HGB 10.2 06/23/2025    HCT 29.5 06/23/2025    .0 06/23/2025    CREATSERUM 2.86 06/23/2025    BUN 72 06/23/2025     06/23/2025    K 3.9 06/23/2025     06/23/2025    CO2 21.0 06/23/2025     06/23/2025    CA 8.7 06/23/2025    MG 2.0 06/23/2025    PGLU 128 06/23/2025       All lab work and imaging personally reviewed.    Assessment/Plan:     Assessment/ Plan:     Patient is a 89 year old female with with history of HTN, HLD, CAD, type II DM with neuropathy, and depression presented for acute rectal bleeding.      #Acute on chronic anemia 2/2 rectal bleeding  -CT with diffuse colitis, C-diff neg, stool path pcr pending  -s/p 2 units prbc, trend Hgb q6  -empiric abx per GI  -maintain NPO, continue PPI, supportive care   -D/W GI Dr. Gibbons and patient's daughter.  She has a high likelihood of clinical deterioration, will continue conservative management for now.  Discussed option of hospice and patient and daughter are agreeable if clinical status worsens.    #Chronic tremors  -Appears to be worsening. Will consult neuro to see if there is any additional input.     #CKD  -monitor      #HTN  #HLD  #CAD  #Type II DM  -accludin, holding  home antihypertensives     DVT ppx: SCDs  Diet: NPO  Disposition: depending clinical course  Code status: DNR/ DNI    D/W RN     Outpatient records or previous hospital records reviewed.   DMDANIELLE hospitalist to continue to follow patient while in house.     Dheeraj Gregory DO  [facility] Hospitalist

## 2025-06-23 NOTE — PLAN OF CARE
Problem: METABOLIC/FLUID AND ELECTROLYTES - ADULT  Goal: Glucose maintained within prescribed range  Description: INTERVENTIONS:  - Monitor Blood Glucose as ordered  - Assess for signs and symptoms of hyperglycemia and hypoglycemia  - Administer ordered medications to maintain glucose within target range  - Assess barriers to adequate nutritional intake and initiate nutrition consult as needed  - Instruct patient on self management of diabetes  Outcome: Progressing     Problem: SKIN/TISSUE INTEGRITY - ADULT  Goal: Skin integrity remains intact  Description: INTERVENTIONS  - Assess and document risk factors for pressure ulcer development  - Assess and document skin integrity  - Monitor for areas of redness and/or skin breakdown  - Initiate interventions, skin care algorithm/standards of care as needed  Outcome: Progressing     Problem: HEMATOLOGIC - ADULT  Goal: Maintains hematologic stability  Description: INTERVENTIONS  - Assess for signs and symptoms of bleeding or hemorrhage  - Monitor labs and vital signs for trends  - Administer supportive blood products/factors, fluids and medications as ordered and appropriate  - Administer supportive blood products/factors as ordered and appropriate  Outcome: Progressing  Goal: Free from bleeding injury  Description: (Example usage: patient with low platelets)  INTERVENTIONS:  - Avoid intramuscular injections, enemas and rectal medication administration  - Ensure safe mobilization of patient  - Hold pressure on venipuncture sites to achieve adequate hemostasis  - Assess for signs and symptoms of internal bleeding  - Monitor lab trends  - Patient is to report abnormal signs of bleeding to staff  - Avoid use of toothpicks and dental floss  - Use electric shaver for shaving  - Use soft bristle tooth brush  - Limit straining and forceful nose blowing  Outcome: Progressing

## 2025-06-23 NOTE — CONGREGATE LIVING REVIEW
[facility] Living Authorization    The [facility] Review Committee has reviewed this case and the patient IS APPROVED for discharge to a facility for Short Term Skilled once the following procedure is followed:     - The physician discharge instructions (contained within the JANAK note for SNF) must inlcude the below appropriate and approved COVID instructions to the facility    For questions regarding CLRC approval process, please contact the CM assigned to the case.  For questions regarding RN discharge workflow, please contact the unit Clinical Leader.

## 2025-06-23 NOTE — PROGRESS NOTES
Pt A&Ox4 on 2L nasal cannula, VSS, complaints of mild pain. Tremors to the head. Tolerating medications well per mar. Had a large black bowel movement. 1 unit of blood was finishing at the start of my shift and another was ordered. Pt tolerating well. Npo at midnight. Call light within reach, frequent checks made, needs met. Pt npo at midnight. Hgb recheck was 10.2

## 2025-06-23 NOTE — PROGRESS NOTES
[facility]  Progress Note    Shi Dhillon Patient Status:  Inpatient    1935 MRN [id number]   Location [facility] 4NW-A Attending Dheeraj Gregory, DO   Hosp Day # 2 PCP Breonna Cabral MD     Subjective:  Shi Dhillon is a(n) 89 year old female.         Current complaints: denies new issues    No family at bedside , w/ pt permission I did call and discuss labs, clinical course, risk of endoscopy etc w/ daughter (POA --lillie)    Had \"dark stool\" overnight, denies further red bleeding    Pt denies new issues aside from thirst    Risk of egd and colonoscopy including prep, sedation, bleeding, perforation, infection, miss rate or issues with accuracy, etc and possible morbidity/mortalty discussed.  We discussed risk, benefit, alternatives, limitations as well as not having the procedures.  All questions answered, pt/daughter demonstrated understanding.        Current Hospital Medications[1]     Objective:    BP (!) 161/58 (BP Location: Right arm)   Pulse 89   Temp 98.1 °F (36.7 °C) (Oral)   Resp 16   Ht 5' 1\" (1.549 m)   Wt 117 lb (53.1 kg)   SpO2 98%   BMI 22.11 kg/m²   General appearance: alert, appears stated age, and cooperative  Lungs: clear to auscultation bilaterally  Heart: reg rate   Abdomen: active bowel sounds, mild distension, no bruits.  No pain w/ palpation. No krystian/guarding and no rigidity   Extremities: no edema  Neurologic: Grossly normal, x3        Labs:     Recent Labs   Lab 25  0550 25  1459 25  0817 25  1414 25  0226   RBC 1.40*  --  2.23*  --  3.29*   HGB 4.8*   < > 7.1*   < > 10.2*   HCT 14.4*  --  20.7*  --  29.5*   .9*  --  92.8  --  89.7   MCH 34.3*  --  31.8  --  31.0   MCHC 33.3  --  34.3  --  34.6   RDW 12.9  --  18.2  --  17.0   NEPRELIM 8.63*  --  16.54*  --   --    WBC 10.9  --  22.0*  --  20.0*   .0  --  141.0*  --  132.0*    < > = values in this interval not displayed.       Lab Results   Component  Value Date    CREATSERUM 2.86 06/23/2025    BUN 72 06/23/2025     06/23/2025    K 3.9 06/23/2025     06/23/2025    CO2 21.0 06/23/2025     06/23/2025    CA 8.7 06/23/2025    MG 2.0 06/23/2025             )    Lab Results   Component Value Date    PGLU 128 06/23/2025           Assessment and Plan:  Problem List[2]    1 acute blood loss anemia  2 rectal bleeding  3 melena  4 colitis    Initial presentation was bright red blood, more melena now.  Ct w/ suggestion of colitis, risk of infx, ischemia etc discussed.  Discussed w/ pt/daughter limits in CT     Still having \"dark stool\", bowel sounds active and may still have ongoing bleeding or other process that puts her at risk    They continue to want only conservative treatment/eval for now    Currently they did not want any procedure interventions, endoscopy, surgery etc for now.  Risk of ongoing bleeding and morbidity/mortality reviewed    Multiple options for how to proceed were discussed in detail with pt/family, they are agreeable to the following plan ...  --serial hg  --cont ppi  --stool gi pcr  --empiric ppi at 2x/day  --on empiric flagyl    They will decide if want to consider hospice or other goals of care        Pt/daughter demonstrated understanding of risks of morbidity/mortality if diagnoses and/or treatments were delayed balanced against risks of further investigation and/or treatment.     --daughter/patient demonstrated understanding of the results and recommendations and risks, benefits, limitations, and alternatives to the plan. All of their questions and concerns were addressed and were agreeable to the plan as listed      Babatunde Gibbons MD           [1]   Current Facility-Administered Medications   Medication Dose Route Frequency    metroNIDAZOLE (Flagyl) tab 500 mg  500 mg Oral BID    acetaminophen (Tylenol) tab 650 mg  650 mg Oral Q4H PRN    pantoprazole (Protonix) 40 mg in sodium chloride 0.9% PF 10 mL IV push  40 mg  Intravenous Q12H    ondansetron (Zofran) 4 MG/2ML injection 4 mg  4 mg Intravenous Q6H PRN    insulin aspart (NovoLOG) 100 Units/mL FlexPen 1-5 Units  1-5 Units Subcutaneous TID AC and HS   [2]   Patient Active Problem List  Diagnosis    Fall    Gastrointestinal hemorrhage with hematemesis    History of breast cancer    Low back pain    NSAID induced gastritis    Type 2 diabetes mellitus without complication, with long-term current use of insulin (HCC)    Lumbar spondylosis    Spinal stenosis of lumbar region with neurogenic claudication    Diabetic polyneuropathy associated with type 2 diabetes mellitus ([facility])    Gait disturbance    Chronic pain of both shoulders    Effusion of joint of right shoulder    Primary osteoarthritis of right shoulder    Chronic pain syndrome    Hyponatremia    Anemia    Hyperglycemia    Acute on chronic congestive heart failure, unspecified heart failure type ([facility])    Hypoxia    Left foot pain    Nausea and vomiting, unspecified vomiting type    Gastrointestinal hemorrhage, unspecified gastrointestinal hemorrhage type    Anemia, unspecified type

## 2025-06-24 ENCOUNTER — TELEPHONE (OUTPATIENT)
Dept: INFUSION THERAPY | Age: 89
End: 2025-06-24

## 2025-06-24 DIAGNOSIS — D63.8 ANEMIA OF CHRONIC DISEASE: Primary | ICD-10-CM

## 2025-06-24 PROBLEM — G25.0 BENIGN ESSENTIAL TREMOR: Status: ACTIVE | Noted: 2025-06-24

## 2025-06-24 LAB
ANION GAP SERPL CALC-SCNC: 9 MMOL/L (ref 0–18)
BASOPHILS # BLD AUTO: 0.05 X10(3) UL (ref 0–0.2)
BASOPHILS NFR BLD AUTO: 0.4 %
BLOOD TYPE BARCODE: 6200
BUN BLD-MCNC: 57 MG/DL (ref 9–23)
CALCIUM BLD-MCNC: 8.5 MG/DL (ref 8.7–10.6)
CHLORIDE SERPL-SCNC: 112 MMOL/L (ref 98–112)
CO2 SERPL-SCNC: 21 MMOL/L (ref 21–32)
CREAT BLD-MCNC: 2.58 MG/DL (ref 0.55–1.02)
EGFRCR SERPLBLD CKD-EPI 2021: 17 ML/MIN/1.73M2 (ref 60–?)
EOSINOPHIL # BLD AUTO: 0.06 X10(3) UL (ref 0–0.7)
EOSINOPHIL NFR BLD AUTO: 0.4 %
ERYTHROCYTE [DISTWIDTH] IN BLOOD BY AUTOMATED COUNT: 17.9 %
GLUCOSE BLD-MCNC: 134 MG/DL (ref 70–99)
GLUCOSE BLD-MCNC: 148 MG/DL (ref 70–99)
GLUCOSE BLD-MCNC: 215 MG/DL (ref 70–99)
GLUCOSE BLD-MCNC: 247 MG/DL (ref 70–99)
GLUCOSE BLD-MCNC: 395 MG/DL (ref 70–99)
HCT VFR BLD AUTO: 24.7 % (ref 35–48)
HGB BLD-MCNC: 8.1 G/DL (ref 12–16)
HGB BLD-MCNC: 8.2 G/DL (ref 12–16)
HGB BLD-MCNC: 8.4 G/DL (ref 12–16)
HGB BLD-MCNC: 9.1 G/DL (ref 12–16)
IMM GRANULOCYTES # BLD AUTO: 0.16 X10(3) UL (ref 0–1)
IMM GRANULOCYTES NFR BLD: 1.1 %
LYMPHOCYTES # BLD AUTO: 0.98 X10(3) UL (ref 1–4)
LYMPHOCYTES NFR BLD AUTO: 6.9 %
MAGNESIUM SERPL-MCNC: 1.9 MG/DL (ref 1.6–2.6)
MCH RBC QN AUTO: 31.2 PG (ref 26–34)
MCHC RBC AUTO-ENTMCNC: 33.2 G/DL (ref 31–37)
MCV RBC AUTO: 93.9 FL (ref 80–100)
MONOCYTES # BLD AUTO: 1.49 X10(3) UL (ref 0.1–1)
MONOCYTES NFR BLD AUTO: 10.6 %
NEUTROPHILS # BLD AUTO: 11.38 X10 (3) UL (ref 1.5–7.7)
NEUTROPHILS # BLD AUTO: 11.38 X10(3) UL (ref 1.5–7.7)
NEUTROPHILS NFR BLD AUTO: 80.6 %
OSMOLALITY SERPL CALC.SUM OF ELEC: 312 MOSM/KG (ref 275–295)
PLATELET # BLD AUTO: 122 10(3)UL (ref 150–450)
POTASSIUM SERPL-SCNC: 3.5 MMOL/L (ref 3.5–5.1)
RBC # BLD AUTO: 2.63 X10(6)UL (ref 3.8–5.3)
SODIUM SERPL-SCNC: 142 MMOL/L (ref 136–145)
UNIT VOLUME: 350 ML
WBC # BLD AUTO: 14.1 X10(3) UL (ref 4–11)

## 2025-06-24 PROCEDURE — 99223 1ST HOSP IP/OBS HIGH 75: CPT | Performed by: OTHER

## 2025-06-24 RX ORDER — POTASSIUM CHLORIDE 1.5 G/1.58G
40 POWDER, FOR SOLUTION ORAL ONCE
Status: COMPLETED | OUTPATIENT
Start: 2025-06-24 | End: 2025-06-24

## 2025-06-24 NOTE — PLAN OF CARE
Patient is A/O x 4. VSS. Afebrile. No complaints of pain. Room air. Clear liquid diet; tolerating. Patient up in chair per PT. Patient has had several loose maroon stools, see flowsheets. MDs aware. Hgb Q6, see results. BG coverage per sliding scale. Incontinent of both bladder and bowel.Electrolytes replaced per protocol. All medications given per MAR. IVF infusing per orders.  Safety precautions in place.  Call light within reach. Family at bedside.    Problem: METABOLIC/FLUID AND ELECTROLYTES - ADULT  Goal: Glucose maintained within prescribed range  Description: INTERVENTIONS:  - Monitor Blood Glucose as ordered  - Assess for signs and symptoms of hyperglycemia and hypoglycemia  - Administer ordered medications to maintain glucose within target range  - Assess barriers to adequate nutritional intake and initiate nutrition consult as needed  - Instruct patient on self management of diabetes  Outcome: Progressing     Problem: SKIN/TISSUE INTEGRITY - ADULT  Goal: Skin integrity remains intact  Description: INTERVENTIONS  - Assess and document risk factors for pressure ulcer development  - Assess and document skin integrity  - Monitor for areas of redness and/or skin breakdown  - Initiate interventions, skin care algorithm/standards of care as needed  Outcome: Progressing     Problem: HEMATOLOGIC - ADULT  Goal: Maintains hematologic stability  Description: INTERVENTIONS  - Assess for signs and symptoms of bleeding or hemorrhage  - Monitor labs and vital signs for trends  - Administer supportive blood products/factors, fluids and medications as ordered and appropriate  - Administer supportive blood products/factors as ordered and appropriate  Outcome: Progressing  Goal: Free from bleeding injury  Description: (Example usage: patient with low platelets)  INTERVENTIONS:  - Avoid intramuscular injections, enemas and rectal medication administration  - Ensure safe mobilization of patient  - Hold pressure on venipuncture  sites to achieve adequate hemostasis  - Assess for signs and symptoms of internal bleeding  - Monitor lab trends  - Patient is to report abnormal signs of bleeding to staff  - Avoid use of toothpicks and dental floss  - Use electric shaver for shaving  - Use soft bristle tooth brush  - Limit straining and forceful nose blowing  Outcome: Progressing

## 2025-06-24 NOTE — CONSULTS
[facility]   NEUROLOGY   CONSULT NOTE    Admission date: 6/21/2025  Reason for Consult: Tremor  Chief Complaint:   Chief Complaint   Patient presents with    GI Bleeding    Nausea/Vomiting/Diarrhea   ________________________________________________________________    History     History of Presenting Illness  89 year old female with multiple medical problems including hypertension, hyperlipidemia and longstanding history of essential tremor, mostly wheelchair-bound, currently being treated for anemia, CHF, consulted on request from physical therapist for tremor.  Daughter available at the time of visit.  Patient has longstanding history of tremors which has not increased in frequency, severity or duration.  Patient also has mild titubation.    History obtained from patient, daughter, chart review.    Past Medical History[1]  Past Surgical History[2]  Short Social Hx on File[3]  Family History[4]  Allergies Allergies[5]    Home Meds  Current Outpatient Medications   Medication Instructions    acetaminophen (TYLENOL) 650 mg, 2 times daily    amLODIPine (NORVASC) 5 mg, Daily    aspirin 81 mg, Daily    atorvastatin (LIPITOR) 10 mg, Daily    bisacodyl (DULCOLAX) 10 mg, Rectal, Daily as needed    bumetanide 2 MG Oral Tab Take 1 tablet (2 mg total) by mouth daily.    carvedilol (COREG) 6.25 mg, 2 times daily with meals    cholecalciferol (VITAMIN D3) 4,000 Units, Daily    cyanocobalamin (VITAMIN B12) 1,000 mcg, Daily    DULoxetine 20 MG Oral Cap DR Particles Take 1 capsule (20 mg total) by mouth daily.    empagliflozin (JARDIANCE) 10 MG Oral Tab Daily    Ferrous Fumarate 325 mg, Daily    gabapentin (NEURONTIN) 100 mg, Oral, Nightly    glimepiride (AMARYL) 2 mg, Every morning before breakfast    HumaLOG Arya KwikPen 0-6 Units, Subcutaneous, 2 times daily, Below 200 = 0 units  201-250 = 2 units  251-300 = 4 units  301-350 = 6 units  351-400 = 8 units  Call MD if blood sugar > 350    hydrALAZINE 100 MG  Oral Tab Take 1 tablet (100 mg total) by mouth 3 (three) times daily.    isosorbide mononitrate ER 30 MG Oral Tablet 24 Hr 1 tablet, Daily    lidocaine-menthol 4-1 % External Patch 1 patch, Transdermal, Daily, Left foot    magnesium 250 MG Oral Tab 2 [id number] with a meal Orally twice a day    meclizine (ANTIVERT) 25 mg, As Directed    ondansetron (ZOFRAN) 4 mg, Every 8 hours PRN    Polyethylene Glycol 3350 (MIRALAX) 17 g, Oral, Daily    saccharomyces boulardii (FLORASTOR) 250 mg, 2 times daily    senna-docusate 8.6-50 MG Oral Tab 2 tablets, Oral, DAILY PRN    traMADol (ULTRAM) 50 mg, Every 6 hours PRN     Scheduled Meds:Scheduled Medications[6]  Continuous Infusions:Medication Infusions[7]  PRN Meds:PRN Medications[8]    OBJECTIVE   VITAL SIGNS:   Temp:  [98.3 °F (36.8 °C)-99.4 °F (37.4 °C)] 98.3 °F (36.8 °C)  Pulse:  [] 113  Resp:  [16-20] 20  BP: (132-147)/(56-72) 136/72  SpO2:  [94 %-97 %] 97 %    PHYSICAL EXAM:    NEUROLOGIC:    Mental Status:  A&O x 3, Follows simple commands, no obvious aphasia or dysarthria  Cranial nerves: PERRL.  Visual fields full.  EOMI.  Face symmetric with normal movement bilaterally.  Hearing grossly intact. Tongue midline with normal movements.   Motor: Drift:  Absent; Motor exam is 5 out of 5 in both upper extremities.  Action tremors noted without cogwheeling.   Sensation: Intact to light touch bilaterally  Cerebellar: Normal Finger-To-Nose test      LABORATORY DATA:  Last 24 hour labs were reviewed in detail.  Recent Labs   Lab 06/22/25  0824 06/23/25  0226 06/24/25  0615    139 142   K 4.2 3.9 3.5    106 112   CO2 21.0 21.0 21.0   * 111* 134*   BUN 72* 72* 57*   CREATSERUM 3.26* 2.86* 2.58*     Recent Labs   Lab 06/22/25  0817 06/22/25  1414 06/23/25  0226 06/23/25  1159 06/23/25  1753 06/24/25  0014 06/24/25  0615   WBC 22.0*  --  20.0*  --   --   --  14.1*   HGB 7.1*   < > 10.2*   < > 8.3* 8.1* 8.2*   .0*  --  132.0*  --   --   --  122.0*    < > =  values in this interval not displayed.     Recent Labs   Lab 06/21/25  0550   ALT 8*   AST 10     Recent Labs   Lab 06/23/25  0226 06/24/25  0615   MG 2.0 1.9     Last A1c value was 6.8% done 3/4/2024.    Radiology:    CT scan of the head 1 year ago reported chronic ischemic small vessel changes.  ASSESSMENT/PLAN   89 year old female with:    Signs symptoms of benign essential tremor.  Patient has the symptoms for the last many years.  Patient and daughter at this time not interested in trying medications for alleviation of symptoms.  Daughter feels that patient is able to cope well with the tremor and states that they will consider medical intervention when she is more stable.  Advised the patient to follow-up outpatient with neurology if decides to consider medical intervention.  No further neurological recommendations at this time.  Please feel free to call for further queries.    Principal Problem:    Gastrointestinal hemorrhage, unspecified gastrointestinal hemorrhage type  Active Problems:    Anemia, unspecified type       Yaya Ramírez MD  Neurohospitalist  [facility]    Disclaimer: This record was dictated using Dragon software. There may be errors due to voice recognition problems that were not realized and corrected during the completion of the note.           [1]   Past Medical History:   Back pain    Diabetes (HCC)    Essential hypertension    High blood pressure    High cholesterol    Osteoarthritis    Pneumonia due to organism   [2] No past surgical history on file.  [3]   Social History  Socioeconomic History    Marital status:    Tobacco Use    Smoking status: Never    Smokeless tobacco: Never   Vaping Use    Vaping status: Never Used   Substance and Sexual Activity    Alcohol use: Never    Drug use: Never   Other Topics Concern    Caffeine Concern Yes    Exercise No   Social History Narrative    The patient uses the following assistive device(s):  quad cane, rolling  walker and wheelchair.      The patient does not live in a home with stairs.     Social Drivers of Health     Food Insecurity: No Food Insecurity (6/21/2025)    NCSS - Food Insecurity     Worried About Running Out of Food in the Last Year: No     Ran Out of Food in the Last Year: No   Transportation Needs: No Transportation Needs (6/21/2025)    NCSS - Transportation     Lack of Transportation: No   Housing Stability: Not At Risk (6/21/2025)    NCSS - Housing/Utilities     Has Housing: Yes     Worried About Losing Housing: No     Unable to Get Utilities: No   [4] No family history on file.  [5]   Allergies  Allergen Reactions    Codeine NAUSEA ONLY     Nausea   [6]    metroNIDAZOLE  500 mg Oral BID    pantoprazole  40 mg Intravenous Q12H    insulin aspart  1-5 Units Subcutaneous TID AC and HS   [7]    lactated ringers 50 mL/hr at 06/23/25 1712   [8]   acetaminophen    ondansetron

## 2025-06-24 NOTE — CM/SW NOTE
SW met with patient's daughter to provide her a copy of [facility] choice list. [facility] does not have any available beds at this time. She expressed understanding.     SW discussed [facility] vs. Hospice options and reviewed basic information for hospice. She would like time to continue thinking it over and to continue discussing with MD's.     SW will follow up for [facility] choice once closet to DC.     SW will continue to follow for plan of care changes and remain available for any additional DC needs or concerns.     Neelam Mcmillan MSW, LSW  Discharge Planner   [phone]

## 2025-06-24 NOTE — PAYOR COMM NOTE
--------------  ADMISSION REVIEW     Payor: [employer] PPO  Subscriber #:  [id number]  Authorization Number: [id number]    Admit date: 6/21/25  Admit time:  8:09 AM       REVIEW DOCUMENTATION:     ED Provider Notes        ED Provider Notes signed by Sophy Martínez MD at 6/21/2025  7:16 AM       Author: Sophy Martínez MD Service: Emergency Medicine Author Type: Physician    Filed: 6/21/2025  7:16 AM Date of Service: 6/21/2025  5:54 AM Status: Addendum    : Sophy Martínez MD (Physician)    Related Notes: Original Note by Sophy Martínez MD (Physician) filed at 6/21/2025  7:16 AM           Patient Seen in: [facility] Emergency Department        History  Chief Complaint   Patient presents with    GI Bleeding    Nausea/Vomiting/Diarrhea     Stated Complaint: GI Bleed; N/V/D    Subjective:   HPI            The patient is a 89-year-old female presented to the emergency room with reports bloody stools.  The patient is coming from [place].  She tells me she started feeling unwell yesterday after lunch.  She notes she had a bowel movement yesterday but does not know if it was bloody.  On arrival here patient has clear emesis on her chest.  She complains of pain in the right side of her abdomen underneath her right breast.  Patient has dark red bloody stools in her diaper and on her legs.  Patient is not on blood thinners              Physical Exam    ED Triage Vitals [06/21/25 0543]   /76   Pulse 91   Resp 25   Temp 97.9 °F (36.6 °C)   Temp src Oral   SpO2 100 %   O2 Device None (Room air)       Current Vitals:   Vital Signs  BP: 118/47  Pulse: 88  Resp: 16  Temp: 97.6 °F (36.4 °C)  Temp src: Temporal  MAP (mmHg): 69    Oxygen Therapy  SpO2: 96 %  O2 Device: Nasal cannula  O2 Flow Rate (L/min): 3 L/min            Physical Exam  Vitals and nursing note reviewed.   Constitutional:       Appearance: Normal appearance. She is ill-appearing.   HENT:      Nose: Nose normal.      Mouth/Throat:      Mouth:  Mucous membranes are moist.   Eyes:      Extraocular Movements: Extraocular movements intact.      Pupils: Pupils are equal, round, and reactive to light.   Cardiovascular:      Rate and Rhythm: Normal rate and regular rhythm.      Pulses: Normal pulses.   Pulmonary:      Effort: Pulmonary effort is normal.   Abdominal:      General: Abdomen is flat. There is no distension.      Palpations: Abdomen is soft.      Tenderness: There is generalized abdominal tenderness. There is no right CVA tenderness, left CVA tenderness, guarding or rebound.      Hernia: No hernia is present.   Musculoskeletal:         General: No swelling or tenderness. Normal range of motion.      Cervical back: Normal range of motion.   Skin:     General: Skin is warm and dry.      Coloration: Skin is pale.   Neurological:      Mental Status: She is alert and oriented to person, place, and time. Mental status is at baseline.   Psychiatric:         Mood and Affect: Mood normal.                 ED Course  Labs Reviewed   COMP METABOLIC PANEL (14) - Abnormal; Notable for the following components:       Result Value    Glucose 335 (*)     Sodium 135 (*)     BUN 77 (*)     Creatinine 2.93 (*)     Calcium, Total 8.4 (*)     Calculated Osmolality 316 (*)     eGFR-Cr 15 (*)     ALT 8 (*)     Alkaline Phosphatase 53 (*)     Bilirubin, Total <0.2 (*)     Total Protein 5.2 (*)     Globulin  1.8 (*)     All other components within normal limits   CBC WITH DIFFERENTIAL WITH PLATELET - Abnormal; Notable for the following components:    RBC 1.40 (*)     HGB 4.8 (*)     HCT 14.4 (*)     .9 (*)     MCH 34.3 (*)     Neutrophil Absolute Prelim 8.63 (*)     Neutrophil Absolute 8.63 (*)     All other components within normal limits   LIPASE - Normal   TYPE AND SCREEN    Narrative:     The following orders were created for panel order Type and screen.  Procedure                               Abnormality         Status                     ---------                                -----------         ------                     ABORH (Blood Type)[[id number]]                               Final result               Antibody Screen[[id number]]                                  Final result                 Please view results for these tests on the individual orders.   ABORH (BLOOD TYPE)   ANTIBODY SCREEN   PREPARE RBC   ABORH CONFIRMATION   RAINBOW DRAW LAVENDER   RAINBOW DRAW LIGHT GREEN   RAINBOW DRAW BLUE   GI STOOL PANEL BY PCR   C. DIFFICILE(TOXIGENIC)PCR     EKG    Rate, intervals and axes as noted on EKG Report.  Rate: 92  Rhythm: Sinus Rhythm  Reading: no nicole, dep in lateral leads                Medical Decision Making    The differential includes the following  Lower GI bleed secondary to colitis, malignancy, diverticular bleed  Anemia     Pertinent comorbidities include  As detailed above     Pertinent social history includes  As detailed above     Labs  Hb is 4.8         External data reviewed  Prior labs from May 28 showing hemoglobin of 8.4, baseline over last year between 7-8  Baseline creatinine appears to be between 2 and 3 with GFR of 17  Care everywhere [place] 2020 note EGD results friable stomach mucosa, biopsy + h pylori   no cscope      Discussion of management with external providers  Dr. Stacia Mayer GI   Pt's daughter who reports she had similar prior episode in 2020    ER course  On arrival patient normotensive nontachycardic afebrile.  She is ill-appearing and pale but is able to provide history as she is alert and oriented x 4.  She has slight diffuse tenderness to palpation.  Patient covered in dark red bloody stools. Cleaned.      6:22 AM  Pt /76, HR 91. She is alert. Hb is 4.8. Discussed with GI Dr. Palomo. He recommends CT of eval for ischemic colitis. Pt prior cmp from 5/28 reveals  GFR of 17. Will need ct w/o contrast.     6:34 AM  Pt taken to ct, no recurrent episodes as of yet. Hospitalist paged.     6:53 AM  Pt back from CT.  She reports she  feels sick. No recurrent episodes. Blood transfusion starting. Endorsed to hospitalist    7:01 AM  Vision radiologist - limited study Cystic structure encircling rectum, Thickening of transverse colon. Daughter states she was told mother had mass/cyst but it did not require intervention.   Formal CT report below   CT abdomen/pelvis      IMPRESSION:    Loculated cystic attenuation structure in the pelvis encircling the rectum extending from the bladder to the presacral space, roughly measuring 12 x 10 x 12 cm.  Evaluation is somewhat limited without IV contrast and as a result beam-hardening artifact from a total hip arthroplasty.  Patient has reportedly had a hysterectomy.  Unclear if this is cystic neoplasm, inclusion cyst, cystic lymphangioma...etc. Consider GYN consultation.    Thickened ascending, transverse and proximal descending colon.  Perhaps mild colitis.  Correlate with inflammatory markers and lactic acid.    No bowel obstruction or free gas  Diverticulosis but no diverticulitis  No appendicitis    Biliary dilatation status post cholecystectomy.  Likely reservoir effect.  Pancreatic atrophy; no main duct dilatation  Renal atrophy; no hydronephrosis or obstructing urolithiasis.    Generalized severe atherosclerosis, including extensive coronary calcifications.  No AAA.    Areas of clustered micronodularity in the lung bases, could be atypical infectious, granulomatous processes or lymphangitic spread of tumor.  There also appears to be bronchial thickening.  Correlate for respiratory symptoms.    Prior left mastectomy    Advanced severe spine degenerative changes    Discussed with Dr. Martínez at 7:59 AM ET    Daniel Wright M.D.  This report has been electronically signed and verified by the Radiologist whose name is printed above.    A total of 60 minutes of critical care time (exclusive of billable procedures) was administered to manage the patient's critical lab values due to her gi bleed leading to anemia.   This involved direct patient intervention, complex decision making, and/or extensive discussions with the patient, family, and clinical staff.      Disposition and Plan     Clinical Impression:  1. Gastrointestinal hemorrhage, unspecified gastrointestinal hemorrhage type    2. Anemia, unspecified type             Signed by Sophy Martínez MD on 6/21/2025  7:16 AM       6/21 Hospitalist    History of Present Illness: Patient is a 89 year old female with with history of HTN, HLD, CAD, type II DM with neuropathy, and depression presented for acute rectal bleeding. History limited due to patient condition. Daughter at bedside providing additional history. States she was noted by AL facility that patient began to have bright red rectal bleeding early this morning. Reportedly, was doing well until this incident. Per baseline, patient requires assistance with ADLs, using wheelchair.      Admission Hgb 4 (previously with baseline ~8 on 5/28/25). CT with possible diffuse colitis. C diff and stool testing pending. Admitted for management.      Medical History:  [Past Medical History]     [Past Medical History]   Back pain    Diabetes (HCC)    Essential hypertension    High blood pressure    High cholesterol    Osteoarthritis    Pneumonia due to organism          Review of Systems  Comprehensive ROS reviewed and negative except for what is stated in HPI.       OBJECTIVE:  /40 (BP Location: Right arm)   Pulse 101   Temp 97.9 °F (36.6 °C) (Temporal)   Resp 22   Ht 5' 1\" (1.549 m)   Wt 117 lb 1 oz (53.1 kg)   SpO2 97%   BMI 22.12 kg/m²   General: No apparent distress.   HEENT:  EOMI, PERRLA.  Pulm: Normal respiratory effort.  CV: Regular rate and rhythm, no murmur.   Abd: Soft, nontender, nondistended.   MSK: Moves extremities spontaneously .  Skin: No lesions or rashes.  Neuro: Limited exam.      Data Review:    LABS:         Lab Results   Component Value Date     WBC 10.9 06/21/2025     HGB 4.8 06/21/2025     HCT  14.4 06/21/2025     .0 06/21/2025     CREATSERUM 2.93 06/21/2025     BUN 77 06/21/2025      06/21/2025     K 4.4 06/21/2025     CL 99 06/21/2025     CO2 22.0 06/21/2025      06/21/2025     CA 8.4 06/21/2025     ALB 3.4 06/21/2025     ALKPHO 53 06/21/2025     BILT <0.2 06/21/2025     TP 5.2 06/21/2025     AST 10 06/21/2025     ALT 8 06/21/2025     LIP 45 06/21/2025         All lab work and imaging personally reviewed.     Assessment/Plan:      Assessment/ Plan:      Patient is a 89 year old female with with history of HTN, HLD, CAD, type II DM with neuropathy, and depression presented for acute rectal bleeding.      #Acute on chronic anemia 2/2 rectal bleeding  -CT with diffuse colitis, C-diff and stool testing pending  -STAT prbc transfusion  -trend Hgb accordingly   -empiric IV flagyl per GI  -maintain NPO  -monitor hemodynamics- labile BP when seen will reevaluate with transfusion. Daughter POA at bedside agreeable to transfer to ICU if it is required.      #CKD  -monitor      #HTN  #HLD  #CAD  #Type II DM  -accuchecks, holding home meds for NPO, verify med rec      Advanced Care Planning     While discussing goals of care with the patient and their family, Patients POA/ daughter voluntarily participated in an advanced care planning discussion. The following was discussed: DNR.     16 minutes were spent in discussing advanced care planning. This time was exclusive of the documented time for this visit.     DVT ppx: SCDs  Diet: NPO  Disposition: depending clinical course  Code status: DNR      Outpatient records or previous hospital records reviewed.   [facility] hospitalist to continue to follow patient while in house.     Kwaku Monroe DO  [facility] Hospitalist            Electronically signed by Kwaku Monroe DO at 6/21/2025  9:37 AM    6/22 GI    Subjective:  Shi Dhillon is a(n) 89 year old female.Pt with no further BMs per RN.  Pt states feels better no abd pain  ROS: No  NV.     Objective:  Blood pressure 101/30, pulse 77, temperature 98 °F (36.7 °C), temperature source Axillary, resp. rate 20, height 5' 1\" (1.549 m), weight 117 lb (53.1 kg), SpO2 98%.  Physical Exam:  GEN: well developed, well nourished, NAD  HEENT: non-icteric   LUNGS: CTA  CARDIO: RRR  GI: good BS's,no masses, HSM or tenderness RECTAL: Exam not done. EXTREM.: no edema NEURO: A + O        Labs:         Lab Results   Component Value Date     WBC 22.0 06/22/2025     HGB 7.1 06/22/2025     HCT 20.7 06/22/2025     .0 06/22/2025     CREATSERUM 3.26 06/22/2025     BUN 72 06/22/2025      06/22/2025     K 4.2 06/22/2025      06/22/2025     CO2 21.0 06/22/2025      06/22/2025     CA 8.6 06/22/2025     PGLU 152 06/22/2025         Hb= 7.1 <= 7.9 <= 8.9 < Tx 2 U PRBC <= 4.9      Shi Dhillon is a(n) 89 year old female. Pt with recent rectal bleeding.  No further bleeding, Hb decrease but stable. 2 units given so far Hb trend stable.  Non contrast CT likely colitis.  No BMs for Cx C dif.  DNR conservative management. No role for endoscopy        Plan:     1.  Clears today advance as tolerated  2.  Cont Abx 5 more days.  3.  Cont PO daily PPI  4.  Cancel C dif.  5.  No further eval planned  6.  DC when stable, FU as needed     Discussed with daughter     Total time spent on patient care:  12 minutes     Armando Palomo MD  6/22/2025  9:11 AM         Pt with black stool Hb 6.8  Transfuse.     Still; will hold on further evaluation unless signs of ongoing bleeding needing intervention.     Clear liquid  Follow labs  Cont PPI with re assessment in AM     DW POA daughter Still opting not be very aggressive unless needed      Electronically signed by Armando Palomo MD at 6/22/2025  5:40 PM    6/23 GI    Subjective:  Shi Dhillon is a(n) 89 year old female.           Current complaints: denies new issues     No family at bedside , w/ pt permission I did call and discuss labs, clinical course,  risk of endoscopy etc w/ daughter (POA --lillie)     Had \"dark stool\" overnight, denies further red bleeding     Pt denies new issues aside from thirst     Risk of egd and colonoscopy including prep, sedation, bleeding, perforation, infection, miss rate or issues with accuracy, etc and possible morbidity/mortalty discussed.  We discussed risk, benefit, alternatives, limitations as well as not having the procedures.  All questions answered, pt/daughter demonstrated understanding.           [Current Hospital Medications]     [Current Hospital Medications]         Current Facility-Administered Medications   Medication Dose Route Frequency    metroNIDAZOLE (Flagyl) tab 500 mg  500 mg Oral BID    acetaminophen (Tylenol) tab 650 mg  650 mg Oral Q4H PRN    pantoprazole (Protonix) 40 mg in sodium chloride 0.9% PF 10 mL IV push  40 mg Intravenous Q12H    ondansetron (Zofran) 4 MG/2ML injection 4 mg  4 mg Intravenous Q6H PRN    insulin aspart (NovoLOG) 100 Units/mL FlexPen 1-5 Units  1-5 Units Subcutaneous TID AC and HS        Objective:     BP (!) 161/58 (BP Location: Right arm)   Pulse 89   Temp 98.1 °F (36.7 °C) (Oral)   Resp 16   Ht 5' 1\" (1.549 m)   Wt 117 lb (53.1 kg)   SpO2 98%   BMI 22.11 kg/m²   General appearance: alert, appears stated age, and cooperative  Lungs: clear to auscultation bilaterally  Heart: reg rate   Abdomen: active bowel sounds, mild distension, no bruits.  No pain w/ palpation. No krystian/guarding and no rigidity   Extremities: no edema  Neurologic: Grossly normal, x3           Labs:              Recent Labs   Lab 06/21/25  0550 06/21/25  1459 06/22/25  0817 06/22/25  1414 06/23/25  0226   RBC 1.40*  --  2.23*  --  3.29*   HGB 4.8*   < > 7.1*   < > 10.2*   HCT 14.4*  --  20.7*  --  29.5*   .9*  --  92.8  --  89.7   MCH 34.3*  --  31.8  --  31.0   MCHC 33.3  --  34.3  --  34.6   RDW 12.9  --  18.2  --  17.0   NEPRELIM 8.63*  --  16.54*  --   --    WBC 10.9  --  22.0*  --  20.0*   .0   --  141.0*  --  132.0*    < > = values in this interval not displayed.               Lab Results   Component Value Date     CREATSERUM 2.86 06/23/2025     BUN 72 06/23/2025      06/23/2025     K 3.9 06/23/2025      06/23/2025     CO2 21.0 06/23/2025      06/23/2025     CA 8.7 06/23/2025     MG 2.0 06/23/2025             )           Lab Results   Component Value Date     PGLU 128 06/23/2025               Assessment and Plan:  [Problem List]    [Problem List]  Patient Active Problem List      Diagnosis    Fall    Gastrointestinal hemorrhage with hematemesis    History of breast cancer    Low back pain    NSAID induced gastritis    Type 2 diabetes mellitus without complication, with long-term current use of insulin ([facility])    Lumbar spondylosis    Spinal stenosis of lumbar region with neurogenic claudication    Diabetic polyneuropathy associated with type 2 diabetes mellitus (HCC)    Gait disturbance    Chronic pain of both shoulders    Effusion of joint of right shoulder    Primary osteoarthritis of right shoulder    Chronic pain syndrome    Hyponatremia    Anemia    Hyperglycemia    Acute on chronic congestive heart failure, unspecified heart failure type (HCC)    Hypoxia    Left foot pain    Nausea and vomiting, unspecified vomiting type    Gastrointestinal hemorrhage, unspecified gastrointestinal hemorrhage type    Anemia, unspecified type        1 acute blood loss anemia  2 rectal bleeding  3 melena  4 colitis     Initial presentation was bright red blood, more melena now.  Ct w/ suggestion of colitis, risk of infx, ischemia etc discussed.  Discussed w/ pt/daughter limits in CT      Still having \"dark stool\", bowel sounds active and may still have ongoing bleeding or other process that puts her at risk     They continue to want only conservative treatment/eval for now     Currently they did not want any procedure interventions, endoscopy, surgery etc for now.  Risk of ongoing bleeding and  morbidity/mortality reviewed     Multiple options for how to proceed were discussed in detail with pt/family, they are agreeable to the following plan ...  --serial hg  --cont ppi  --stool gi pcr  --empiric ppi at 2x/day  --on empiric flagyl     They will decide if want to consider hospice or other goals of care           Pt/daughter demonstrated understanding of risks of morbidity/mortality if diagnoses and/or treatments were delayed balanced against risks of further investigation and/or treatment.      --daughter/patient demonstrated understanding of the results and recommendations and risks, benefits, limitations, and alternatives to the plan. All of their questions and concerns were addressed and were agreeable to the plan as listed        Babatunde Gibbons MD                  Electronically signed by Babatunde Gibbons MD at 6/23/2025  8:50 AM    6/24 GI       Current complaints: denies new issues     No family at bedside ,      Had \"dark stool\" overnight again,but she denies further red bleeding     Pt denies new issues aside from thirst     States abd pain/cramps seem improved     Risk of egd and colonoscopy including prep, sedation, bleeding, perforation, possible morbidity/mortalty discussed again as well as risk, benefit, alternatives, limitations as well as not having the procedures.  All questions answered, pt demonstrated understanding.     She again did not want endoscopic or other procedural interventions, wanted to be conservative      Spoke w/ Dr Gregory multiple times about her care yesterday as well     [Current Hospital Medications]     [Current Hospital Medications]         Current Facility-Administered Medications   Medication Dose Route Frequency    potassium chloride (Klor-Con) 20 MEQ oral powder 40 mEq  40 mEq Oral Once    lactated ringers infusion   Intravenous Continuous    metroNIDAZOLE (Flagyl) tab 500 mg  500 mg Oral BID    acetaminophen (Tylenol) tab 650 mg  650 mg Oral Q4H PRN     pantoprazole (Protonix) 40 mg in sodium chloride 0.9% PF 10 mL IV push  40 mg Intravenous Q12H    ondansetron (Zofran) 4 MG/2ML injection 4 mg  4 mg Intravenous Q6H PRN    insulin aspart (NovoLOG) 100 Units/mL FlexPen 1-5 Units  1-5 Units Subcutaneous TID AC and HS        Objective:     /72 (BP Location: Right arm)   Pulse 120   Temp 98.3 °F (36.8 °C) (Oral)   Resp 20   Ht 5' 1\" (1.549 m)   Wt 117 lb (53.1 kg)   SpO2 94%   BMI 22.11 kg/m²   General appearance: alert, appears stated age, and cooperative  Lungs: clear to auscultation bilaterally  Heart: reg rate   Abdomen: present bowel sounds but less active than 6/23, mild distension again, less than 6/23. No bruits.  No pain w/ palpation. No krystian/guarding and no rigidity   Extremities: no edema  Neurologic: Grossly normal, x3           Labs:              Recent Labs   Lab 06/22/25  0817 06/22/25  1414 06/23/25  0226 06/23/25  1159 06/24/25  0615   RBC 2.23*  --  3.29*  --  2.63*   HGB 7.1*   < > 10.2*   < > 8.2*   HCT 20.7*  --  29.5*  --  24.7*   MCV 92.8  --  89.7  --  93.9   MCH 31.8  --  31.0  --  31.2   MCHC 34.3  --  34.6  --  33.2   RDW 18.2  --  17.0  --  17.9   NEPRELIM 16.54*  --   --   --  11.38*   WBC 22.0*  --  20.0*  --  14.1*   .0*  --  132.0*  --  122.0*    < > = values in this interval not displayed.               Lab Results   Component Value Date     CREATSERUM 2.58 06/24/2025     BUN 57 06/24/2025      06/24/2025     K 3.5 06/24/2025      06/24/2025     CO2 21.0 06/24/2025      06/24/2025     CA 8.5 06/24/2025     MG 1.9 06/24/2025             )           Lab Results   Component Value Date     PGLU 148 06/24/2025                 1 acute blood loss anemia  2 rectal bleeding  3 melena  4 colitis     Initial presentation was bright red blood, more melena now.  Ct w/ suggestion of colitis, risk of infx, ischemia etc discussed.        Still having \"dark stool\", she may still have ongoing bleeding or other process  that puts her at risk.  Hg stable, may be passing old blood     Again discussed options, she continues to want only conservative treatment/eval for now and not want any procedure interventions, endoscopy, surgery etc for now.  Risk of ongoing bleeding and morbidity/mortality reviewed     Discussed in detial risk of morbidity/mortality from bleeding, she understood and states she is okay with this risk and does not want procedural or other intervention/eval     Multiple options for how to proceed were discussed in detail with pt, she is agreeable to the following plan ...  --serial hg  --awaitstool gi pcr  --empiric ppi at 2x/day  --on empiric flagyl  --okay for clears as she does not want other intervention     They will decide if want to consider hospice or other goals of care           Pt/daughter demonstrated understanding of risks of morbidity/mortality if diagnoses and/or treatments were delayed balanced against risks of further investigation and/or treatment.      --daughter/patient demonstrated understanding of the results and recommendations and risks, benefits, limitations, and alternatives to the plan. All of their questions and concerns were addressed and were agreeable to the plan as listed        Babatunde Gibbons MD                  Electronically signed by Baabtunde Gibbons MD at 6/24/2025  8:13 AM   Date/Time Temp Pulse Resp BP SpO2 Weight O2 Device O2 Flow Rate (L/min) [facility]    06/24/25 0901 -- 113 -- -- 97 % -- -- --     06/24/25 0837 -- -- -- -- -- 112 lb 6.4 oz (51 kg) -- --     06/24/25 0415 98.3 °F (36.8 °C) 120 20 136/72 94 % -- None (Room air) --     06/23/25 2100 99.3 °F (37.4 °C) 86 16 147/56 96 % -- None (Room air) --     06/23/25 1202 99.4 °F (37.4 °C) -- 18 132/66 94 % -- None (Room air) -- AR    06/23/25 0801 -- -- -- -- -- -- Nasal cannula 2 L/min AM    06/23/25 0543 98.1 °F (36.7 °C) 89 16 161/58 98 % -- Nasal cannula 2 L/min DEZ          CIWA Scores (since admission)       None           Blood Transfusion Record       Product Unit Status Volume Start End            Transfuse RBC       25  [id number]  [id number] Completed 06/23/25 0019 630 mL 06/22/25 2003 06/23/25 0015     [facility]  25  [id number]  [id number] Completed 06/23/25 0019 398.33 mL 06/22/25 1546 06/22/25 1945       25  [id number]  [id number] Completed 06/21/25 1409 356.25 mL 06/21/25 1009 06/21/25 1300     [facility]  25  [id number]  [id number] Completed 06/21/25 1059 322.92 mL 06/21/25 0659 06/21/25 0934

## 2025-06-24 NOTE — PROGRESS NOTES
[facility]  Progress Note    Shi Dhillon Patient Status:  Inpatient    1935 MRN [id number]   Location [facility] 4NW-A Attending Dheeraj Gregory,    Hosp Day # 3 PCP Breonna Cabral MD     Subjective:  Shi Dhillon is a(n) 89 year old female.         Current complaints: denies new issues    No family at bedside ,     Had \"dark stool\" overnight again,but she denies further red bleeding    Pt denies new issues aside from thirst    States abd pain/cramps seem improved    Risk of egd and colonoscopy including prep, sedation, bleeding, perforation, possible morbidity/mortalty discussed again as well as risk, benefit, alternatives, limitations as well as not having the procedures.  All questions answered, pt demonstrated understanding.    She again did not want endoscopic or other procedural interventions, wanted to be conservative     Spoke w/ Dr Gregory multiple times about her care yesterday as well    Current Hospital Medications[1]     Objective:    /72 (BP Location: Right arm)   Pulse 120   Temp 98.3 °F (36.8 °C) (Oral)   Resp 20   Ht 5' 1\" (1.549 m)   Wt 117 lb (53.1 kg)   SpO2 94%   BMI 22.11 kg/m²   General appearance: alert, appears stated age, and cooperative  Lungs: clear to auscultation bilaterally  Heart: reg rate   Abdomen: present bowel sounds but less active than 6, mild distension again, less than 6/23. No bruits.  No pain w/ palpation. No krystian/guarding and no rigidity   Extremities: no edema  Neurologic: Grossly normal, x3        Labs:     Recent Labs   Lab 25  0817 25  1414 25  0226 25  1159 25  0615   RBC 2.23*  --  3.29*  --  2.63*   HGB 7.1*   < > 10.2*   < > 8.2*   HCT 20.7*  --  29.5*  --  24.7*   MCV 92.8  --  89.7  --  93.9   MCH 31.8  --  31.0  --  31.2   MCHC 34.3  --  34.6  --  33.2   RDW 18.2  --  17.0  --  17.9   NEPRELIM 16.54*  --   --   --  11.38*   WBC 22.0*  --  20.0*  --  14.1*   .0*  --  132.0*   --  122.0*    < > = values in this interval not displayed.       Lab Results   Component Value Date    CREATSERUM 2.58 06/24/2025    BUN 57 06/24/2025     06/24/2025    K 3.5 06/24/2025     06/24/2025    CO2 21.0 06/24/2025     06/24/2025    CA 8.5 06/24/2025    MG 1.9 06/24/2025             )    Lab Results   Component Value Date    PGLU 148 06/24/2025           Assessment and Plan:  Problem List[2]    1 acute blood loss anemia  2 rectal bleeding  3 melena  4 colitis    Initial presentation was bright red blood, more melena now.  Ct w/ suggestion of colitis, risk of infx, ischemia etc discussed.       Still having \"dark stool\", she may still have ongoing bleeding or other process that puts her at risk.  Hg stable, may be passing old blood    Again discussed options, she continues to want only conservative treatment/eval for now and not want any procedure interventions, endoscopy, surgery etc for now.  Risk of ongoing bleeding and morbidity/mortality reviewed     Discussed in detial risk of morbidity/mortality from bleeding, she understood and states she is okay with this risk and does not want procedural or other intervention/eval    Multiple options for how to proceed were discussed in detail with pt, she is agreeable to the following plan ...  --serial hg  --awaitstool gi pcr  --empiric ppi at 2x/day  --on empiric flagyl  --okay for clears as she does not want other intervention    They will decide if want to consider hospice or other goals of care        Pt/daughter demonstrated understanding of risks of morbidity/mortality if diagnoses and/or treatments were delayed balanced against risks of further investigation and/or treatment.     --daughter/patient demonstrated understanding of the results and recommendations and risks, benefits, limitations, and alternatives to the plan. All of their questions and concerns were addressed and were agreeable to the plan as listed      Babatunde Gibbons,  MD           [1]   Current Facility-Administered Medications   Medication Dose Route Frequency    potassium chloride (Klor-Con) 20 MEQ oral powder 40 mEq  40 mEq Oral Once    lactated ringers infusion   Intravenous Continuous    metroNIDAZOLE (Flagyl) tab 500 mg  500 mg Oral BID    acetaminophen (Tylenol) tab 650 mg  650 mg Oral Q4H PRN    pantoprazole (Protonix) 40 mg in sodium chloride 0.9% PF 10 mL IV push  40 mg Intravenous Q12H    ondansetron (Zofran) 4 MG/2ML injection 4 mg  4 mg Intravenous Q6H PRN    insulin aspart (NovoLOG) 100 Units/mL FlexPen 1-5 Units  1-5 Units Subcutaneous TID AC and HS   [2]   Patient Active Problem List  Diagnosis    Fall    Gastrointestinal hemorrhage with hematemesis    History of breast cancer    Low back pain    NSAID induced gastritis    Type 2 diabetes mellitus without complication, with long-term current use of insulin (HCC)    Lumbar spondylosis    Spinal stenosis of lumbar region with neurogenic claudication    Diabetic polyneuropathy associated with type 2 diabetes mellitus (HCC)    Gait disturbance    Chronic pain of both shoulders    Effusion of joint of right shoulder    Primary osteoarthritis of right shoulder    Chronic pain syndrome    Hyponatremia    Anemia    Hyperglycemia    Acute on chronic congestive heart failure, unspecified heart failure type (HCC)    Hypoxia    Left foot pain    Nausea and vomiting, unspecified vomiting type    Gastrointestinal hemorrhage, unspecified gastrointestinal hemorrhage type    Anemia, unspecified type

## 2025-06-24 NOTE — PROGRESS NOTES
[facility] Hospitalist Progress Note     Subjective:  No acute events.  Denies chest no dyspnea.  Family at bedside.    Review of Systems  Comprehensive ROS reviewed and negative except for what is stated in HPI.      OBJECTIVE:  /72 (BP Location: Right arm)   Pulse 113   Temp 98.3 °F (36.8 °C) (Oral)   Resp 20   Ht 5' 1\" (1.549 m)   Wt 112 lb 6.4 oz (51 kg)   SpO2 97%   BMI 21.24 kg/m²   General: Alert, cooperative.   HEENT:  EOMI, PERRLA.  Pulm: Normal respiratory effort.  CV: Regular rate and rhythm, no murmur.   Abd: Soft, nontender, nondistended.   MSK: Moves extremities spontaneously .  Skin: No lesions or rashes.  Neuro: Aox4. Moving extremities. + tremors in BUE and head.       Data Review:    LABS:   Lab Results   Component Value Date    WBC 14.1 06/24/2025    HGB 8.2 06/24/2025    HCT 24.7 06/24/2025    .0 06/24/2025    CREATSERUM 2.58 06/24/2025    BUN 57 06/24/2025     06/24/2025    K 3.5 06/24/2025     06/24/2025    CO2 21.0 06/24/2025     06/24/2025    CA 8.5 06/24/2025    MG 1.9 06/24/2025    PGLU 148 06/24/2025       All lab work and imaging personally reviewed.    Assessment/Plan:     Assessment/ Plan:     Patient is a 89 year old female with with history of HTN, HLD, CAD, type II DM with neuropathy, and depression presented for acute rectal bleeding.      #Acute on chronic anemia 2/2 rectal bleeding  -CT with diffuse colitis, C-diff neg, stool path pcr pending  -s/p 2 units prbc, trend Hgb.  Transfuse as needed.  -empiric abx per GI  -Continue PPI, supportive care   -Advance diet as tolerated  -D/W GI Dr. Gibbons and patient's daughter.  She has a high likelihood of clinical deterioration, will continue conservative management for now.  Discussed option of hospice and patient and daughter agreeable if clinical status worsens, however patient now considering rehab.  Social work following.    #Chronic tremors  -Appears to be worsening.  No intervention  warranted at this time per neuro.  Recommend outpatient follow-up if needed.    #CKD  -monitor      #HTN  #HLD  #CAD  #Type II DM  -accuchecks, holding home antihypertensives     DVT ppx: SCDs  Diet: CLD  Disposition: depending clinical course  Code status: DNR/ DNI    D/W RN and GI     Outpatient records or previous hospital records reviewed.   [facility] hospitalist to continue to follow patient while in house.     Dheeraj Gregory DO  [facility] Hospitalist

## 2025-06-24 NOTE — PLAN OF CARE
Patient is alert and oriented, still having bloody BM. Denies pain or SOB. Feeling thirsty, ice chips given, pt NPO. IVF infusing. Patient still states that she doesn't want to be scoped. Hgb stable, next draw at 0600. No acute events overnight. Safety precautions in place, plan of care ongoing.    Problem: METABOLIC/FLUID AND ELECTROLYTES - ADULT  Goal: Glucose maintained within prescribed range  Description: INTERVENTIONS:  - Monitor Blood Glucose as ordered  - Assess for signs and symptoms of hyperglycemia and hypoglycemia  - Administer ordered medications to maintain glucose within target range  - Assess barriers to adequate nutritional intake and initiate nutrition consult as needed  - Instruct patient on self management of diabetes  Outcome: Progressing     Problem: SKIN/TISSUE INTEGRITY - ADULT  Goal: Skin integrity remains intact  Description: INTERVENTIONS  - Assess and document risk factors for pressure ulcer development  - Assess and document skin integrity  - Monitor for areas of redness and/or skin breakdown  - Initiate interventions, skin care algorithm/standards of care as needed  Outcome: Not Progressing     Problem: HEMATOLOGIC - ADULT  Goal: Maintains hematologic stability  Description: INTERVENTIONS  - Assess for signs and symptoms of bleeding or hemorrhage  - Monitor labs and vital signs for trends  - Administer supportive blood products/factors, fluids and medications as ordered and appropriate  - Administer supportive blood products/factors as ordered and appropriate  Outcome: Not Progressing  Goal: Free from bleeding injury  Description: (Example usage: patient with low platelets)  INTERVENTIONS:  - Avoid intramuscular injections, enemas and rectal medication administration  - Ensure safe mobilization of patient  - Hold pressure on venipuncture sites to achieve adequate hemostasis  - Assess for signs and symptoms of internal bleeding  - Monitor lab trends  - Patient is to report abnormal signs  of bleeding to staff  - Avoid use of toothpicks and dental floss  - Use electric shaver for shaving  - Use soft bristle tooth brush  - Limit straining and forceful nose blowing  Outcome: Not Progressing

## 2025-06-24 NOTE — PROGRESS NOTES
Provider Clarification    Please specify the type/acuity of heart failure    Chronic HFpEF/Diastolic heart failure     This note is part of the patient's medical record.

## 2025-06-24 NOTE — PHYSICAL THERAPY NOTE
PHYSICAL THERAPY TREATMENT NOTE - INPATIENT    Room Number: 405/405-A     Session: 1     Number of Visits to Meet Established Goals: 5    Presenting Problem: GI hemorrhage and anemia  Co-Morbidities : HLD, CAD, CHF, chronic pn c polyneuropathy, spondylosis, DM2, R ROSALIA, breast CA    PHYSICAL THERAPY ASSESSMENT   Patient demonstrates fair progress this session, goals  remain in progress.      Patient is requiring moderate assist and maximum assist as a result of the following impairments: decreased functional strength, decreased endurance/aerobic capacity, pain, impaired standing balance, impaired motor planning, decreased muscular endurance, and medical status.     Patient continues to function below her baseline with bed mobility and transfers.  Next session anticipate patient to progress bed mobility and transfers.  Physical Therapy will continue to follow patient for duration of hospitalization.    Patient continues to benefit from continued skilled PT services: to promote return to prior level of function and safety with continuous assistance and gradual rehabilitative therapy .    PLAN DURING HOSPITALIZATION  Nursing Mobility Recommendation : Lift Equipment  PT Device Recommendation: Mechanical lift  PT Treatment Plan: Bed mobility, Body mechanics, Endurance, Energy conservation, Patient education, Family education, Strengthening, Transfer training, Balance training  Frequency (Obs): 3-5x/week     CURRENT GOALS     Goal #1 Patient is able to demonstrate supine - sit EOB @ level: minimum assistance     Goal #2 Patient is able to demonstrate transfers EOB to/from Chair/Wheelchair at assistance level: moderate assistance     Goal #3 Patient will demo ability to tolerate prolonged sitting unsupported x10 min to improve ability to tolerate completion of ADLs  Met      Goal #4    Goal #5    Goal #6    Goal Comments: Goals established on 6/22/2025 6/24/2025 see above for  goals ongoing, goal 3 achieved      SUBJECTIVE  \" I need a break because my head is tired and shaking too much.\"    OBJECTIVE  Precautions: Limb alert - left, Bed/chair alarm, Other (Comment) (mod tremors- head/trunk and BUE)    WEIGHT BEARING RESTRICTION     PAIN ASSESSMENT   Rating: Unable to rate  Location: rangel shoulders  Management Techniques: Repositioning    BALANCE                                                                                                                       Static Sitting: Fair +  Dynamic Sitting: Fair -           Static Standing: Poor  Dynamic Standing: Poor    ACTIVITY TOLERANCE                         O2 WALK       AM-PAC '6-Clicks' INPATIENT SHORT FORM - BASIC MOBILITY  How much difficulty does the patient currently have...  Patient Difficulty: Turning over in bed (including adjusting bedclothes, sheets and blankets)?: A Little   Patient Difficulty: Sitting down on and standing up from a chair with arms (e.g., wheelchair, bedside commode, etc.): A Lot   Patient Difficulty: Moving from lying on back to sitting on the side of the bed?: A Lot   How much help from another person does the patient currently need...   Help from Another: Moving to and from a bed to a chair (including a wheelchair)?: A Lot   Help from Another: Need to walk in hospital room?: Total   Help from Another: Climbing 3-5 steps with a railing?: Total     AM-PAC Score:  Raw Score: 11   Approx Degree of Impairment: 72.57%   Standardized Score (AM-PAC Scale): 33.86   CMS Modifier (G-Code): CL    FUNCTIONAL ABILITY STATUS  Gait Assessment   Functional Mobility/Gait Assessment  Gait Assistance: Not tested  Distance (ft): n/a    Skilled Therapy Provided    Bed Mobility:  Rolling: partial roll with sup, to complete roll to her side,min assist to R and L.  Repeated three times due to need of change of her brief.   Supine<>Sit: from sideline position with mod assist of one. Pt required mod assist to scoot to edge.    Sit<>Supine: NT     Transfer  Mobility:  Sit<>Stand: max assist of one   Stand<>Sit: max assist of one for eccentric control.    Gait: NT    Therapist's Comments: Rn approved session. Patient rolled side to side three times, performed supine to sit with assist but pt used her core strength to assist, min assist for trunk support and to maintain balance initially, mod assist to scoot to edge. During transition of supine to sit, patient fatigued easily, she required seated rest with posture support for head stability then able to complete scooting. Once at EOB, patient maintained sitting with ue support and SBA.   Patient performed sit to stand with one hand support on the arm rest of the chair placed next to the bed and one hand on the bed, max assist, able to transfer hand on the other arm rest and take step to pivot to chair. Pt more stable and able to do more if allowed extra time.   Patient required brief change due to dark stool all over her brief. RN assisted and the writer assisted with rolling and LE positioning.         THERAPEUTIC EXERCISES  Lower Extremity Ankle pumps  Hip AB/AD  Heel slides  LAQ  SLR  All with assist. R weaker than l.    Upper Extremity Elbow flex/ext and Shoulder flex/ext  Limited range   Position Sitting and Supine     Repetitions   10   Sets   1     Patient End of Session: Up in chair, Needs met, Call light within reach, RN aware of session/findings, All patient questions and concerns addressed, Family present    PT Session Time: 38 minutes  Gait Trainin minutes  Therapeutic Activity: 18 minutes  Therapeutic Exercise: 10 minutes   Neuromuscular Re-education: 10 minutes

## 2025-06-24 NOTE — PROGRESS NOTES
Physician Clarification    Please further specify the severity of chronic kidney disease    Chronic Kidney Disease Stage 4 (severe) with eGFR 15-29     This note is part of the patient's medical record.

## 2025-06-24 NOTE — OCCUPATIONAL THERAPY NOTE
Attempted to see patient for Occupational Therapy, however patient occupied w/ patient care.  Will re-attempt as schedule permits.

## 2025-06-25 LAB
ANION GAP SERPL CALC-SCNC: 11 MMOL/L (ref 0–18)
BASOPHILS # BLD AUTO: 0.03 X10(3) UL (ref 0–0.2)
BASOPHILS NFR BLD AUTO: 0.3 %
BUN BLD-MCNC: 49 MG/DL (ref 9–23)
CALCIUM BLD-MCNC: 8.5 MG/DL (ref 8.7–10.6)
CHLORIDE SERPL-SCNC: 109 MMOL/L (ref 98–112)
CO2 SERPL-SCNC: 22 MMOL/L (ref 21–32)
CREAT BLD-MCNC: 2.2 MG/DL (ref 0.55–1.02)
EGFRCR SERPLBLD CKD-EPI 2021: 21 ML/MIN/1.73M2 (ref 60–?)
EOSINOPHIL # BLD AUTO: 0.11 X10(3) UL (ref 0–0.7)
EOSINOPHIL NFR BLD AUTO: 1.2 %
ERYTHROCYTE [DISTWIDTH] IN BLOOD BY AUTOMATED COUNT: 17.1 %
GLUCOSE BLD-MCNC: 144 MG/DL (ref 70–99)
GLUCOSE BLD-MCNC: 150 MG/DL (ref 70–99)
GLUCOSE BLD-MCNC: 228 MG/DL (ref 70–99)
GLUCOSE BLD-MCNC: 236 MG/DL (ref 70–99)
GLUCOSE BLD-MCNC: 379 MG/DL (ref 70–99)
HCT VFR BLD AUTO: 23 % (ref 35–48)
HGB BLD-MCNC: 8 G/DL (ref 12–16)
HGB BLD-MCNC: 8 G/DL (ref 12–16)
HGB BLD-MCNC: 8.4 G/DL (ref 12–16)
HGB BLD-MCNC: 8.8 G/DL (ref 12–16)
IMM GRANULOCYTES # BLD AUTO: 0.2 X10(3) UL (ref 0–1)
IMM GRANULOCYTES NFR BLD: 2.1 %
LYMPHOCYTES # BLD AUTO: 0.8 X10(3) UL (ref 1–4)
LYMPHOCYTES NFR BLD AUTO: 8.4 %
MAGNESIUM SERPL-MCNC: 1.8 MG/DL (ref 1.6–2.6)
MCH RBC QN AUTO: 31.4 PG (ref 26–34)
MCHC RBC AUTO-ENTMCNC: 34.8 G/DL (ref 31–37)
MCV RBC AUTO: 90.2 FL (ref 80–100)
MONOCYTES # BLD AUTO: 1.36 X10(3) UL (ref 0.1–1)
MONOCYTES NFR BLD AUTO: 14.4 %
NEUTROPHILS # BLD AUTO: 6.97 X10 (3) UL (ref 1.5–7.7)
NEUTROPHILS # BLD AUTO: 6.97 X10(3) UL (ref 1.5–7.7)
NEUTROPHILS NFR BLD AUTO: 73.6 %
OSMOLALITY SERPL CALC.SUM OF ELEC: 310 MOSM/KG (ref 275–295)
PLATELET # BLD AUTO: 118 10(3)UL (ref 150–450)
POTASSIUM SERPL-SCNC: 3.7 MMOL/L (ref 3.5–5.1)
POTASSIUM SERPL-SCNC: 3.7 MMOL/L (ref 3.5–5.1)
RBC # BLD AUTO: 2.55 X10(6)UL (ref 3.8–5.3)
SODIUM SERPL-SCNC: 142 MMOL/L (ref 136–145)
WBC # BLD AUTO: 9.5 X10(3) UL (ref 4–11)

## 2025-06-25 RX ORDER — PROCHLORPERAZINE EDISYLATE 5 MG/ML
10 INJECTION INTRAMUSCULAR; INTRAVENOUS EVERY 6 HOURS PRN
Status: DISCONTINUED | OUTPATIENT
Start: 2025-06-25 | End: 2025-06-28

## 2025-06-25 NOTE — CM/SW NOTE
SAMEER called patient's daughter Jaymie to discuss DC planning and [facility] choice. Jaymie reported that she is very confused and that MD's have been talking with her about GOC and hospice for patient. SAMEER informed that there are two options for DC (Rehab vs. Comfort focused care under hospice with return to [facility]).     Jaymie is open to informational meeting with preferred provider for hospice at [facility] and requested to not use [facility]. SAMEER called [facility] and spoke with RN Fernando [phone] who reported that they use [facility].     Hospice consult received from MD. Referral sent to [facility] and requested they call patient's daughter Jaymie to have an informational meeting.     Message also was sent to [facility] to see if bed is available per daughters request.     Update: Call received from Alondra with [facility] ([phone]) who is calling patient's daughters to organize time for informational meeting.     SW will continue to follow for plan of care changes and remain available for any additional DC needs or concerns.     Neelam Mcmillan MSW, LSW  Discharge Planner   [phone]

## 2025-06-25 NOTE — DIETARY NOTE
[facility]   part of [facility]   CLINICAL NUTRITION    Shi Dhillon     Admitting diagnosis:  Gastrointestinal hemorrhage, unspecified gastrointestinal hemorrhage type [K92.2]  Anemia, unspecified type [D64.9]    Ht: 154.9 cm (5' 1\")  Wt: 51 kg (112 lb 6.4 oz).   Body mass index is 21.24 kg/m².  IBW: 47.7kg    Wt Readings from Last 6 Encounters:   06/24/25 51 kg (112 lb 6.4 oz)   05/28/25 53.1 kg (117 lb)   04/30/25 53.1 kg (117 lb)   12/11/24 54.4 kg (120 lb)   09/17/24 54.4 kg (120 lb)   06/27/24 54.4 kg (120 lb)        Labs/Meds reviewed    Diet:       Procedures    Full liquid diet Is Patient on Accuchecks? Yes     Percent Meals Eaten (last 3 days)       Date/Time Percent Meals Eaten (%)    06/22/25 1300 120 %    06/22/25 1700 100 %    06/25/25 0906 50 %          6/25 Per GI note, patient wants conservative treatment/eval of GI bleed for now. Declining scope. Diet advanced to FLD yesterday, low fiber/soft today. Per documentation, taking 50% of meals.  Visited patient in room, daughter at bedside. Offered ordering ONS to  maximize nutrition, pt agreeable.     6/23 89 year old female admitted with acute rectal bleeding, low hemoglobin s/p 2 units prbc. Ct with diffuse colitis, cdiff neg    PMH:  HTN, CAD, DM with neuropathy    Pt chart reviewed d/t MST+. Pt remains NPO  Met with pt at bedside and she denies any wt loss and states eating well prior admit.  She is asking for ice chips deferred to RN   Patient reports good appetite at this time.  Nursing notes reports Percent Meals Eaten (%): 50 % intake for last meal.  Tolerating po diet without diarrhea, emesis, or constipation.   No significant weight changes noted.     Patient is at low nutrition risk at this time.    Please consult if patient status changes or nutrition issues arise.    Minerva Townsend RDN, LDN, [facility]  Clinical Dietitian   [phone]

## 2025-06-25 NOTE — PLAN OF CARE
Patient is A/O x 4. VSS. Afebrile. No complaints of pain. Room air. Full liquid advanced to soft; patient tolerated but has poor appetite.  No stools. Hgb Q6, see results. BG coverage per sliding scale. Incontinent of both bladder and bowel. All medications given per MAR. IVF infusing per orders.  Safety precautions in place.  Call light within reach. Family at bedside.  Was informed by PCT that patient refused labs.    Problem: METABOLIC/FLUID AND ELECTROLYTES - ADULT  Goal: Glucose maintained within prescribed range  Description: INTERVENTIONS:  - Monitor Blood Glucose as ordered  - Assess for signs and symptoms of hyperglycemia and hypoglycemia  - Administer ordered medications to maintain glucose within target range  - Assess barriers to adequate nutritional intake and initiate nutrition consult as needed  - Instruct patient on self management of diabetes  Outcome: Progressing     Problem: SKIN/TISSUE INTEGRITY - ADULT  Goal: Skin integrity remains intact  Description: INTERVENTIONS  - Assess and document risk factors for pressure ulcer development  - Assess and document skin integrity  - Monitor for areas of redness and/or skin breakdown  - Initiate interventions, skin care algorithm/standards of care as needed  Outcome: Progressing     Problem: HEMATOLOGIC - ADULT  Goal: Maintains hematologic stability  Description: INTERVENTIONS  - Assess for signs and symptoms of bleeding or hemorrhage  - Monitor labs and vital signs for trends  - Administer supportive blood products/factors, fluids and medications as ordered and appropriate  - Administer supportive blood products/factors as ordered and appropriate  Outcome: Progressing  Goal: Free from bleeding injury  Description: (Example usage: patient with low platelets)  INTERVENTIONS:  - Avoid intramuscular injections, enemas and rectal medication administration  - Ensure safe mobilization of patient  - Hold pressure on venipuncture sites to achieve adequate  hemostasis  - Assess for signs and symptoms of internal bleeding  - Monitor lab trends  - Patient is to report abnormal signs of bleeding to staff  - Avoid use of toothpicks and dental floss  - Use electric shaver for shaving  - Use soft bristle tooth brush  - Limit straining and forceful nose blowing  Outcome: Progressing

## 2025-06-25 NOTE — PROGRESS NOTES
[facility]  Progress Note    Shi Dhillon Patient Status:  Inpatient    1935 MRN [id number]   Location [facility] 4NW-A Attending Dheeraj Gregory, DO   Hosp Day # 4 PCP Breonna Cabral MD     Subjective:  Shi Dhillon is a(n) 89 year old female.         Current complaints: denies new issues    No family at bedside , I called and spoke w/ daughter (Jaymie) today as well and again reviewed risk of endoscopy, recurrent bleeding, anemia etc and she again confirms they want to avoid procedures etc for now     Had \"more of a brown stool\" overnight again,but she denies further red bleeding and stools no longer maroon    Pt denies new issues      States abd pain/cramps resolved      Spoke w/ Dr Gregory today as well    Current Hospital Medications[1]     Objective:    /66 (BP Location: Right arm)   Pulse 50   Temp 98.1 °F (36.7 °C) (Oral)   Resp 18   Ht 5' 1\" (1.549 m)   Wt 112 lb 6.4 oz (51 kg)   SpO2 100%   BMI 21.24 kg/m²   General appearance: alert, appears stated age, and cooperative  Lungs: clear to auscultation bilaterally  Heart: reg rate   Abdomen: present bowel sounds,mild distension. No bruits.  No pain w/ palpation. No krystian/guarding and no rigidity   Extremities: no edema  Neurologic: Grossly normal, x3        Labs:     Recent Labs   Lab 25  0226 25  1159 25  0615 25  1234 25  0534   RBC 3.29*  --  2.63*  --  2.55*   HGB 10.2*   < > 8.2*   < > 8.0*  8.0*   HCT 29.5*  --  24.7*  --  23.0*   MCV 89.7  --  93.9  --  90.2   MCH 31.0  --  31.2  --  31.4   MCHC 34.6  --  33.2  --  34.8   RDW 17.0  --  17.9  --  17.1   NEPRELIM  --   --  11.38*  --  6.97   WBC 20.0*  --  14.1*  --  9.5   .0*  --  122.0*  --  118.0*    < > = values in this interval not displayed.       Lab Results   Component Value Date    CREATSERUM 2.20 2025    BUN 49 2025     2025    K 3.7 2025    K 3.7 2025     2025     CO2 22.0 06/25/2025     06/25/2025    CA 8.5 06/25/2025    MG 1.8 06/25/2025             )    Lab Results   Component Value Date    PGLU 150 06/25/2025           Assessment and Plan:  Problem List[2]    1 acute blood loss anemia  2 rectal bleeding  3 melena  4 colitis    Initial presentation was bright red blood, more melena now.  Ct w/ suggestion of colitis, risk of infx, ischemia etc discussed.       Still having \"dark stool\", she may still have ongoing bleeding or other process that puts her at risk.  Hg stable, may be passing old blood    Again discussed options today, they continues to want only conservative treatment/eval for now and not want any procedure interventions, endoscopy, surgery etc for now.  Risk of ongoing bleeding and morbidity/mortality reviewed      --clinically appears to be improving with more brown stool and more form to stool  --empiric ppi at 2x/day  --on empiric flagyl, wbc improved, okay to stop around 6/27  --okay for adv diet as tolerated as she does not want other intervention    They will decide if want to consider hospice or other goals of care, reviewed again today        Will sign off for now, please call w/ questions    Pt/daughter demonstrated understanding of risks of morbidity/mortality if diagnoses and/or treatments were delayed balanced against risks of further investigation and/or treatment.     --daughter/patient demonstrated understanding of the results and recommendations and risks, benefits, limitations, and alternatives to the plan. All of their questions and concerns were addressed and were agreeable to the plan as listed      Babatunde Gibbons MD           [1]   Current Facility-Administered Medications   Medication Dose Route Frequency    lactated ringers infusion   Intravenous Continuous    metroNIDAZOLE (Flagyl) tab 500 mg  500 mg Oral BID    acetaminophen (Tylenol) tab 650 mg  650 mg Oral Q4H PRN    pantoprazole (Protonix) 40 mg in sodium chloride 0.9% PF  10 mL IV push  40 mg Intravenous Q12H    ondansetron (Zofran) 4 MG/2ML injection 4 mg  4 mg Intravenous Q6H PRN    insulin aspart (NovoLOG) 100 Units/mL FlexPen 1-5 Units  1-5 Units Subcutaneous TID AC and HS   [2]   Patient Active Problem List  Diagnosis    Fall    Gastrointestinal hemorrhage with hematemesis    History of breast cancer    Low back pain    NSAID induced gastritis    Type 2 diabetes mellitus without complication, with long-term current use of insulin (HCC)    Lumbar spondylosis    Spinal stenosis of lumbar region with neurogenic claudication    Diabetic polyneuropathy associated with type 2 diabetes mellitus (HCC)    Gait disturbance    Chronic pain of both shoulders    Effusion of joint of right shoulder    Primary osteoarthritis of right shoulder    Chronic pain syndrome    Hyponatremia    Anemia    Hyperglycemia    Acute on chronic congestive heart failure, unspecified heart failure type ([facility])    Hypoxia    Left foot pain    Nausea and vomiting, unspecified vomiting type    Gastrointestinal hemorrhage, unspecified gastrointestinal hemorrhage type    Anemia, unspecified type    Benign essential tremor

## 2025-06-25 NOTE — PLAN OF CARE
Problem: SKIN/TISSUE INTEGRITY - ADULT  Goal: Skin integrity remains intact  Description: INTERVENTIONS  - Assess and document risk factors for pressure ulcer development  - Assess and document skin integrity  - Monitor for areas of redness and/or skin breakdown  - Initiate interventions, skin care algorithm/standards of care as needed  Outcome: Not Progressing     Problem: HEMATOLOGIC - ADULT  Goal: Maintains hematologic stability  Description: INTERVENTIONS  - Assess for signs and symptoms of bleeding or hemorrhage  - Monitor labs and vital signs for trends  - Administer supportive blood products/factors, fluids and medications as ordered and appropriate  - Administer supportive blood products/factors as ordered and appropriate  Outcome: Not Progressing  Goal: Free from bleeding injury  Description: (Example usage: patient with low platelets)  INTERVENTIONS:  - Avoid intramuscular injections, enemas and rectal medication administration  - Ensure safe mobilization of patient  - Hold pressure on venipuncture sites to achieve adequate hemostasis  - Assess for signs and symptoms of internal bleeding  - Monitor lab trends  - Patient is to report abnormal signs of bleeding to staff  - Avoid use of toothpicks and dental floss  - Use electric shaver for shaving  - Use soft bristle tooth brush  - Limit straining and forceful nose blowing  Outcome: Not Progressing

## 2025-06-26 LAB
ANION GAP SERPL CALC-SCNC: 13 MMOL/L (ref 0–18)
ANTIBODY SCREEN: NEGATIVE
BASOPHILS # BLD: 0 X10(3) UL (ref 0–0.2)
BASOPHILS NFR BLD: 0 %
BUN BLD-MCNC: 38 MG/DL (ref 9–23)
CALCIUM BLD-MCNC: 7.7 MG/DL (ref 8.7–10.6)
CHLORIDE SERPL-SCNC: 106 MMOL/L (ref 98–112)
CO2 SERPL-SCNC: 19 MMOL/L (ref 21–32)
CREAT BLD-MCNC: 1.94 MG/DL (ref 0.55–1.02)
EGFRCR SERPLBLD CKD-EPI 2021: 24 ML/MIN/1.73M2 (ref 60–?)
EOSINOPHIL # BLD: 0 X10(3) UL (ref 0–0.7)
EOSINOPHIL NFR BLD: 0 %
ERYTHROCYTE [DISTWIDTH] IN BLOOD BY AUTOMATED COUNT: 16.9 %
GLUCOSE BLD-MCNC: 188 MG/DL (ref 70–99)
GLUCOSE BLD-MCNC: 208 MG/DL (ref 70–99)
GLUCOSE BLD-MCNC: 222 MG/DL (ref 70–99)
GLUCOSE BLD-MCNC: 260 MG/DL (ref 70–99)
GLUCOSE BLD-MCNC: 275 MG/DL (ref 70–99)
HCT VFR BLD AUTO: 20.8 % (ref 35–48)
HGB BLD-MCNC: 6.6 G/DL (ref 12–16)
HGB BLD-MCNC: 9.3 G/DL (ref 12–16)
HGB BLD-MCNC: 9.6 G/DL (ref 12–16)
LYMPHOCYTES NFR BLD: 0.68 X10(3) UL (ref 1–4)
LYMPHOCYTES NFR BLD: 6 %
MAGNESIUM SERPL-MCNC: 1.4 MG/DL (ref 1.6–2.6)
MCH RBC QN AUTO: 31 PG (ref 26–34)
MCHC RBC AUTO-ENTMCNC: 31.7 G/DL (ref 31–37)
MCV RBC AUTO: 97.7 FL (ref 80–100)
MONOCYTES # BLD: 0.23 X10(3) UL (ref 0.1–1)
MONOCYTES NFR BLD: 2 %
MORPHOLOGY: NORMAL
NEUTROPHILS # BLD AUTO: 8.8 X10 (3) UL (ref 1.5–7.7)
NEUTROPHILS NFR BLD: 89 %
NEUTS BAND NFR BLD: 3 %
NEUTS HYPERSEG # BLD: 10.4 X10(3) UL (ref 1.5–7.7)
OSMOLALITY SERPL CALC.SUM OF ELEC: 301 MOSM/KG (ref 275–295)
PLATELET # BLD AUTO: 78 10(3)UL (ref 150–450)
PLATELET MORPHOLOGY: NORMAL
PLATELETS.RETICULATED NFR BLD AUTO: 2 % (ref 0–7)
POTASSIUM SERPL-SCNC: 4 MMOL/L (ref 3.5–5.1)
RBC # BLD AUTO: 2.13 X10(6)UL (ref 3.8–5.3)
RH BLOOD TYPE: POSITIVE
SODIUM SERPL-SCNC: 138 MMOL/L (ref 136–145)
TOTAL CELLS COUNTED BLD: 100
WBC # BLD AUTO: 11.3 X10(3) UL (ref 4–11)

## 2025-06-26 RX ORDER — MAGNESIUM OXIDE 400 MG/1
800 TABLET ORAL ONCE
Status: COMPLETED | OUTPATIENT
Start: 2025-06-26 | End: 2025-06-26

## 2025-06-26 NOTE — PLAN OF CARE
Pt having multiple emesis tonight and desating to low 70s% in 2L of NC. Daughter POA is at the bedside. Non-rebreather mask applied and  sats improved to 100%. Pt is a little bit drowsy but able to answer questions. Pt is DNR/[facility]. Daughter POA don't want more further intervention aside from oxygen. Doctor Salvador updated of the above. MD spoke to the daughter over the phone. Daughter POA just want pt to be comfortable. Compazine added for daviduea.    Addendum. No nausea or vomiting noted since last night. Slept good. Oxygen sating 100% in 3L. Denies any pain. Hgb this morning is 6.6. MD notified and order for 1 PRBC. Called daughter POA and agreed to give the pt another unit of blood.

## 2025-06-26 NOTE — OCCUPATIONAL THERAPY NOTE
Attempted to see pt for OT eval. Pt with critical Hgb value of 6.6, receiving blood transfusion this morning. Also with ongoing goals of care discussions. Will hold and re-attempt pending medical stability and as schedule allows. RN aware and in agreement.

## 2025-06-26 NOTE — PHYSICAL THERAPY NOTE
Attempted to see pt for f/u therapy session today. Pt with critical Hgb value of 6.6, receiving blood transfusion this morning. Also with ongoing goals of care discussions. Will hold and re-attempt pending medical stability and as schedule allows. RN aware and in agreement.

## 2025-06-26 NOTE — SPIRITUAL CARE NOTE
Spiritual Care Visit Note    Patient Name: Shi Dhillon Date of Spiritual Care Visit: 25   : 1935 Primary Dx: Gastrointestinal hemorrhage, unspecified gastrointestinal hemorrhage type       Referred By: Referral From: Family    Spiritual Care Taxonomy:    Intended Effects: Journeying with someone in the grief process    Methods: Encourage end of life review, Offer spiritual/Muslim support    Interventions: [place], Invite someone to reminisce, Provide Grief Processing Session, Respond as  to a defined crisis event    Visit Type/Summary:     - Spiritual Care: Responded to a request via the on call phone Consulted with RN prior to visit. Offered empathic listening and emotional support. Shi's two daughters were at bedside providing her comfort. They are sad but seem accepting. They appreciated 's prayers, and also requested prayers for safe travel for family traveling here from different states to be with Shi.   remains available as needed for follow up.    Spiritual Care support can be requested via an Epic consult. For urgent/immediate needs, please contact the On Call  at: Edjoseph: ext [phone]

## 2025-06-26 NOTE — HOSPICE RN NOTE
[facility] consult order received. Hospice will contact family to schedule hospice meeting.    Gay Rojo RN, [facility]  [facility] Liaison  [phone] after hours     Addendum:  Waiting to hear from MSW to see if family wants to meet with [facility].      PCP: Dr Kathy Kendall    CHIEF COMPLAINT: colon CA    HISTORY OF THE PRESENT ILLNESS: this is a 64 yo female with pmh HTN: not on any meds, she was on Norvasc prior which was titrated down and then discontinued after pt lost 40 pounds, OA of her right hip, HLD, anxiety, lumbar DDD. GERD, impaired glucose intolerance, MVP, who noted change in bowel habits with increasing constipation and LLQ discomfort. She underwent a colonoscopy and found to have a large tumor in her sigmoid colon near obstructing and during procedure was unable to pass the scope any further.  Per pt the bx was + for Cancer, but no noted bx results on chart review.  She is now S/P robotic assisted left colectomy and Sigmoidectomy. Hospitalist service consulted for medical comanagement.     7/22: pt seen and examined this am. c/o soreness at surgical site. No sob/chest pain. Ambulating without difficulty. tolerating liquids.     ROS: all 10 systems reviewed and is as above otherwise negative.      Vital Signs Last 24 Hrs  T(C): 36.7 (22 Jul 2021 10:07), Max: 37.1 (21 Jul 2021 15:32)  T(F): 98.1 (22 Jul 2021 10:07), Max: 98.8 (21 Jul 2021 15:32)  HR: 63 (22 Jul 2021 10:07) (49 - 66)  BP: 137/67 (22 Jul 2021 10:07) (87/39 - 137/67)  RR: 16 (22 Jul 2021 10:07) (12 - 18)  SpO2: 98% (22 Jul 2021 10:07) (98% - 100%)    PHYSICAL EXAM:    GENERAL: Comfortable, no acute distress   HEAD:  Normocephalic, atraumatic  EYES: EOMI, PERRLA  HEENT: Moist mucous membranes  NECK: Supple, No JVD  NERVOUS SYSTEM:  Alert & Oriented X3, Motor Strength 5/5 B/L upper and lower extremities  CHEST/LUNG: Clear to auscultation bilaterally  HEART: Regular rate and rhythm  ABDOMEN: Soft, appropriate post op tenderness Nondistended, Bowel sounds +, + pervena, lap sites c/d/i with dermabond  GENITOURINARY: rangel  EXTREMITIES:   No clubbing, cyanosis, or edema  MUSCULOSKELETAL- No muscle tenderness, no joint tenderness  SKIN-no rash    LABS:                        10.1   5.41  )-----------( 169      ( 22 Jul 2021 08:12 )             31.3     07-22    141  |  107  |  12  ----------------------------<  90  3.2<L>   |  32<H>  |  0.62    Ca    7.9<L>      22 Jul 2021 08:12  Phos  3.7     07-22  Mg     2.4     07-22      ASSESSMENT AND PLAN:  64 yo female with above pmh a/w:    #Sigmoid colon Ca  # S/P robotic assisted left colectomy and Sigmoidectomy  -pod#1  -on full liquids  -Pain control  -ambulate  -incentive spirometry  -TOV today.     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 GERD, impaired glucose intolerance, MVP, who noted change in bowel habits with increasing constipation and LLQ discomfort. She underwent a colonoscopy and found to have a large tumor in her sigmoid colon near obstructing and during procedure was unable to pass the scope any further.  Per pt the bx was + for Cancer, but no noted bx results on chart review.  She is now S/P robotic assisted left colectomy and Sigmoidectomy. Hospitalist service consulted for medical comanagement.     7/23: pt seen and examined this am. Doing well. pain controlled. BM+. ambulating.     ROS: all 10 systems reviewed and is as above otherwise negative.        Vital Signs Last 24 Hrs  T(C): 36.9 (23 Jul 2021 10:14), Max: 37.5 (22 Jul 2021 21:13)  T(F): 98.4 (23 Jul 2021 10:14), Max: 99.5 (22 Jul 2021 21:13)  HR: 96 (23 Jul 2021 10:14) (68 - 96)  BP: 137/62 (23 Jul 2021 10:14) (111/50 - 149/78)  RR: 18 (23 Jul 2021 10:14) (16 - 18)  SpO2: 96% (23 Jul 2021 10:14) (96% - 99%)    PHYSICAL EXAM:    GENERAL: Comfortable, no acute distress   HEAD:  Normocephalic, atraumatic  EYES: EOMI, PERRLA  HEENT: Moist mucous membranes  NECK: Supple, No JVD  NERVOUS SYSTEM:  Alert & Oriented X3, Motor Strength 5/5 B/L upper and lower extremities  CHEST/LUNG: Clear to auscultation bilaterally  HEART: Regular rate and rhythm  ABDOMEN: Soft, appropriate post op tenderness Nondistended, Bowel sounds +, +staples intact,   GENITOURINARY: no palpable bladder  EXTREMITIES:   No clubbing, cyanosis, or edema  MUSCULOSKELETAL- No muscle tenderness, no joint tenderness  SKIN-no rash  LABS:                        10.0   5.14  )-----------( 177      ( 23 Jul 2021 08:08 )             31.7     07-23    142  |  110<H>  |  7   ----------------------------<  88  3.6   |  28  |  0.42<L>    Ca    8.2<L>      23 Jul 2021 08:08  Phos  2.6     07-23  Mg     2.1     07-23      MEDICATIONS  (STANDING):  alvimopan 12 milliGRAM(s) Oral two times a day  enoxaparin Injectable 40 milliGRAM(s) SubCutaneous daily    MEDICATIONS  (PRN):  ALPRAZolam 0.25 milliGRAM(s) Oral three times a day PRN for anxiety  morphine  - Injectable 2 milliGRAM(s) IV Push every 4 hours PRN Severe Pain (7 - 10)  ondansetron Injectable 4 milliGRAM(s) IV Push every 8 hours PRN Nausea and/or Vomiting  oxycodone    5 mG/acetaminophen 325 mG 1 Tablet(s) Oral every 6 hours PRN Mild Pain (1 - 3)  oxycodone    5 mG/acetaminophen 325 mG 2 Tablet(s) Oral every 6 hours PRN Moderate Pain (4 - 6)    ASSESSMENT AND PLAN:    #Sigmoid colon Ca  # S/P robotic assisted left colectomy and Sigmoidectomy  -pod#2  -on full liquids, Diet advanced to low fiber  -Pain control  -ambulate  -incentive spirometry    #Hypokalemia:  -repleted    #VTE prophylaxis  lovenox  Venodynes  Amb         Patient seen and examined at bedside. Patient has no complaints and reports pain is under control. Patient is tolerating full liquid diet without nausea or vomiting and is passing flatus. Nurse denies any acute events. Vitals reviewed and WNL        Vitals:  T(C): 36.9 ( @ 10:14), Max: 37.5 ( @ 21:13)  HR: 96 ( @ 10:14) (68 - 96)  BP: 137/62 ( @ 10:14) (111/50 - 149/78)  RR: 18 ( @ 10:14) (16 - 18)  SpO2: 96% ( @ 10:14) (96% - 99%)     @ 07:01  -   @ 07:00  --------------------------------------------------------  IN:  Total IN: 0 mL    OUT:    Voided (mL): 1100 mL  Total OUT: 1100 mL    Total NET: -1100 mL      PHYSICAL EXAM   Gen: well-appearing, in no acute distress  CV: pulse regularly present   Resp: airway patent, non-labored breathing  Abd: soft, NTND; no rebound or guarding. Incision c/d/i       @ 08:08                    10.0  CBC: 5.14>)-------(<177                     31.7                 142 | 110 | 7    CMP:  ----------------------< 88               3.6 | 28 | 0.42                      Ca:8.2  Phos:2.6  M.1               -|      |-        LFTs:  ------|-|-----             -|      |-   @ 08:12                    10.1  CBC: 5.41>)-------(<169                     31.3                 141 | 107 | 12    CMP:  ----------------------< 90               3.2 | 32 | 0.62                      Ca:7.9  Phos:3.7  M.4               -|      |-        LFTs:  ------|-|-----             -|      |-      Current Inpatient Medications:  ALPRAZolam 0.25 milliGRAM(s) Oral three times a day PRN  alvimopan 12 milliGRAM(s) Oral two times a day  enoxaparin Injectable 40 milliGRAM(s) SubCutaneous daily  morphine  - Injectable 2 milliGRAM(s) IV Push every 4 hours PRN  ondansetron Injectable 4 milliGRAM(s) IV Push every 8 hours PRN  oxycodone    5 mG/acetaminophen 325 mG 1 Tablet(s) Oral every 6 hours PRN  oxycodone    5 mG/acetaminophen 325 mG 2 Tablet(s) Oral every 6 hours PRN       SURGERY DAILY PROGRESS NOTE:     Subjective:  Patient seen and examined at bedside during am rounds. AVSS. Denies any fevers, chills, n/v/d, chest pain or shortness of breath    Objective:    MEDICATIONS  (STANDING):  alvimopan 12 milliGRAM(s) Oral two times a day  enoxaparin Injectable 40 milliGRAM(s) SubCutaneous daily  lactated ringers. 1000 milliLiter(s) (100 mL/Hr) IV Continuous <Continuous>    MEDICATIONS  (PRN):  ALPRAZolam 0.25 milliGRAM(s) Oral three times a day PRN for anxiety  morphine  - Injectable 2 milliGRAM(s) IV Push every 4 hours PRN Severe Pain (7 - 10)  ondansetron Injectable 4 milliGRAM(s) IV Push every 8 hours PRN Nausea and/or Vomiting  oxycodone    5 mG/acetaminophen 325 mG 1 Tablet(s) Oral every 6 hours PRN Mild Pain (1 - 3)  oxycodone    5 mG/acetaminophen 325 mG 2 Tablet(s) Oral every 6 hours PRN Moderate Pain (4 - 6)      Vital Signs Last 24 Hrs  T(C): 36.9 (22 Jul 2021 04:33), Max: 37.1 (21 Jul 2021 15:32)  T(F): 98.5 (22 Jul 2021 04:33), Max: 98.8 (21 Jul 2021 15:32)  HR: 61 (22 Jul 2021 04:33) (49 - 78)  BP: 109/56 (22 Jul 2021 04:33) (87/39 - 137/66)  BP(mean): --  RR: 18 (22 Jul 2021 04:33) (10 - 18)  SpO2: 99% (22 Jul 2021 04:33) (98% - 100%)      PHYSICAL EXAM   Gen: well-appearing, in no acute distress  CV: pulse regularly present   Resp: airway patent, non-labored breathing  Abd: soft, NTND; no rebound or guarding. Incision c/d/i      I&O's Detail    21 Jul 2021 07:01  -  22 Jul 2021 07:00  --------------------------------------------------------  IN:    Lactated Ringers: 1000 mL    Other (mL): 1400 mL  Total IN: 2400 mL    OUT:    Indwelling Catheter - Urethral (mL): 475 mL    Other (mL): 105 mL    Voided (mL): 200 mL  Total OUT: 780 mL    Total NET: 1620 mL          Daily     LABS: Pending                  ASSESSMENT/PLAN:

## 2025-06-26 NOTE — PLAN OF CARE
Pt A/O x4. Drowsy at times during day, MD aware. VSS. Afebrile. Pt c/o pain to R arm w/ some swelling, MD notified. Suspected possible IV infiltration-improved w/ ice and elevation. Medications admin per MAR. Pt's hgb 6.6 this AM. 1 unit PRBC's ordered and transfused per order. Pt tolerated well-hgb recheck 9.6. Pt repositioned in bed frequently, pillow support provided. Pt's family at bedside, inquiring about hospice consult. [facility] aware. Pt and family in agreement w/ POC. Fall and safety precautions in place. Call light within reach.

## 2025-06-26 NOTE — PROGRESS NOTES
[facility] Hospitalist Progress Note     Subjective:  No acute events.  Resting in bed.  Family at bedside.    Review of Systems  Comprehensive ROS reviewed and negative except for what is stated in HPI.      OBJECTIVE:  /72 (BP Location: Right arm)   Pulse (!) 145   Temp 98.6 °F (37 °C) (Oral)   Resp 16   Ht 5' 1\" (1.549 m)   Wt 112 lb 6.4 oz (51 kg)   SpO2 98%   BMI 21.24 kg/m²   General: Alert, cooperative.   HEENT:  EOMI, PERRLA.  Pulm: Normal respiratory effort.  CV: Regular rate and rhythm, no murmur.   Abd: Soft, nontender, nondistended.   MSK: Moves extremities spontaneously .  Skin: No lesions or rashes.  Neuro: Aox4. Moving extremities. + tremors.       Data Review:    LABS:   Lab Results   Component Value Date    WBC 9.5 06/25/2025    HGB 8.8 06/25/2025    HCT 23.0 06/25/2025    .0 06/25/2025    CREATSERUM 2.20 06/25/2025    BUN 49 06/25/2025     06/25/2025    K 3.7 06/25/2025    K 3.7 06/25/2025     06/25/2025    CO2 22.0 06/25/2025     06/25/2025    CA 8.5 06/25/2025    MG 1.8 06/25/2025    PGLU 236 06/25/2025       All lab work and imaging personally reviewed.    Assessment/Plan:     Assessment/ Plan:     Patient is a 89 year old female with with history of HTN, HLD, CAD, type II DM with neuropathy, and depression presented for acute rectal bleeding.      #Acute on chronic anemia 2/2 rectal bleeding  #Colitis  -CT with diffuse colitis, C-diff neg, stool path pcr pending  -s/p 2 units prbc, trend Hgb.  Transfuse as needed.  -empiric abx per GI  -Continue PPI, supportive care   -Advance diet as tolerated  -D/W GI Dr. Gibbons and patient's daughter.  She has a high likelihood of clinical deterioration, will continue conservative management for now.  Discussed option of hospice and patient and daughter agreeable if clinical status worsens. Daughter requesting hospice informational meeting. Consult placed    #Chronic tremors  -Appears to be worsening.  No  intervention warranted at this time per neuro.  Recommend outpatient follow-up if needed.    #CKD  -monitor      #HTN  #HLD  #CAD  #Type II DM  -accuchecks, holding home antihypertensives     DVT ppx: SCDs  Diet:GI soft  Disposition: depending clinical course  Code status: DNR/ DNI    D/W RN and GI     Outpatient records or previous hospital records reviewed.   [facility] hospitalist to continue to follow patient while in house.     Dheeraj Gregory DO  [facility] Hospitalist

## 2025-06-27 LAB
ANION GAP SERPL CALC-SCNC: 9 MMOL/L (ref 0–18)
BASOPHILS # BLD AUTO: 0.03 X10(3) UL (ref 0–0.2)
BASOPHILS NFR BLD AUTO: 0.2 %
BLOOD TYPE BARCODE: 6200
BUN BLD-MCNC: 37 MG/DL (ref 9–23)
CALCIUM BLD-MCNC: 8.2 MG/DL (ref 8.7–10.6)
CHLORIDE SERPL-SCNC: 105 MMOL/L (ref 98–112)
CO2 SERPL-SCNC: 21 MMOL/L (ref 21–32)
CREAT BLD-MCNC: 2.49 MG/DL (ref 0.55–1.02)
EGFRCR SERPLBLD CKD-EPI 2021: 18 ML/MIN/1.73M2 (ref 60–?)
EOSINOPHIL # BLD AUTO: 0.14 X10(3) UL (ref 0–0.7)
EOSINOPHIL NFR BLD AUTO: 1 %
ERYTHROCYTE [DISTWIDTH] IN BLOOD BY AUTOMATED COUNT: 16.4 %
GLUCOSE BLD-MCNC: 145 MG/DL (ref 70–99)
GLUCOSE BLD-MCNC: 146 MG/DL (ref 70–99)
GLUCOSE BLD-MCNC: 163 MG/DL (ref 70–99)
GLUCOSE BLD-MCNC: 306 MG/DL (ref 70–99)
GLUCOSE BLD-MCNC: 338 MG/DL (ref 70–99)
GLUCOSE BLD-MCNC: 368 MG/DL (ref 70–99)
GLUCOSE BLD-MCNC: 394 MG/DL (ref 70–99)
HCT VFR BLD AUTO: 27 % (ref 35–48)
HGB BLD-MCNC: 8.7 G/DL (ref 12–16)
HGB BLD-MCNC: 9.3 G/DL (ref 12–16)
IMM GRANULOCYTES # BLD AUTO: 0.19 X10(3) UL (ref 0–1)
IMM GRANULOCYTES NFR BLD: 1.4 %
LYMPHOCYTES # BLD AUTO: 1.43 X10(3) UL (ref 1–4)
LYMPHOCYTES NFR BLD AUTO: 10.7 %
MAGNESIUM SERPL-MCNC: 1.8 MG/DL (ref 1.6–2.6)
MCH RBC QN AUTO: 31.1 PG (ref 26–34)
MCHC RBC AUTO-ENTMCNC: 34.4 G/DL (ref 31–37)
MCV RBC AUTO: 90.3 FL (ref 80–100)
MONOCYTES # BLD AUTO: 1.44 X10(3) UL (ref 0.1–1)
MONOCYTES NFR BLD AUTO: 10.8 %
NEUTROPHILS # BLD AUTO: 10.11 X10 (3) UL (ref 1.5–7.7)
NEUTROPHILS # BLD AUTO: 10.11 X10(3) UL (ref 1.5–7.7)
NEUTROPHILS NFR BLD AUTO: 75.9 %
OSMOLALITY SERPL CALC.SUM OF ELEC: 291 MOSM/KG (ref 275–295)
PLATELET # BLD AUTO: 99 10(3)UL (ref 150–450)
PLATELETS.RETICULATED NFR BLD AUTO: 4.3 % (ref 0–7)
POTASSIUM SERPL-SCNC: 4.2 MMOL/L (ref 3.5–5.1)
RBC # BLD AUTO: 2.99 X10(6)UL (ref 3.8–5.3)
SODIUM SERPL-SCNC: 135 MMOL/L (ref 136–145)
UNIT VOLUME: 284 ML
WBC # BLD AUTO: 13.3 X10(3) UL (ref 4–11)

## 2025-06-27 RX ORDER — LIDOCAINE 50 MG/G
OINTMENT TOPICAL AS NEEDED
Status: DISCONTINUED | OUTPATIENT
Start: 2025-06-27 | End: 2025-06-28

## 2025-06-27 RX ORDER — PANTOPRAZOLE SODIUM 40 MG/1
40 TABLET, DELAYED RELEASE ORAL EVERY 12 HOURS
Status: DISCONTINUED | OUTPATIENT
Start: 2025-06-27 | End: 2025-06-28

## 2025-06-27 NOTE — PHYSICAL THERAPY NOTE
Pt with plans to return to [place] on hospice care- Will dc acute PT as PT is not consistent with end of life care. Please reconsult if POC changes and pt has potential to functionally improve.

## 2025-06-27 NOTE — PLAN OF CARE
Pt A/O x4. VSS. Afebrile. Drowsy at times during day, MD aware. Pt c/o pain in RUE w/ swelling. MD notified. PRN tylenol and lidocaine cream admin per MAR w/ some relief. ACE bandage and ice provided. Medications admin per MAR. Pt w/o IV access-MD aware. Pt and family refusing lab draws-MD aware. Pt's hgb this AM stable at 9.3. Pt did have 1 episode of brown/black tarry stool per PCT, MD notified. Comfort care. POC discussed w/ MD/SW-plan for hospice discharge to [facility] tomorrow. Fall and safety precautions in place. Call light within reach.

## 2025-06-27 NOTE — CM/SW NOTE
SW met with the patient's daughter Sugar to discuss hospice at the [facility] level of care and routine.  They are hoping the patient qualifies for [facility] as they don't want to move her back to [place] unless necessary.  SAMEER spoke with Ajit WHYTE.  Not likely qualifying to [facility] at the current time.  SW did review GIP criteria with family and that it is reviewed daily and a back up plan is always needed for discharge.  They are still deciding between hospice companies at this time.    Serenity Woo LCSW  [facility]  [phone]

## 2025-06-27 NOTE — PROGRESS NOTES
[facility] Hospitalist Progress Note     Subjective:  Resting in bed. Family at bedside.     Review of Systems  Comprehensive ROS reviewed and negative except for what is stated in HPI.      OBJECTIVE:  BP 90/37 (BP Location: Right leg)   Pulse 81   Temp 98 °F (36.7 °C) (Oral)   Resp 20   Ht 5' 1\" (1.549 m)   Wt 112 lb 6.4 oz (51 kg)   SpO2 98%   BMI 21.24 kg/m²   General: Resting in bed. Cooperative.   HEENT:  EOMI, PERRLA.  Pulm: Normal respiratory effort.  CV: Regular rate and rhythm, no murmur.   Abd: Soft, nontender, nondistended.   MSK: Moves extremities spontaneously .  Skin: No lesions or rashes.  Neuro: Aox3. Moving extremities. + tremors.       Data Review:    LABS:   Lab Results   Component Value Date    WBC 13.3 06/27/2025    HGB 9.3 06/27/2025    HCT 27.0 06/27/2025    PLT 99.0 06/27/2025    CREATSERUM 2.49 06/27/2025    BUN 37 06/27/2025     06/27/2025    K 4.2 06/27/2025     06/27/2025    CO2 21.0 06/27/2025     06/27/2025    CA 8.2 06/27/2025    MG 1.8 06/27/2025    PGLU 145 06/27/2025       All lab work and imaging personally reviewed.    Assessment/Plan:     Assessment/ Plan:     Patient is a 89 year old female with with history of HTN, HLD, CAD, type II DM with neuropathy, and depression presented for acute rectal bleeding.      #Acute on chronic anemia 2/2 rectal bleeding  #Colitis  -CT with diffuse colitis, C-diff neg, stool path pcr neg  -s/p 3 units prbc.  -Supportive care   -D/W GI Dr. Gibbons and patient's daughter.  She has a high likelihood of clinical deterioration, will continue conservative management for now.  Discussed option of hospice and patient and daughter agreeable if clinical status worsens. Hospice consulted. Plan is to transport back to her [facility]/ [facility] hospice tomorrow.     #Chronic tremors  -Appears to be worsening.  No intervention warranted at this time per neuro.      #CKD  -monitor      #HTN  #HLD  #CAD  #Type II DM  -accuchecks,  holding home antihypertensives     DVT ppx: SCDs  Diet:GI soft  Disposition: Hospice  Code status: DNR comfort care    D/W RN      Outpatient records or previous hospital records reviewed.   [facility] hospitalist to continue to follow patient while in house.     Dheeraj Gregory DO  [facility]

## 2025-06-27 NOTE — PLAN OF CARE
Received pt in bed. Alert and oriented. All meds given per MAR. Denies any pain. No nausea/vomiting noted. Right arm swollen for suspected IV infiltration, cold compress applied, right arm kept elevated. No new complaints voiced out. Slept well. Safety measures in place. Call light within reach.    Problem: SKIN/TISSUE INTEGRITY - ADULT  Goal: Skin integrity remains intact  Description: INTERVENTIONS  - Assess and document risk factors for pressure ulcer development  - Assess and document skin integrity  - Monitor for areas of redness and/or skin breakdown  - Initiate interventions, skin care algorithm/standards of care as needed  Outcome: Progressing     Problem: HEMATOLOGIC - ADULT  Goal: Maintains hematologic stability  Description: INTERVENTIONS  - Assess for signs and symptoms of bleeding or hemorrhage  - Monitor labs and vital signs for trends  - Administer supportive blood products/factors, fluids and medications as ordered and appropriate  - Administer supportive blood products/factors as ordered and appropriate  Outcome: Progressing  Goal: Free from bleeding injury  Description: (Example usage: patient with low platelets)  INTERVENTIONS:  - Avoid intramuscular injections, enemas and rectal medication administration  - Ensure safe mobilization of patient  - Hold pressure on venipuncture sites to achieve adequate hemostasis  - Assess for signs and symptoms of internal bleeding  - Monitor lab trends  - Patient is to report abnormal signs of bleeding to staff  - Avoid use of toothpicks and dental floss  - Use electric shaver for shaving  - Use soft bristle tooth brush  - Limit straining and forceful nose blowing  Outcome: Progressing

## 2025-06-27 NOTE — PROGRESS NOTES
[facility] Hospitalist Progress Note     Subjective:  Episode of emesis and hypoxia overnight.  Resting in bed this am, appears more lethargic.  Family at bedside.    Review of Systems  Comprehensive ROS reviewed and negative except for what is stated in HPI.      OBJECTIVE:  /86 (BP Location: Right arm)   Pulse 88   Temp 98.2 °F (36.8 °C) (Oral)   Resp 18   Ht 5' 1\" (1.549 m)   Wt 112 lb 6.4 oz (51 kg)   SpO2 95%   BMI 21.24 kg/m²   General: Resting in bed. Cooperative.   HEENT:  EOMI, PERRLA.  Pulm: Normal respiratory effort.  CV: Regular rate and rhythm, no murmur.   Abd: Soft, nontender, nondistended.   MSK: Moves extremities spontaneously .  Skin: No lesions or rashes.  Neuro: Aox3. Moving extremities. + tremors.       Data Review:    LABS:   Lab Results   Component Value Date    WBC 11.3 06/26/2025    HGB 9.3 06/26/2025    HCT 20.8 06/26/2025    PLT 78.0 06/26/2025    CREATSERUM 1.94 06/26/2025    BUN 38 06/26/2025     06/26/2025    K 4.0 06/26/2025     06/26/2025    CO2 19.0 06/26/2025     06/26/2025    CA 7.7 06/26/2025    MG 1.4 06/26/2025    PGLU 222 06/26/2025       All lab work and imaging personally reviewed.    Assessment/Plan:     Assessment/ Plan:     Patient is a 89 year old female with with history of HTN, HLD, CAD, type II DM with neuropathy, and depression presented for acute rectal bleeding.      #Acute on chronic anemia 2/2 rectal bleeding  #Colitis  -CT with diffuse colitis, C-diff neg, stool path pcr pending  -s/p 3 units prbc, trend Hgb.  Transfuse as needed.  -empiric abx  -Continue PPI, supportive care   -Advance diet as tolerated  -D/W GI Dr. Gibbons and patient's daughter.  She has a high likelihood of clinical deterioration, will continue conservative management for now.  Discussed option of hospice and patient and daughter agreeable if clinical status worsens. Hospice consulted.     #Chronic tremors  -Appears to be worsening.  No intervention  warranted at this time per neuro.      #CKD  -monitor      #HTN  #HLD  #CAD  #Type II DM  -accuchecks, holding home antihypertensives     DVT ppx: SCDs  Diet:GI soft  Disposition: pending meeting w/ hospice  Code status: DNR/ DNI    D/W RN and GI     Outpatient records or previous hospital records reviewed.   [facility] hospitalist to continue to follow patient while in house.     Dheeraj Gregory DO  [facility] Hospitalist

## 2025-06-27 NOTE — CM/SW NOTE
06/27/25 1200   Discharge disposition   Expected discharge disposition Hospice/Home   Post Acute Care Provider   ([facility])   Discharge transportation [facility]     [facility] hosp[facility] did eval of pt today.  DME has been ordered for pt to return to [place].  Plan is to dc to [place] tomorrow am.  [facility] arranged for 1130am.  RN to call report to [phone].    Shanita Parker LCSW  /Discharge Planner

## 2025-06-28 VITALS
RESPIRATION RATE: 20 BRPM | HEIGHT: 61 IN | TEMPERATURE: 99 F | DIASTOLIC BLOOD PRESSURE: 83 MMHG | SYSTOLIC BLOOD PRESSURE: 136 MMHG | BODY MASS INDEX: 21.22 KG/M2 | WEIGHT: 112.38 LBS | OXYGEN SATURATION: 100 % | HEART RATE: 94 BPM

## 2025-06-28 LAB
GLUCOSE BLD-MCNC: 256 MG/DL (ref 70–99)
GLUCOSE BLD-MCNC: 293 MG/DL (ref 70–99)

## 2025-06-28 NOTE — PLAN OF CARE
Problem: METABOLIC/FLUID AND ELECTROLYTES - ADULT  Goal: Glucose maintained within prescribed range  Description: INTERVENTIONS:  - Monitor Blood Glucose as ordered  - Assess for signs and symptoms of hyperglycemia and hypoglycemia  - Administer ordered medications to maintain glucose within target range  - Assess barriers to adequate nutritional intake and initiate nutrition consult as needed  - Instruct patient on self management of diabetes  Outcome: Progressing     Problem: SKIN/TISSUE INTEGRITY - ADULT  Goal: Skin integrity remains intact  Description: INTERVENTIONS  - Assess and document risk factors for pressure ulcer development  - Assess and document skin integrity  - Monitor for areas of redness and/or skin breakdown  - Initiate interventions, skin care algorithm/standards of care as needed  Outcome: Progressing     Problem: HEMATOLOGIC - ADULT  Goal: Maintains hematologic stability  Description: INTERVENTIONS  - Assess for signs and symptoms of bleeding or hemorrhage  - Monitor labs and vital signs for trends  - Administer supportive blood products/factors, fluids and medications as ordered and appropriate  - Administer supportive blood products/factors as ordered and appropriate  Outcome: Progressing  Goal: Free from bleeding injury  Description: (Example usage: patient with low platelets)  INTERVENTIONS:  - Avoid intramuscular injections, enemas and rectal medication administration  - Ensure safe mobilization of patient  - Hold pressure on venipuncture sites to achieve adequate hemostasis  - Assess for signs and symptoms of internal bleeding  - Monitor lab trends  - Patient is to report abnormal signs of bleeding to staff  - Avoid use of toothpicks and dental floss  - Use electric shaver for shaving  - Use soft bristle tooth brush  - Limit straining and forceful nose blowing  Outcome: Progressing     Patient alert and oriented X4, daughter/POA Jaymie at bedside. POLST form reviewed and Jaymie  verbalizes understanding and desire for [facility] COMFORT. Form signed, original and copy given to daughter. Copies placed in chart and sent with patient for ambulance and [facility]. Patient w edema and warmth to R arm, ice applied and lidocaine as ordered, pt declines need for acetaminophen. Dentures/partial denture w patient,. Pt and family report that hearing aids are at [facility] and family has glasses. Full report called to [facility], spoke with Eliana. Patient will DC to hospice care at [facility]. All paperwork provided to ambulance staff. All belongings with patient. Redness/pink coloration remains in place to sacrum, R heel with redness, L heel with very mild redness, elevation/offloading and turns provided.    Patient discharged approx 1145 via ambulance, daughters at bedside. All belongings with patient and family. All paperwork given to ambulance staff.

## 2025-06-28 NOTE — PLAN OF CARE
Patient is alert and oriented. Afebrile. No shortness of breath noted, on supplemental O2 @ 2L/NC. Complains of right arm pain with swelling. Scheduled meds given per MAR tolerated well. Call light within reach. Safety precautions in place. Continue monitor. Endorse.

## 2025-06-28 NOTE — DISCHARGE SUMMARY
[facility] Hospitalist Discharge Summary     Patient ID:  Shi Dhillon  89 year old  12/21/1935    Admission Date/Time  6/21/2025  5:41 AM  Discharge Date  06/28/25    PCP  Breonna Cabral MD     Discharging Hospitalist:  Dheeraj Gregory DO    Disposition:  Hospice at [facility]    Follow Up Appointments  Breonna Cabral MD  [address] [address]  [phone]    Follow up        Primary Hospital Problems/Hospital Course Summary  Patient is a 89 year old female with with history of HTN, HLD, CAD, type II DM with neuropathy, and depression presented for acute rectal bleeding.      #Acute on chronic anemia 2/2 rectal bleeding  #Colitis  -CT with diffuse colitis, C-diff neg, stool path pcr neg  -s/p 3 units prbc.  -Supportive care   -D/W GI Dr. Gibbons and patient's daughter.  She has a high likelihood of clinical deterioration, will continue conservative management for now.  Discussed option of hospice and patient and daughter agreeable if clinical status worsens. Hospice consulted. Plan is to transport back to her [facility] w/ [facility] hospice tomorrow.     Medication Changes  NA    Important Follow Up Items  Labs: NA  Incidental Findings: NA    Procedures/Diagnostics  NA    Operative Procedures:   NA    Change in Code Status: NA      Hospital Course/Secondary Diagnoses:      #Chronic tremors  -Appears to be worsening.  No intervention warranted at this time per neuro.       #CKD  #HTN  #HLD  #CAD  #Type II DM    Consults: IP CONSULT TO GASTROENTEROLOGY  IP CONSULT TO HOSPITALIST  NURSING CONSULT TO DIETITIAN  IP CONSULT TO SPIRITUAL CARE  IP CONSULT TO NEUROLOGY  IP CONSULT TO HOSPICE  IP CONSULT TO HOSPICE  IP CONSULT TO VASCULAR ACCESS TEAM    Discharge Medications       Medication List        STOP taking these medications      acetaminophen 325 MG Tabs  Commonly known as: Tylenol     amLODIPine 5 MG Tabs  Commonly  known as: Norvasc     aspirin 81 MG Tbec     atorvastatin 10 MG Tabs  Commonly known as: Lipitor     bisacodyl 10 MG Supp  Commonly known as: Dulcolax     bumetanide 2 MG Tabs  Commonly known as: Bumex     carvedilol 12.5 MG Tabs  Commonly known as: Coreg     cholecalciferol 50 MCG (2000 UT) Caps  Commonly known as: Vitamin D3     cyanocobalamin 1000 MCG Tabs  Commonly known as: Vitamin B12     DULoxetine 20 MG Cpep  Commonly known as: Cymbalta     Ferrous Fumarate 325 (106 Fe) MG Tabs     gabapentin 100 MG Caps  Commonly known as: Neurontin     glimepiride 1 MG Tabs  Commonly known as: Amaryl     HumaLOG Arya KwikPen 100 UNIT/ML Sopn  Generic drug: Insulin Lispro (0.5 Unit Dial)     hydrALAZINE 100 MG Tabs  Commonly known as: Apresoline     isosorbide mononitrate ER 30 MG Tb24  Commonly known as: Imdur     Jardiance 10 MG Tabs  Generic drug: empagliflozin     lidocaine-menthol 4-1 % Ptch     magnesium 250 MG Tabs     meclizine 25 MG Tabs  Commonly known as: Antivert     ondansetron 4 mg tablet  Commonly known as: Zofran     Polyethylene Glycol 3350 17 g Pack  Commonly known as: MIRALAX     saccharomyces boulardii 250 MG Caps  Commonly known as: Florastor     senna-docusate 8.6-50 MG Tabs  Commonly known as: Senokot-S     traMADol 50 MG Tabs  Commonly known as: Ultram            I reconciled current and discharge medications on the day of discharge.    Imaging/Diagnostic Reports  CT ABDOMEN+PELVIS(CPT=74176)  Result Date: 6/21/2025  PROCEDURE:  CT ABDOMEN+PELVIS (CPT=74176)  COMPARISON:  None.  INDICATIONS:  gi bleed,  TECHNIQUE:  Unenhanced multislice CT scanning was performed from the dome of the diaphragm to the pubic symphysis.  Dose reduction techniques were used. Dose information is transmitted to the ACR (American College of Radiology) NRDR (National Radiology Data Registry) which includes the Dose Index Registry.  PATIENT STATED HISTORY: (As transcribed by Technologist)  vomiting blood, gfr 17    FINDINGS:   Sensitivity decreased without IV or oral contrast.  The patient also could not raise her arms, resulting in artifact. LIVER:  Calcifications consistent with prior granulomatous disease.  Smooth contour. BILIARY:  Absent gallbladder, presumably surgical in nature.  No intrahepatic biliary dilatation.  CBD measured up to 1.6 cm. PANCREAS:  Uniform parenchyma.  No ductal dilatation. SPLEEN:  Not enlarged.  Coarse calcifications consistent with prior granulomatous disease. KIDNEYS:  Normal anatomic positions.  No hydronephrosis.  Rounded low-attenuation foci are seen bilaterally with Hounsfield units reflecting fluid consistent with small cortical cysts.  The largest is on the right measuring 2.1 cm. ADRENALS:  Not enlarged. AORTA/VASCULAR:    Heavily calcified arteries throughout.  No abdominal aortic aneurysm. RETROPERITONEUM:  No adenopathy. BOWEL/MESENTERY:  Normal bowel caliber.  Diffusely prominent:  Throughout.  No associated pericolonic stranding.  No free air.  The appendix is poorly visualized and may be absent or compressed.  No changes of acute appendicitis. ABDOMINAL WALL:  No hernia. URINARY BLADDER:  Distended.  Smooth contour.  No wall thickening or calculus within. PELVIC NODES:  None enlarged. PELVIC ORGANS:  Surgically absent uterus.  There is a lobulated fluid collection in the pelvis, circumferentially around the distal sigmoid and rectum.  12.5 x 9.9 cm.  Hounsfield units 14-15.  BONES:  Severe degenerative changes.  Moderate apex left lumbar scoliosis.  Right hip arthroplasty.  Artifact obscures adjacent anatomy. LUNG BASES:  Reticular nodular opacities are seen at both lung bases, right greater than left.  No pleural effusion. OTHER:  None.              CONCLUSION:  1. Sensitivity decreased without IV or oral contrast.  The study is further compromised as the patient could not raise her arms. 2. There is a fluid collection in pelvis.  It is nonspecific and may be loculated ascites.  The uterus  is absent and this may be a chronic postoperative fluid collection.  No additional imaging findings suggesting infection.  Clinical correlation in this  regard recommended. 3. Diffusely prominent colon.  This may be due to under distension as there is no pericolonic stranding.  Low-grade or chronic colitis is possible.  Please correlate with clinical suspicion. 4. Nonvisualization of the gallbladder, presumably related to prior cholecystectomy.  Dilated common bile duct.  Correlation with LFTs recommended. 5. Reticular nodular opacities at the lung base.  These may be chronic postinflammatory/infectious in nature as there is evidence of prior granulomatous disease elsewhere.  Please correlate with any acute pulmonary symptoms. 6. Details as above.  Continued clinical correlation and follow-up recommended.  Preliminary report given by [employer].    LOCATION:  [facility]   Dictated by (CST): Luis Guzman MD on 6/21/2025 at 8:44 AM     Finalized by (CST): Luis Guzman MD on 6/21/2025 at 8:56 AM           Patient instructions:      I as the attending physician reconciled the current and discharge medications on day of discharge.     Discharge Medication List as of 6/28/2025 11:28 AM        STOP taking these medications       Ferrous Fumarate 325 (106 Fe) MG Oral Tab        bumetanide 2 MG Oral Tab        DULoxetine 20 MG Oral Cap DR Particles        gabapentin 100 MG Oral Cap        isosorbide mononitrate ER 30 MG Oral Tablet 24 Hr        carvedilol 12.5 MG Oral Tab        glimepiride 1 MG Oral Tab        atorvastatin 10 MG Oral Tab        aspirin 81 MG Oral Tab EC        saccharomyces boulardii 250 MG Oral Cap        cholecalciferol 50 MCG (2000 UT) Oral Cap        cyanocobalamin 1000 MCG Oral Tab        lidocaine-menthol 4-1 % External Patch        bisacodyl 10 MG Rectal Suppos        polyethylene glycol, PEG 3350, 17 g Oral Powd Pack        senna-docusate 8.6-50 MG Oral Tab        magnesium 250 MG Oral Tab         amLODIPine 5 MG Oral Tab        Insulin Lispro, 0.5 Unit Dial, (HUMALOG KAMRAN KWIKPEN) 100 UNIT/ML Subcutaneous Solution Pen-injector        ondansetron (ZOFRAN) 4 mg tablet        empagliflozin (JARDIANCE) 10 MG Oral Tab        traMADol 50 MG Oral Tab        hydrALAZINE 100 MG Oral Tab        meclizine 25 MG Oral Tab        acetaminophen 325 MG Oral Tab                    Exam on day of discharge:     Vitals:    06/28/25 0545   BP: 136/83   Pulse: 94   Resp:    Temp:        Physical Exam:   General: Resting in bed. Cooperative.   HEENT:  EOMI, PERRLA.  Pulm: Normal respiratory effort.  CV: Regular rate and rhythm, no murmur.   Abd: Soft, nontender, nondistended.   MSK: Moves extremities spontaneously .  Skin: No lesions or rashes.  Neuro: Aox3. Moving extremities. + tremors.       Total time coordinating care for discharge: 33 minutes    Dheeraj Gregory DO

## 2025-07-10 NOTE — PAYOR COMM NOTE
--------------  DISCHARGE REVIEW    Payor: [employer] PPO  Subscriber #:  [id number]  Authorization Number: [id number]    Admit date: 6/21/25  Admit time:   8:09 AM  Discharge Date: 6/28/2025 11:45 AM     Admitting Physician: Sophy Martínez MD  Attending Physician:  No att. providers found  Primary Care Physician: Breonna Cabral MD          Discharge Summary Notes        Discharge Summary signed by Dheearj Gregory DO at 6/29/2025 11:41 PM       Author: Dheeraj Gregory DO Specialty: HOSPITALIST Author Type: Physician    Filed: 6/29/2025 11:41 PM Date of Service: 6/28/2025  1:40 PM Status: Signed    : Dheeraj Gregory DO (Physician)                                                          [facility] Internal Medicine Hospitalist Discharge Summary     Patient ID:  Shi Dhillon  89 year old  12/21/1935    Admission Date/Time  6/21/2025  5:41 AM  Discharge Date  06/28/25    PCP  Breonna Cabral MD     Discharging Hospitalist:  Dheeraj Gregory DO    Disposition:  Hospice at [facility]    Follow Up Appointments  Breonna Cabral MD  [facility] [address]  [phone]    Follow up        Primary Hospital Problems/Hospital Course Summary  Patient is a 89 year old female with with history of HTN, HLD, CAD, type II DM with neuropathy, and depression presented for acute rectal bleeding.      #Acute on chronic anemia 2/2 rectal bleeding  #Colitis  -CT with diffuse colitis, C-diff neg, stool path pcr neg  -s/p 3 units prbc.  -Supportive care   -D/W GI Dr. Gibbons and patient's daughter.  She has a high likelihood of clinical deterioration, will continue conservative management for now.  Discussed option of hospice and patient and daughter agreeable if clinical status worsens. Hospice consulted. Plan is to transport back to her [facility] w/ [facility] tomorrow.     Medication Changes  NA    Important Follow Up Items  Labs: NA  Incidental Findings:  NA    Procedures/Diagnostics  NA    Operative Procedures:   NA    Change in Code Status: NA      Hospital Course/Secondary Diagnoses:      #Chronic tremors  -Appears to be worsening.  No intervention warranted at this time per neuro.       #CKD  #HTN  #HLD  #CAD  #Type II DM    Consults: IP CONSULT TO GASTROENTEROLOGY  IP CONSULT TO HOSPITALIST  NURSING CONSULT TO DIETITIAN  IP CONSULT TO SPIRITUAL CARE  IP CONSULT TO NEUROLOGY  IP CONSULT TO HOSPICE  IP CONSULT TO HOSPICE  IP CONSULT TO VASCULAR ACCESS TEAM    Discharge Medications       Medication List        STOP taking these medications      acetaminophen 325 MG Tabs  Commonly known as: Tylenol     amLODIPine 5 MG Tabs  Commonly known as: Norvasc     aspirin 81 MG Tbec     atorvastatin 10 MG Tabs  Commonly known as: Lipitor     bisacodyl 10 MG Supp  Commonly known as: Dulcolax     bumetanide 2 MG Tabs  Commonly known as: Bumex     carvedilol 12.5 MG Tabs  Commonly known as: Coreg     cholecalciferol 50 MCG (2000 UT) Caps  Commonly known as: Vitamin D3     cyanocobalamin 1000 MCG Tabs  Commonly known as: Vitamin B12     DULoxetine 20 MG Cpep  Commonly known as: Cymbalta     Ferrous Fumarate 325 (106 Fe) MG Tabs     gabapentin 100 MG Caps  Commonly known as: Neurontin     glimepiride 1 MG Tabs  Commonly known as: Amaryl     HumaLOG Arya KwikPen 100 UNIT/ML Sopn  Generic drug: Insulin Lispro (0.5 Unit Dial)     hydrALAZINE 100 MG Tabs  Commonly known as: Apresoline     isosorbide mononitrate ER 30 MG Tb24  Commonly known as: Imdur     Jardiance 10 MG Tabs  Generic drug: empagliflozin     lidocaine-menthol 4-1 % Ptch     magnesium 250 MG Tabs     meclizine 25 MG Tabs  Commonly known as: Antivert     ondansetron 4 mg tablet  Commonly known as: Zofran     Polyethylene Glycol 3350 17 g Pack  Commonly known as: MIRALAX     saccharomyces boulardii 250 MG Caps  Commonly known as: Florastor     senna-docusate 8.6-50 MG Tabs  Commonly known as: Senokot-S     traMADol 50  MG Tabs  Commonly known as: Ultram            I reconciled current and discharge medications on the day of discharge.    Imaging/Diagnostic Reports  CT ABDOMEN+PELVIS(CPT=74176)  Result Date: 6/21/2025  PROCEDURE:  CT ABDOMEN+PELVIS (CPT=74176)  COMPARISON:  None.  INDICATIONS:  gi bleed,  TECHNIQUE:  Unenhanced multislice CT scanning was performed from the dome of the diaphragm to the pubic symphysis.  Dose reduction techniques were used. Dose information is transmitted to the ACR (American College of Radiology) NRDR (National Radiology Data Registry) which includes the Dose Index Registry.  PATIENT STATED HISTORY: (As transcribed by Technologist)  vomiting blood, gfr 17    FINDINGS:  Sensitivity decreased without IV or oral contrast.  The patient also could not raise her arms, resulting in artifact. LIVER:  Calcifications consistent with prior granulomatous disease.  Smooth contour. BILIARY:  Absent gallbladder, presumably surgical in nature.  No intrahepatic biliary dilatation.  CBD measured up to 1.6 cm. PANCREAS:  Uniform parenchyma.  No ductal dilatation. SPLEEN:  Not enlarged.  Coarse calcifications consistent with prior granulomatous disease. KIDNEYS:  Normal anatomic positions.  No hydronephrosis.  Rounded low-attenuation foci are seen bilaterally with Hounsfield units reflecting fluid consistent with small cortical cysts.  The largest is on the right measuring 2.1 cm. ADRENALS:  Not enlarged. AORTA/VASCULAR:    Heavily calcified arteries throughout.  No abdominal aortic aneurysm. RETROPERITONEUM:  No adenopathy. BOWEL/MESENTERY:  Normal bowel caliber.  Diffusely prominent:  Throughout.  No associated pericolonic stranding.  No free air.  The appendix is poorly visualized and may be absent or compressed.  No changes of acute appendicitis. ABDOMINAL WALL:  No hernia. URINARY BLADDER:  Distended.  Smooth contour.  No wall thickening or calculus within. PELVIC NODES:  None enlarged. PELVIC ORGANS:  Surgically  absent uterus.  There is a lobulated fluid collection in the pelvis, circumferentially around the distal sigmoid and rectum.  12.5 x 9.9 cm.  Hounsfield units 14-15.  BONES:  Severe degenerative changes.  Moderate apex left lumbar scoliosis.  Right hip arthroplasty.  Artifact obscures adjacent anatomy. LUNG BASES:  Reticular nodular opacities are seen at both lung bases, right greater than left.  No pleural effusion. OTHER:  None.              CONCLUSION:  1. Sensitivity decreased without IV or oral contrast.  The study is further compromised as the patient could not raise her arms. 2. There is a fluid collection in pelvis.  It is nonspecific and may be loculated ascites.  The uterus is absent and this may be a chronic postoperative fluid collection.  No additional imaging findings suggesting infection.  Clinical correlation in this  regard recommended. 3. Diffusely prominent colon.  This may be due to under distension as there is no pericolonic stranding.  Low-grade or chronic colitis is possible.  Please correlate with clinical suspicion. 4. Nonvisualization of the gallbladder, presumably related to prior cholecystectomy.  Dilated common bile duct.  Correlation with LFTs recommended. 5. Reticular nodular opacities at the lung base.  These may be chronic postinflammatory/infectious in nature as there is evidence of prior granulomatous disease elsewhere.  Please correlate with any acute pulmonary symptoms. 6. Details as above.  Continued clinical correlation and follow-up recommended.  Preliminary report given by "[employer] [facility].    LOCATION:  [place]   Dictated by (CST): Luis Guzman MD on 6/21/2025 at 8:44 AM     Finalized by (CST): Luis Guzman MD on 6/21/2025 at 8:56 AM           Patient instructions:      I as the attending physician reconciled the current and discharge medications on day of discharge.     Discharge Medication List as of 6/28/2025 11:28 AM        STOP taking these medications       Ferrous  Fumarate 325 (106 Fe) MG Oral Tab        bumetanide 2 MG Oral Tab        DULoxetine 20 MG Oral Cap DR Particles        gabapentin 100 MG Oral Cap        isosorbide mononitrate ER 30 MG Oral Tablet 24 Hr        carvedilol 12.5 MG Oral Tab        glimepiride 1 MG Oral Tab        atorvastatin 10 MG Oral Tab        aspirin 81 MG Oral Tab EC        saccharomyces boulardii 250 MG Oral Cap        cholecalciferol 50 MCG (2000 UT) Oral Cap        cyanocobalamin 1000 MCG Oral Tab        lidocaine-menthol 4-1 % External Patch        bisacodyl 10 MG Rectal Suppos        polyethylene glycol, PEG 3350, 17 g Oral Powd Pack        senna-docusate 8.6-50 MG Oral Tab        magnesium 250 MG Oral Tab        amLODIPine 5 MG Oral Tab        Insulin Lispro, 0.5 Unit Dial, (HUMALOG KAMRAN KWIKPEN) 100 UNIT/ML Subcutaneous Solution Pen-injector        ondansetron (ZOFRAN) 4 mg tablet        empagliflozin (JARDIANCE) 10 MG Oral Tab        traMADol 50 MG Oral Tab        hydrALAZINE 100 MG Oral Tab        meclizine 25 MG Oral Tab        acetaminophen 325 MG Oral Tab                    Exam on day of discharge:     Vitals:    06/28/25 0545   BP: 136/83   Pulse: 94   Resp:    Temp:        Physical Exam:   General: Resting in bed. Cooperative.   HEENT:  EOMI, PERRLA.  Pulm: Normal respiratory effort.  CV: Regular rate and rhythm, no murmur.   Abd: Soft, nontender, nondistended.   MSK: Moves extremities spontaneously .  Skin: No lesions or rashes.  Neuro: Aox3. Moving extremities. + tremors.       Total time coordinating care for discharge: 33 minutes    Dheeraj Gregory DO           Electronically signed by Dheeraj Gregory DO on 6/29/2025 11:41 PM         REVIEWER COMMENTS

## (undated) NOTE — LETTER
AUTHORIZATION FOR SURGICAL OPERATION OR OTHER PROCEDURE    1. I hereby authorize Dr. Guy Gtz and the [facility] staff assigned to my case to perform the following operation and/or procedure at the [facility]:    Right shoulder injection and aspiration with ultrasound guidance       2.  My physician has explained the nature and purpose of the operation or other procedure, possible alternative methods of treatment, the risks involved, and the possibility of complication to me.  I acknowledge that no guarantee has been made as to the result that may be obtained.  3.  I recognize that, during the course of this operation, or other procedure, unforseen conditions may necessitate additional or different procedure than those listed above.  I, therefore, further authorize and request that the above named physician, his/her physician assistants or designees perform such procedures as are, in his/her professional opinion, necessary and desirable.  4.  Any tissue or organs removed in the operation or other procedure may be disposed of by and at the discretion of the [facility] staff and [facility].  5.  I understand that in the event of a medical emergency, I will be transported by local paramedics to [facility] or other hospital emergency department.  6.  I certify that I have read and fully understand the above consent to operation and/or other procedure.    7.  I acknowledge that my physician has explained sedation/analgesia administration to me including the risks and benefits.  I consent to the administration of sedation/analgesia as may be necessary or desirable in the judgement of my physician.    Witness signature: ___________________________________________________ Date:  ______/______/_____                    Time:  ________ A.M.  P.M.       Patient Name: Shi Dhillon  12/21/1935  [id number]         Patient signature:   ___________________________________________________        Statement of Physician  My signature below affirms that prior to the time of the procedure, I have explained to the patient and/or his/her guardian, the risks and benefits involved in the proposed treatment and any reasonable alternative to the proposed treatment.  I have also explained the risks and benefits involved in the refusal of the proposed treatment and have answered the patient's questions.                        Date:  ______/______/_______  Provider                      Signature:  __________________________________________________________       Time:  ___________ AMARGO QURESHI

## (undated) NOTE — LETTER
[facility]  [address] [address]  [phone]    Blood Transfusion Consent    In the course of your treatment, it may become necessary to administer a transfusion of blood or blood components. This form provides basic information concerning this procedure and, if signed by you, authorizes its administration. By signing this form, you agree that all of your questions about the administration of blood or blood products have been answered by the ordering medical professional or designee.    Description of Procedure  Blood is introduced into one of your veins, commonly in the arm, using a sterilized disposable needle. The amount of blood transfused, and whether the transfusion will be of blood or blood components is a judgement the physician will make based on your particular needs.    Risks  The transfusion is a common procedure of low risk.  MINOR AND TEMPORARY REACTIONS ARE NOT UNCOMMON, including a slight bruise, swelling or local reaction in the area where the needle pierces your skin, or a nonserious reaction to the transfused material itself, including headache, fever or mild skin reaction, such as rash.  Serious reactions are possible, though very unlikely, and include severe allergic reaction (shock) and destruction (hemolysis) of transfused blood cells.  Infectious diseases which are known to be transmitted by blood transfusion include certain types of viral Hepatitis(liver infection from a virus), Human Immunodeficiency Virus (HIV-1,2) infection, a viral infection known to cause Acquired Immunodeficiency Syndrome (AIDS), as well as certain other bacterial, viral, and parasitic diseases. While a minimal risk of acquiring an infectious disease from transfused blood exists, in accordance with the Federal and State law, all due care has been taken in donor selection and testing to avoid transmission of disease.    Alternatives  If loss of blood poses serious threats during your  treatment, THERE IS NO EFFECTIVE ALTERNATIVE TO BLOOD TRANSFUSION. However, if you have any further questions on this matter, your provider will fully explain the alternatives to you if it has not already been done.    I, ______________________________, have read/had read to me the above. I understand the matters bearing on the decision whether or not to authorize a transfusion of blood or blood components. I have no questions which have not been answered to my full satisfaction. I hereby consent to such transfusion as my physician may deem necessary or advisable in the course of my treatment.    ______________________________________________                    ___________________________  (Signature of Patient or Responsible party in case of minor,                 (Printed Name of Patient or incompetent, or unconscious patient)              Responsible Party)    ___________________________               _____________________  (Relationship to Patient if not self)                                    (Date and Time)    __________________________                                                           ______________________              (Signature of Witness)               (Printed Name of Witness)     Language line ()    Telephone/Verbal/Video Consent    __________________________                     ____________________  (Signature of 2nd Witness           (Printed Name of 2nd  Telephone/Verbal/Video Consent)           Witness)    Patient Name: Shi Dhillon     : 1935                 Printed: 2025     Medical Record #: [id number]      Rev: 2023

## (undated) NOTE — LETTER
AUTHORIZATION FOR SURGICAL OPERATION OR OTHER PROCEDURE    1. I hereby authorize Dr. Guy Gtz and the [facility] staff assigned to my case to perform the following operation and/or procedure at the [facility]:    Bilateral shoulder injection     2.  My physician has explained the nature and purpose of the operation or other procedure, possible alternative methods of treatment, the risks involved, and the possibility of complication to me.  I acknowledge that no guarantee has been made as to the result that may be obtained.  3.  I recognize that, during the course of this operation, or other procedure, unforseen conditions may necessitate additional or different procedure than those listed above.  I, therefore, further authorize and request that the above named physician, his/her physician assistants or designees perform such procedures as are, in his/her professional opinion, necessary and desirable.  4.  Any tissue or organs removed in the operation or other procedure may be disposed of by and at the discretion of the [facility] staff and [facility].  5.  I understand that in the event of a medical emergency, I will be transported by local paramedics to [facility] or other hospital emergency department.  6.  I certify that I have read and fully understand the above consent to operation and/or other procedure.    7.  I acknowledge that my physician has explained sedation/analgesia administration to me including the risks and benefits.  I consent to the administration of sedation/analgesia as may be necessary or desirable in the judgement of my physician.    Witness signature: ___________________________________________________ Date:  ______/______/_____                    Time:  ________ A.M.  P.M.       Patient Name:   [id number]  12/21/1935   [id number]     Patient signature:  ___________________________________________________               Statement of Physician  My  signature below affirms that prior to the time of the procedure, I have explained to the patient and/or his/her guardian, the risks and benefits involved in the proposed treatment and any reasonable alternative to the proposed treatment.  I have also explained the risks and benefits involved in the refusal of the proposed treatment and have answered the patient's questions.                        Date:  ______/______/_______  Provider                      Signature:  __________________________________________________________       Time:  ___________ A.M    P.M.

## (undated) NOTE — LETTER
[address] 38   Date:   9/23/2020     Name:   Regina Garcia    YOB: 1935   MRN:   [id number]       WHERE IS YOUR PAIN NOW? Juan C the areas on your body where you feel the described sensations.   Use the appropriate

## (undated) NOTE — LETTER
AUTHORIZATION FOR SURGICAL OPERATION OR OTHER PROCEDURE    1. I hereby authorize Dr. Guy Gtz and the [facility] staff assigned to my case to perform the following operation and/or procedure at the [facility]:    Right shoulder injection with ultrasound guidance   Left shoulder injection       2.  My physician has explained the nature and purpose of the operation or other procedure, possible alternative methods of treatment, the risks involved, and the possibility of complication to me.  I acknowledge that no guarantee has been made as to the result that may be obtained.  3.  I recognize that, during the course of this operation, or other procedure, unforseen conditions may necessitate additional or different procedure than those listed above.  I, therefore, further authorize and request that the above named physician, his/her physician assistants or designees perform such procedures as are, in his/her professional opinion, necessary and desirable.  4.  Any tissue or organs removed in the operation or other procedure may be disposed of by and at the discretion of the [facility] staff and [facility].  5.  I understand that in the event of a medical emergency, I will be transported by local paramedics to [facility] or other hospital emergency department.  6.  I certify that I have read and fully understand the above consent to operation and/or other procedure.    7.  I acknowledge that my physician has explained sedation/analgesia administration to me including the risks and benefits.  I consent to the administration of sedation/analgesia as may be necessary or desirable in the judgement of my physician.    Witness signature: ___________________________________________________ Date:  ______/______/_____                    Time:  ________ MARYJO Dhillon  12/21/1935  [id number]         Patient signature:   ___________________________________________________      Statement of Physician  My signature below affirms that prior to the time of the procedure, I have explained to the patient and/or his/her guardian, the risks and benefits involved in the proposed treatment and any reasonable alternative to the proposed treatment.  I have also explained the risks and benefits involved in the refusal of the proposed treatment and have answered the patient's questions.                        Date:  ______/______/_______  Provider                      Signature:  __________________________________________________________       Time:  ___________ AMARGO QURESHI

## (undated) NOTE — LETTER
[facility] SURGERY SCHEDULING FORM   [address] [place] [address] [place] [address]   [phone] (scheduling phone) [phone] (scheduling fax)     PATIENT INFORMATION   Last Name:      Silvia Tay      First Name:    Iesha Schreiber Allergies: Codeine         Completed by:    Placido Gray      Date:    7/29/2020

## (undated) NOTE — LETTER
NICOLE Notifier: [employer]. Patient Name: Shi Dhillon Identification Number: [id number]                          Advance Beneficiary Notice of Noncoverage (ABN)   NOTE:  If Medicare doesn’t pay for D. item/service(s) below, you may have to pay.  Medicare does not pay for everything, even some care that you or your health care provider have good reason to think you need. We expect Medicare may not pay for the D. item/service(s) below.  D. Items or Services  Bilateral shoulder injection  E. Reason Medicare May Not Pay: F. Estimated Cost   __ EKG ($87.00)  __ Pap smear ($101) __Pelvic/Breast ($147.00)  __ Ear Irrigation ($138)  _x_ Injection(s)  ___ Tdap ($181)       ___ Meningitis ($290)   __Prevnar ($555)  ___ Td ($66)              ___ Prevnar 20 ($549)  ___ Hep A ($152)     ___ Prolia ($1827)         __ Xiaflex ($            )   ___ Hep B ($150)     ___ Pneumovax ($287)                                         ___ Vaccine Administration ($65)   __ Medicare does not cover this service      __ Medicare may not pay for this   item/service for your condition     __ Medicare may not pay for this item/service as often as this        WHAT YOU NEED TO DO NOW:  Read this notice, so you can make an informed decision about your care.  Ask us any questions that you may have after you finish reading.  Choose an option below about whether to receive the D. item/service(s) listed above.  Note: If you choose Option 1 or 2, we may help you to use any other insurance that you might have, but Medicare cannot require us to do this.  G. OPTIONS: Check only one box.  We cannot choose a box for you.   OPTION 1. I want the D. item/service(s) listed above. You may ask to be paid now, but I also want Medicare billed for an official decision on payment, which is sent to me on a Medicare Summary Notice (MSN). I understand that if Medicare doesn’t pay, I am responsible for payment, but I can appeal to Medicare by  following the directions on the MSN. If Medicare does pay, you will refund any payments I made to you, less co-pays or deductibles.  OPTION 2. I want the D. item/service(s) listed above, but do not bill Medicare. You may ask to be paid now as I am responsible for payment. I cannot appeal if Medicare is not billed.  OPTION 3. I don't want the D. item/service(s) listed above. I understand with this choice I am not responsible for payment, and I cannot appeal to see if Medicare would pay.    H. Additional Information:    This notice gives our opinion, not an official Medicare decision. If you have other questions on this notice or Medicare billing, call 1-800-MEDICARE (1-713.832.7475/TTY: 1-157.870.4766). Signing below means that you have received and understand this notice. You also receive a copy.  I. Signature: J. Date:       You have the right to get Medicare information in an accessible format, like large print, Braille, or audio. You also have the right to file a complaint if you feel you’ve been discriminated against. Visit Medicare.gov/about- us/kxvfttrmaadno-xqueylgzvqdyvohpb-stuxhn.  According to the Paperwork Reduction Act of 1995, no persons are required to respond to a collection of information unless it displays a valid OMB control number. The valid OMB control number for this information collection is 2351-7405. The time required to complete this information collection is estimated to average 7 minutes per response, including the time to review instructions, search existing data resources, gather the data needed, and complete and review the information collection. If you have comments concerning the accuracy of the time estimate or suggestions for improving this form, please write to: CMS, [facility] Security     Juan José Attn: [employer] Reports Clearance Officer, [place], [place] [address].  Form CMS-R-131 (Exp. 1/31/2026) Form Approved OMB No. [id number]

## (undated) NOTE — LETTER
[facility] [address] , [place]   Date:   2/14/2023     Name:   Osiris Solis    YOB: 1935   MRN:   [id number]       [facility]? Juan C the areas on your body where you feel the described sensations. Use the appropriate symbol. Larance Saltness the areas of radiation. Include all affected areas. Just to complete the picture, please draw in the face. ACHE:  ^ ^ ^   NUMBNESS:  0000   PINS & NEEDLES:  = = = =                              ^ ^ ^                       0000              = = = =                                    ^ ^ ^                       0000            = = = =      BURNING:  XXXX   STABBING: ////                  XXXX                ////                         XXXX          ////     Please juan c the line below indicating your degree of pain right now  with 0 being no pain 10 being the worst pain possible.                                          0             1             2              3             4              5              6              7             8             9             10         Patient Signature:

## (undated) NOTE — LETTER
[facility] [address] , [place]   Date:   5/30/2023     Name:   Emiliano Machado    YOB: 1935   MRN:   [id number]       [facility]? Dax the areas on your body where you feel the described sensations. Use the appropriate symbol. Santy Mandes the areas of radiation. Include all affected areas. Just to complete the picture, please draw in the face. ACHE:  ^ ^ ^   NUMBNESS:  0000   PINS & NEEDLES:  = = = =                              ^ ^ ^                       0000              = = = =                                    ^ ^ ^                       0000            = = = =      BURNING:  XXXX   STABBING: ////                  XXXX                ////                         XXXX          ////     Please dax the line below indicating your degree of pain right now  with 0 being no pain 10 being the worst pain possible.                                          0             1             2              3             4              5              6              7             8             9             10         Patient Signature:

## (undated) NOTE — LETTER
WHERE IS YOUR PAIN NOW?  Dax the areas on your body where you feel the described sensations.  Use the appropriate symbol.  Dax the areas of radiation.  Include all affected areas.  Just to complete the picture, please draw in the face.     ACHE:  ^ ^ ^   NUMBNESS:  0000   PINS & NEEDLES:  = = = =                              ^ ^ ^                       0000              = = = =                                    ^ ^ ^                       0000            = = = =      BURNING:  XXXX   STABBING: ////                  XXXX                ////                         XXXX          ////     Please dax the line below indicating your degree of pain right now  with 0 being no pain 10 being the worst pain possible.                                         0             1             2              3             4              5              6              7             8             9             10         Patient Signature:

## (undated) NOTE — LETTER
Notifier: [employer]       Patient Name: Shi Dhillon       Identification Number: [id number]                          Advance Beneficiary Notice of Noncoverage (ABN)   NOTE:  If Medicare doesn’t pay for D. Items/service(s) below, you may have to pay.  Medicare does not pay for everything, even some care that you or your health care provider have good reason to think you need. We expect Medicare may not pay for the D. items/service(s) below.  Items or Services  Right shoulder injection and aspiration with ultrasound guidance  Reason Medicare May Not Pay: Estimated Cost   __EKG ($129.00)  __Pap smear ($48.23) __Pelvic/Breast ($65.00)  __ Ear Irrigation ($149)  _x_ Injection(s)  ___ Tdap ($70)       ___ Meningitis ($206)   __Prevnar ($285)  ___ Td ($51)            ___Shingrix ($215)        __Prevnar 20 ($309)  ___ Hep A ($156)   ___Prolia ($1827.00)     __ Xiaflex ($              )   ___ Hep B ($167)      __Pneumovax ($155)                                            ___ Vaccine Administration ($31)   __ Medicare does not cover this service      __ Medicare may not pay for this   item/service for your condition     __ Medicare may not pay for this item/service as often as this        WHAT YOU NEED TO DO NOW:  Read this notice, so you can make an informed decision about your care.  Ask us any questions that you may have after you finish reading.  Choose an option below about whether to receive the D. item/service(s)  listed above.  Note: If you choose Option 1 or 2, we may help you to use any other insurance that you might have, but Medicare cannot require us to do this.  OPTIONS: Check only one box.  We cannot choose a box for you.   OPTION 1. I want the D. item/service(s) listed above. You may ask to be paid now, but I also want Medicare billed for an official decision on payment, which is sent to me on a Medicare Summary Notice (MSN). I understand that if Medicare doesn’t pay, I am responsible for payment,  but I can appeal to Medicare by following the directions on the MSN. If Medicare does pay, you will refund any payments I made to you, less co-pays or deductibles.  OPTION 2. I want the D. item/service(s) listed above, but do not bill Medicare. You may ask to be paid now as I am responsible for payment. I cannot appeal if Medicare is not billed.  OPTION 3. I don't want the D. item/service(s) listed above. I understand with this choice I am not responsible for payment, and I cannot appeal to see if Medicare would pay.    H. Additional Information:    This notice gives our opinion, not an official Medicare decision. If you have other questions on this notice or Medicare billing, call 1-800-MEDICARE (1-410.927.6789/TTY: 1-790.497.5041). Signing below means that you have received and understand this notice. You also receive a copy.  Signature: Date:       You have the right to get Medicare information in an accessible format, like large print, Braille, or audio. You also have the right to file a complaint if you feel you’ve been discriminated against. Visit Medicare.gov/about- us/vxzwkohdeeinb-fpavnadocrjitobpx-noyqpv.  According to the Paperwork Reduction Act of 1995, no persons are required to respond to a collection of information unless it displays a valid OMB control number. The valid OMB control number for this information collection is [id number]. The time required to complete this information collection is estimated to average 7 minutes per response, including the time to review instructions, search existing data resources, gather the data needed, and complete and review the information collection. If you have comments concerning the accuracy of the time estimate or suggestions for improving this form, please write to: CMS, [facility] Security     Dianne Cann: CHLOE Reports Clearance Officer, [place], [place] [address].  Form CMS-R-131 (Exp. 1/31/2026) Form Approved OMB No. [id number]

## (undated) NOTE — LETTER
[facility] [address] , [place]   Date:   4/25/2023     Name:   Godwin Ratliff    YOB: 1935   MRN:   [id number]       [facility]? Dax the areas on your body where you feel the described sensations. Use the appropriate symbol. Sherlyn Borjas the areas of radiation. Include all affected areas. Just to complete the picture, please draw in the face. ACHE:  ^ ^ ^   NUMBNESS:  0000   PINS & NEEDLES:  = = = =                              ^ ^ ^                       0000              = = = =                                    ^ ^ ^                       0000            = = = =      BURNING:  XXXX   STABBING: ////                  XXXX                ////                         XXXX          ////     Please dax the line below indicating your degree of pain right now  with 0 being no pain 10 being the worst pain possible.                                          0             1             2              3             4              5              6              7             8             9             10         Patient Signature:

## (undated) NOTE — LETTER
[address], [facility]   Date:   11/16/2022     Name:   Nahum Patino    YOB: 1935   MRN:   [id number]       [facility]? Dax the areas on your body where you feel the described sensations. Use the appropriate symbol. Tomjuan Danialucy the areas of radiation. Include all affected areas. Just to complete the picture, please draw in the face. ACHE:  ^ ^ ^   NUMBNESS:  0000   PINS & NEEDLES:  = = = =                              ^ ^ ^                       0000              = = = =                                    ^ ^ ^                       0000            = = = =      BURNING:  XXXX   STABBING: ////                  XXXX                ////                         XXXX          ////     Please dax the line below indicating your degree of pain right now  with 0 being no pain 10 being the worst pain possible.                                          0             1             2              3             4              5              6              7             8             9             10         Patient Signature:

## (undated) NOTE — LETTER
AUTHORIZATION FOR SURGICAL OPERATION OR OTHER PROCEDURE    1. I hereby authorize Dr. Albert Limon and the [facility] staff assigned to my case to perform the following operation and/or procedure at the [facility]:    Right shoulder injection/ aspiration    2. My physician has explained the nature and purpose of the operation or other procedure, possible alternative methods of treatment, the risks involved, and the possibility of complication to me. I acknowledge that no guarantee has been made as to the result that may be obtained. 3.  I recognize that, during the course of this operation, or other procedure, unforseen conditions may necessitate additional or different procedure than those listed above. I, therefore, further authorize and request that the above named physician, his/her physician assistants or designees perform such procedures as are, in his/her professional opinion, necessary and desirable. 4.  Any tissue or organs removed in the operation or other procedure may be disposed of by and at the discretion of the [facility] staff and [facility]. 5.  I understand that in the event of a medical emergency, I will be transported by local paramedics to [facility] or other hospital emergency department. 6.  I certify that I have read and fully understand the above consent to operation and/or other procedure. 7.  I acknowledge that my physician has explained sedation/analgesia administration to me including the risks and benefits. I consent to the administration of sedation/analgesia as may be necessary or desirable in the judgement of my physician. Witness signature: ___________________________________________________ Date:  ______/______/_____                    Time:  ________ A. M.  P.M.        Patient Name:  Humera Quezada  [id number]  12/21/1935         Patient signature:  ___________________________________________________                Statement of Physician  My signature below affirms that prior to the time of the procedure, I have explained to the patient and/or his/her guardian, the risks and benefits involved in the proposed treatment and any reasonable alternative to the proposed treatment. I have also explained the risks and benefits involved in the refusal of the proposed treatment and have answered the patient's questions.                         Date:  ______/______/_______  Provider                      Signature:  __________________________________________________________       Time:  ___________ A.M    P.M.

## (undated) NOTE — LETTER
[address]   Date:   8/19/2020     Name:   Antonio Garcia    YOB: 1935   MRN:   [id number]       WHERE IS YOUR PAIN NOW? Juan C the areas on your body where you feel the described sensations.   Use the appropriate

## (undated) NOTE — MR AVS SNAPSHOT
After Visit Summary   5/30/2023    Concepcion Colvin   MRN: [id number]           Visit Information     Date & Time  5/30/2023 11:15 AM Provider  Reynold Mo MD Department  [address], [address] [facility],[address], [facility]. Phone  [phone]      Allergies as of 5/30/2023  Review status set to Review Complete on 3/22/2023       Noted Reaction Type Reactions    Codeine 07/29/2013    NAUSEA ONLY    Nausea      Your Current Medications        Dosage    DULoxetine 60 MG Oral Cap DR Particles Take 1 capsule (60 mg total) by mouth daily. carvedilol 12.5 MG Oral Tab Take 1 tablet (12.5 mg total) by mouth 2 (two) times daily with meals. Ondansetron HCl (ZOFRAN) 4 mg tablet Take 4 mg by mouth every 8 (eight) hours as needed for Nausea. Pantoprazole Sodium 40 MG Oral Tab EC Take 40 mg by mouth every morning before breakfast.    PEG 3350 17 g Oral Powd Pack Take 17 g by mouth daily. sennosides 8.6 MG Oral Tab Take 1 tablet by mouth daily. traMADol HCl 50 MG Oral Tab Take 50 mg by mouth every 6 (six) hours as needed for Pain. Vitamin B-12 250 MCG Oral Tab Take 1 tablet (250 mcg total) by mouth daily. Metoprolol Tartrate 50 MG Oral Tab Take 1 tablet (50 mg total) by mouth daily. lisinopril 20 MG Oral Tab Take 1.5 tablets (30 mg total) by mouth daily. LEVEMIR FLEXTOUCH 100 UNIT/ML Subcutaneous Solution Pen-injector INJECT 25 UNITS SC QD.    glimepiride 4 MG Oral Tab Take by mouth daily. gabapentin 600 MG Oral Tab Take by mouth 2 (two) times daily. furosemide 20 MG Oral Tab Take 1 tablet (20 mg total) by mouth daily. digoxin 0.125 MG Oral Tab Take 0.125 mg by mouth daily. atorvastatin 10 MG Oral Tab Take 1 tablet (10 mg total) by mouth daily. aspirin 81 MG Oral Tab EC Take 1 tablet (81 mg total) by mouth daily. amLODIPine Besylate 10 MG Oral Tab Take 0.25 tablets (2.5 mg total) by mouth daily.       Diagnoses for This Visit    Effusion of joint of right shoulder [956170]  -  Primary           We Ordered the Following     Normal Orders This Visit    AEROBIC BACTERIAL CULTURE [1268021 CUSTOM]     CELL COUNT SYNOVIAL [[id number] CUSTOM]     CELL CT/DIF SYNOVIAL [CYP2551 CUSTOM]     CRYSTALS SYNOVIAL FLUID [[id number] CUSTOM]     Future Labs/Procedures Expected by Expires    CELL COUNT SYNOVIAL [[id number] CUSTOM]  5/30/2023 5/30/2024    CRYSTALS SYNOVIAL FLUID [[id number] CUSTOM]  5/30/2023 5/30/2024                Did you know that [facility] primary care physicians now offer Video Visits through [address] for adult patients for a variety of conditions such as allergies, back pain and cold symptoms? Skip the drive and waiting room and online chat with a doctor face-to-face using your web-cam enabled computer or mobile device wherever you are. Video Visits cost $50 and can be paid hassle-free using a credit, debit, or health savings card. Not active on [employer]? Ask us how to get signed up today! If you receive a survey from [employer], please take a few minutes to complete it and provide feedback. We strive to deliver the best patient experience and are looking for ways to make improvements. Your feedback will help us do so. For more information on [employer], please visit [email or web address]           No text in SmartText           No text in SmartText

## (undated) NOTE — LETTER
[address], [facility]   Date:   11/2/2022     Name:   Genoveva Perez    YOB: 1935   MRN:   [id number]       [facility]? Dax the areas on your body where you feel the described sensations. Use the appropriate symbol. Bartholome Litten the areas of radiation. Include all affected areas. Just to complete the picture, please draw in the face. ACHE:  ^ ^ ^   NUMBNESS:  0000   PINS & NEEDLES:  = = = =                              ^ ^ ^                       0000              = = = =                                    ^ ^ ^                       0000            = = = =      BURNING:  XXXX   STABBING: ////                  XXXX                ////                         XXXX          ////     Please dax the line below indicating your degree of pain right now  with 0 being no pain 10 being the worst pain possible.                                          0             1             2              3             4              5              6              7             8             9             10         Patient Signature:

## (undated) NOTE — LETTER
[facility] [address] , [place]   Date:   3/22/2023     Name:   Janay Cartagena    YOB: 1935   MRN:   [id number]       [facility]? Dax the areas on your body where you feel the described sensations. Use the appropriate symbol. Tereasa Master the areas of radiation. Include all affected areas. Just to complete the picture, please draw in the face. ACHE:  ^ ^ ^   NUMBNESS:  0000   PINS & NEEDLES:  = = = =                              ^ ^ ^                       0000              = = = =                                    ^ ^ ^                       0000            = = = =      BURNING:  XXXX   STABBING: ////                  XXXX                ////                         XXXX          ////     Please dax the line below indicating your degree of pain right now  with 0 being no pain 10 being the worst pain possible.                                          0             1             2              3             4              5              6              7             8             9             10         Patient Signature:

## (undated) NOTE — LETTER
[address]   Date:   7/22/2020     Name:   Lissa Wolf    YOB: 1935   MRN:   [id number]       WHERE IS YOUR PAIN NOW? Juan C the areas on your body where you feel the described sensations.   Use the appropriate